# Patient Record
Sex: FEMALE | Race: WHITE | NOT HISPANIC OR LATINO | Employment: OTHER | URBAN - METROPOLITAN AREA
[De-identification: names, ages, dates, MRNs, and addresses within clinical notes are randomized per-mention and may not be internally consistent; named-entity substitution may affect disease eponyms.]

---

## 2018-12-17 ENCOUNTER — OFFICE VISIT - HEALTHEAST (OUTPATIENT)
Dept: FAMILY MEDICINE | Facility: CLINIC | Age: 28
End: 2018-12-17

## 2018-12-17 DIAGNOSIS — Z80.3 FAMILY HISTORY OF MALIGNANT NEOPLASM OF BREAST: ICD-10-CM

## 2018-12-17 DIAGNOSIS — B37.31 YEAST INFECTION OF THE VAGINA: ICD-10-CM

## 2018-12-17 DIAGNOSIS — Z12.4 SCREENING FOR CERVICAL CANCER: ICD-10-CM

## 2018-12-17 DIAGNOSIS — N89.8 VAGINAL DISCHARGE: ICD-10-CM

## 2018-12-17 DIAGNOSIS — Z01.419 WELL FEMALE EXAM WITH ROUTINE GYNECOLOGICAL EXAM: ICD-10-CM

## 2018-12-17 LAB
CLUE CELLS: ABNORMAL
TRICHOMONAS, WET PREP: ABNORMAL
YEAST, WET PREP: ABNORMAL

## 2018-12-17 ASSESSMENT — MIFFLIN-ST. JEOR: SCORE: 1238.65

## 2018-12-19 ENCOUNTER — COMMUNICATION - HEALTHEAST (OUTPATIENT)
Dept: FAMILY MEDICINE | Facility: CLINIC | Age: 28
End: 2018-12-19

## 2018-12-19 LAB
BKR LAB AP ABNORMAL BLEEDING: NO
BKR LAB AP BIRTH CONTROL/HORMONES: NORMAL
BKR LAB AP CERVICAL APPEARANCE: NORMAL
BKR LAB AP GYN ADEQUACY: NORMAL
BKR LAB AP GYN INTERPRETATION: NORMAL
BKR LAB AP HPV REFLEX: NORMAL
BKR LAB AP LMP: NORMAL
BKR LAB AP PATIENT STATUS: NORMAL
BKR LAB AP PREVIOUS ABNORMAL: NO
BKR LAB AP PREVIOUS NORMAL: 2014
HIGH RISK?: NO
PATH REPORT.COMMENTS IMP SPEC: NORMAL
RESULT FLAG (HE HISTORICAL CONVERSION): NORMAL

## 2018-12-20 ENCOUNTER — COMMUNICATION - HEALTHEAST (OUTPATIENT)
Dept: FAMILY MEDICINE | Facility: CLINIC | Age: 28
End: 2018-12-20

## 2018-12-20 DIAGNOSIS — Z80.3 FAMILY HISTORY OF BREAST CANCER IN FIRST DEGREE RELATIVE: ICD-10-CM

## 2018-12-20 DIAGNOSIS — Z80.3 FAMILY HISTORY OF MALIGNANT NEOPLASM OF BREAST: ICD-10-CM

## 2019-01-14 ENCOUNTER — TELEPHONE (OUTPATIENT)
Dept: ONCOLOGY | Facility: CLINIC | Age: 29
End: 2019-01-14

## 2019-01-14 NOTE — TELEPHONE ENCOUNTER
ONCOLOGY INTAKE: Records Information      APPT INFORMATION:  Referring provider:  Sary Rivas  Referring provider s clinic:  [facility]  Reason for visit/diagnosis:  Family Hx Breast Cancer    Were the records received with the referral (via [employer])? No    Has patient been seen for any external appt for this diagnosis (enter clinic/location)? Please confirm with PT upon Scheduling

## 2019-07-19 ENCOUNTER — OFFICE VISIT - HEALTHEAST (OUTPATIENT)
Dept: FAMILY MEDICINE | Facility: CLINIC | Age: 29
End: 2019-07-19

## 2019-07-19 DIAGNOSIS — Z31.69 PRE-CONCEPTION COUNSELING: ICD-10-CM

## 2019-07-19 DIAGNOSIS — N94.6 DYSMENORRHEA: ICD-10-CM

## 2019-07-19 ASSESSMENT — MIFFLIN-ST. JEOR: SCORE: 1258.38

## 2020-01-06 ENCOUNTER — OFFICE VISIT - HEALTHEAST (OUTPATIENT)
Dept: FAMILY MEDICINE | Facility: CLINIC | Age: 30
End: 2020-01-06

## 2020-01-06 DIAGNOSIS — N91.2 AMENORRHEA: ICD-10-CM

## 2020-01-06 DIAGNOSIS — N92.6 MISSED PERIOD: ICD-10-CM

## 2020-01-06 DIAGNOSIS — Z3A.01 LESS THAN 8 WEEKS GESTATION OF PREGNANCY: ICD-10-CM

## 2020-01-06 LAB — HCG UR QL: POSITIVE

## 2020-01-06 ASSESSMENT — MIFFLIN-ST. JEOR: SCORE: 1263.83

## 2020-01-13 ENCOUNTER — HOSPITAL ENCOUNTER (OUTPATIENT)
Dept: ULTRASOUND IMAGING | Facility: HOSPITAL | Age: 30
Discharge: HOME | End: 2020-01-13
Attending: NURSE PRACTITIONER

## 2020-02-10 ENCOUNTER — PRENATAL OFFICE VISIT - HEALTHEAST (OUTPATIENT)
Dept: MIDWIFE SERVICES | Facility: CLINIC | Age: 30
End: 2020-02-10

## 2020-02-10 DIAGNOSIS — Z34.01 ENCOUNTER FOR SUPERVISION OF NORMAL FIRST PREGNANCY IN FIRST TRIMESTER: ICD-10-CM

## 2020-02-10 LAB
BASOPHILS # BLD AUTO: 0 THOU/UL (ref 0–0.2)
BASOPHILS NFR BLD AUTO: 0 % (ref 0–2)
EOSINOPHIL # BLD AUTO: 0.3 THOU/UL (ref 0–0.4)
EOSINOPHIL NFR BLD AUTO: 3 % (ref 0–6)
ERYTHROCYTE [DISTWIDTH] IN BLOOD BY AUTOMATED COUNT: 11.9 % (ref 11–14.5)
HCT VFR BLD AUTO: 43.4 % (ref 35–47)
HGB BLD-MCNC: 14.4 G/DL (ref 12–16)
HIV 1+2 AB+HIV1 P24 AG SERPL QL IA: NEGATIVE
LYMPHOCYTES # BLD AUTO: 2.3 THOU/UL (ref 0.8–4.4)
LYMPHOCYTES NFR BLD AUTO: 24 % (ref 20–40)
MCH RBC QN AUTO: 31.2 PG (ref 27–34)
MCHC RBC AUTO-ENTMCNC: 33.2 G/DL (ref 32–36)
MCV RBC AUTO: 94 FL (ref 80–100)
MONOCYTES # BLD AUTO: 0.4 THOU/UL (ref 0–0.9)
MONOCYTES NFR BLD AUTO: 4 % (ref 2–10)
NEUTROPHILS # BLD AUTO: 6.5 THOU/UL (ref 2–7.7)
NEUTROPHILS NFR BLD AUTO: 68 % (ref 50–70)
PLATELET # BLD AUTO: 296 THOU/UL (ref 140–440)
PMV BLD AUTO: 10.7 FL (ref 8.5–12.5)
RBC # BLD AUTO: 4.61 MILL/UL (ref 3.8–5.4)
WBC: 9.5 THOU/UL (ref 4–11)

## 2020-02-10 ASSESSMENT — MIFFLIN-ST. JEOR: SCORE: 1274.94

## 2020-02-10 ASSESSMENT — EDINBURGH POSTNATAL DEPRESSION SCALE (EPDS): TOTAL SCORE: 0

## 2020-02-11 LAB
ABO/RH(D): NORMAL
ABORH REPEAT: NORMAL
ANTIBODY SCREEN: NEGATIVE
BACTERIA SPEC CULT: NO GROWTH
HBV SURFACE AG SERPL QL IA: NEGATIVE
RUBV IGG SERPL QL IA: POSITIVE
T PALLIDUM AB SER QL: NEGATIVE

## 2020-02-20 ENCOUNTER — COMMUNICATION - HEALTHEAST (OUTPATIENT)
Dept: MIDWIFE SERVICES | Facility: CLINIC | Age: 30
End: 2020-02-20

## 2020-02-24 ENCOUNTER — PRENATAL OFFICE VISIT - HEALTHEAST (OUTPATIENT)
Dept: MIDWIFE SERVICES | Facility: CLINIC | Age: 30
End: 2020-02-24

## 2020-02-24 DIAGNOSIS — Z34.01 ENCOUNTER FOR SUPERVISION OF NORMAL FIRST PREGNANCY IN FIRST TRIMESTER: ICD-10-CM

## 2020-02-24 ASSESSMENT — MIFFLIN-ST. JEOR: SCORE: 1271.53

## 2020-03-16 ENCOUNTER — PRENATAL OFFICE VISIT - HEALTHEAST (OUTPATIENT)
Dept: MIDWIFE SERVICES | Facility: CLINIC | Age: 30
End: 2020-03-16

## 2020-03-16 DIAGNOSIS — L30.9 ACUTE ECZEMA: ICD-10-CM

## 2020-03-16 DIAGNOSIS — Z34.01 ENCOUNTER FOR SUPERVISION OF NORMAL FIRST PREGNANCY IN FIRST TRIMESTER: ICD-10-CM

## 2020-03-16 ASSESSMENT — MIFFLIN-ST. JEOR: SCORE: 1295.35

## 2020-04-06 ENCOUNTER — HOSPITAL ENCOUNTER (OUTPATIENT)
Dept: ULTRASOUND IMAGING | Facility: HOSPITAL | Age: 30
Discharge: HOME | End: 2020-04-06
Attending: ADVANCED PRACTICE MIDWIFE

## 2020-04-06 ENCOUNTER — AMBULATORY - HEALTHEAST (OUTPATIENT)
Dept: OBGYN | Facility: CLINIC | Age: 30
End: 2020-04-06

## 2020-04-06 DIAGNOSIS — Z34.01 ENCOUNTER FOR SUPERVISION OF NORMAL FIRST PREGNANCY IN FIRST TRIMESTER: ICD-10-CM

## 2020-04-13 ENCOUNTER — PRENATAL OFFICE VISIT - HEALTHEAST (OUTPATIENT)
Dept: MIDWIFE SERVICES | Facility: CLINIC | Age: 30
End: 2020-04-13

## 2020-04-13 DIAGNOSIS — Z34.01 ENCOUNTER FOR SUPERVISION OF NORMAL FIRST PREGNANCY IN FIRST TRIMESTER: ICD-10-CM

## 2020-04-13 DIAGNOSIS — L30.9 ACUTE ECZEMA: ICD-10-CM

## 2020-05-11 ENCOUNTER — OFFICE VISIT - HEALTHEAST (OUTPATIENT)
Dept: MIDWIFE SERVICES | Facility: CLINIC | Age: 30
End: 2020-05-11

## 2020-05-11 DIAGNOSIS — Z34.01 ENCOUNTER FOR SUPERVISION OF NORMAL FIRST PREGNANCY IN FIRST TRIMESTER: ICD-10-CM

## 2020-05-11 DIAGNOSIS — Z34.02 ENCOUNTER FOR SUPERVISION OF NORMAL FIRST PREGNANCY, SECOND TRIMESTER: ICD-10-CM

## 2020-05-25 ENCOUNTER — COMMUNICATION - HEALTHEAST (OUTPATIENT)
Dept: MIDWIFE SERVICES | Facility: CLINIC | Age: 30
End: 2020-05-25

## 2020-06-01 ENCOUNTER — PRENATAL OFFICE VISIT - HEALTHEAST (OUTPATIENT)
Dept: MIDWIFE SERVICES | Facility: CLINIC | Age: 30
End: 2020-06-01

## 2020-06-01 DIAGNOSIS — Z34.01 ENCOUNTER FOR SUPERVISION OF NORMAL FIRST PREGNANCY IN FIRST TRIMESTER: ICD-10-CM

## 2020-06-01 LAB
FASTING STATUS PATIENT QL REPORTED: NO
GLUCOSE 1H P 50 G GLC PO SERPL-MCNC: 91 MG/DL (ref 70–139)
HGB BLD-MCNC: 11.5 G/DL (ref 12–16)

## 2020-06-01 ASSESSMENT — MIFFLIN-ST. JEOR: SCORE: 1354.77

## 2020-06-02 LAB — T PALLIDUM AB SER QL: NEGATIVE

## 2020-06-15 ENCOUNTER — OFFICE VISIT - HEALTHEAST (OUTPATIENT)
Dept: MIDWIFE SERVICES | Facility: CLINIC | Age: 30
End: 2020-06-15

## 2020-06-15 DIAGNOSIS — Z34.01 ENCOUNTER FOR SUPERVISION OF NORMAL FIRST PREGNANCY IN FIRST TRIMESTER: ICD-10-CM

## 2020-06-29 ENCOUNTER — PRENATAL OFFICE VISIT - HEALTHEAST (OUTPATIENT)
Dept: MIDWIFE SERVICES | Facility: CLINIC | Age: 30
End: 2020-06-29

## 2020-06-29 DIAGNOSIS — Z34.01 ENCOUNTER FOR SUPERVISION OF NORMAL FIRST PREGNANCY IN FIRST TRIMESTER: ICD-10-CM

## 2020-06-29 ASSESSMENT — MIFFLIN-ST. JEOR: SCORE: 1360.67

## 2020-07-13 ENCOUNTER — OFFICE VISIT - HEALTHEAST (OUTPATIENT)
Dept: MIDWIFE SERVICES | Facility: CLINIC | Age: 30
End: 2020-07-13

## 2020-07-13 DIAGNOSIS — Z34.03 ENCOUNTER FOR SUPERVISION OF NORMAL FIRST PREGNANCY IN THIRD TRIMESTER: ICD-10-CM

## 2020-07-13 DIAGNOSIS — L30.9 ACUTE ECZEMA: ICD-10-CM

## 2020-07-13 ASSESSMENT — EDINBURGH POSTNATAL DEPRESSION SCALE (EPDS): TOTAL SCORE: 2

## 2020-07-27 ENCOUNTER — PRENATAL OFFICE VISIT - HEALTHEAST (OUTPATIENT)
Dept: MIDWIFE SERVICES | Facility: CLINIC | Age: 30
End: 2020-07-27

## 2020-07-27 DIAGNOSIS — Z34.03 ENCOUNTER FOR SUPERVISION OF NORMAL FIRST PREGNANCY IN THIRD TRIMESTER: ICD-10-CM

## 2020-07-27 ASSESSMENT — MIFFLIN-ST. JEOR: SCORE: 1385.62

## 2020-08-03 ENCOUNTER — PRENATAL OFFICE VISIT - HEALTHEAST (OUTPATIENT)
Dept: MIDWIFE SERVICES | Facility: CLINIC | Age: 30
End: 2020-08-03

## 2020-08-03 DIAGNOSIS — Z34.03 ENCOUNTER FOR SUPERVISION OF NORMAL FIRST PREGNANCY IN THIRD TRIMESTER: ICD-10-CM

## 2020-08-03 LAB — HGB BLD-MCNC: 12.8 G/DL (ref 12–16)

## 2020-08-03 ASSESSMENT — MIFFLIN-ST. JEOR: SCORE: 1383.8

## 2020-08-04 LAB
ALLERGIC TO PENICILLIN: NO
GP B STREP DNA SPEC QL NAA+PROBE: POSITIVE

## 2020-08-10 ENCOUNTER — PRENATAL OFFICE VISIT - HEALTHEAST (OUTPATIENT)
Dept: MIDWIFE SERVICES | Facility: CLINIC | Age: 30
End: 2020-08-10

## 2020-08-10 DIAGNOSIS — Z34.03 ENCOUNTER FOR SUPERVISION OF NORMAL FIRST PREGNANCY IN THIRD TRIMESTER: ICD-10-CM

## 2020-08-10 DIAGNOSIS — B95.1 POSITIVE GBS TEST: ICD-10-CM

## 2020-08-10 ASSESSMENT — MIFFLIN-ST. JEOR: SCORE: 1384.71

## 2020-08-19 ENCOUNTER — PRENATAL OFFICE VISIT - HEALTHEAST (OUTPATIENT)
Dept: MIDWIFE SERVICES | Facility: CLINIC | Age: 30
End: 2020-08-19

## 2020-08-19 ENCOUNTER — COMMUNICATION - HEALTHEAST (OUTPATIENT)
Dept: MIDWIFE SERVICES | Facility: CLINIC | Age: 30
End: 2020-08-19

## 2020-08-19 DIAGNOSIS — I49.8 OTHER CARDIAC ARRHYTHMIA: ICD-10-CM

## 2020-08-19 DIAGNOSIS — Z34.03 ENCOUNTER FOR SUPERVISION OF NORMAL FIRST PREGNANCY IN THIRD TRIMESTER: ICD-10-CM

## 2020-08-19 DIAGNOSIS — B95.1 POSITIVE GBS TEST: ICD-10-CM

## 2020-08-19 ASSESSMENT — MIFFLIN-ST. JEOR: SCORE: 1396.5

## 2020-08-20 ENCOUNTER — HOSPITAL ENCOUNTER (OUTPATIENT)
Dept: CARDIOLOGY | Facility: CLINIC | Age: 30
Discharge: HOME | End: 2020-08-20
Attending: ADVANCED PRACTICE MIDWIFE

## 2020-08-20 DIAGNOSIS — I49.8 OTHER CARDIAC ARRHYTHMIA: ICD-10-CM

## 2020-08-20 LAB
ATRIAL RATE - MUSE: 69 BPM
DIASTOLIC BLOOD PRESSURE - MUSE: NORMAL
INTERPRETATION ECG - MUSE: NORMAL
P AXIS - MUSE: 53 DEGREES
PR INTERVAL - MUSE: 108 MS
QRS DURATION - MUSE: 72 MS
QT - MUSE: 370 MS
QTC - MUSE: 396 MS
R AXIS - MUSE: 87 DEGREES
SYSTOLIC BLOOD PRESSURE - MUSE: NORMAL
T AXIS - MUSE: 30 DEGREES
VENTRICULAR RATE- MUSE: 69 BPM

## 2020-08-21 ENCOUNTER — COMMUNICATION - HEALTHEAST (OUTPATIENT)
Dept: MIDWIFE SERVICES | Facility: CLINIC | Age: 30
End: 2020-08-21

## 2020-08-21 ENCOUNTER — COMMUNICATION - HEALTHEAST (OUTPATIENT)
Dept: CARDIOLOGY | Facility: CLINIC | Age: 30
End: 2020-08-21

## 2020-08-22 ENCOUNTER — HOSPITAL ENCOUNTER (OUTPATIENT)
Dept: OBGYN | Facility: HOSPITAL | Age: 30
Discharge: HOME | End: 2020-08-22
Attending: ADVANCED PRACTICE MIDWIFE | Admitting: ADVANCED PRACTICE MIDWIFE

## 2020-08-22 ENCOUNTER — COMMUNICATION - HEALTHEAST (OUTPATIENT)
Dept: OBGYN | Facility: CLINIC | Age: 30
End: 2020-08-22

## 2020-08-22 LAB
SARS-COV-2 PCR COMMENT: NORMAL
SARS-COV-2 RNA SPEC QL NAA+PROBE: NEGATIVE
SARS-COV-2 VIRUS SPECIMEN SOURCE: NORMAL

## 2020-08-22 ASSESSMENT — MIFFLIN-ST. JEOR: SCORE: 1396.5

## 2020-08-23 ENCOUNTER — COMMUNICATION - HEALTHEAST (OUTPATIENT)
Dept: SCHEDULING | Facility: CLINIC | Age: 30
End: 2020-08-23

## 2020-08-23 ENCOUNTER — ANESTHESIA - HEALTHEAST (OUTPATIENT)
Dept: OBGYN | Facility: HOSPITAL | Age: 30
End: 2020-08-23

## 2020-08-25 ENCOUNTER — HOME CARE/HOSPICE - HEALTHEAST (OUTPATIENT)
Dept: HOME HEALTH SERVICES | Facility: HOME HEALTH | Age: 30
End: 2020-08-25

## 2020-08-27 ENCOUNTER — HOME CARE/HOSPICE - HEALTHEAST (OUTPATIENT)
Dept: HOME HEALTH SERVICES | Facility: HOME HEALTH | Age: 30
End: 2020-08-27

## 2020-09-08 ENCOUNTER — OFFICE VISIT - HEALTHEAST (OUTPATIENT)
Dept: MIDWIFE SERVICES | Facility: CLINIC | Age: 30
End: 2020-09-08

## 2020-09-08 ASSESSMENT — EDINBURGH POSTNATAL DEPRESSION SCALE (EPDS): TOTAL SCORE: 1

## 2021-01-20 ENCOUNTER — IMMUNIZATION (OUTPATIENT)
Dept: NURSING | Facility: CLINIC | Age: 31
End: 2021-01-20
Payer: COMMERCIAL

## 2021-01-20 PROCEDURE — 0001A PR COVID VAC PFIZER DIL RECON 30 MCG/0.3 ML IM: CPT

## 2021-01-20 PROCEDURE — 91300 PR COVID VAC PFIZER DIL RECON 30 MCG/0.3 ML IM: CPT

## 2021-02-10 ENCOUNTER — IMMUNIZATION (OUTPATIENT)
Dept: NURSING | Facility: CLINIC | Age: 31
End: 2021-02-10
Attending: FAMILY MEDICINE
Payer: COMMERCIAL

## 2021-02-10 PROCEDURE — 0002A PR COVID VAC PFIZER DIL RECON 30 MCG/0.3 ML IM: CPT

## 2021-02-10 PROCEDURE — 91300 PR COVID VAC PFIZER DIL RECON 30 MCG/0.3 ML IM: CPT

## 2021-03-07 ENCOUNTER — HEALTH MAINTENANCE LETTER (OUTPATIENT)
Age: 31
End: 2021-03-07

## 2021-05-17 ENCOUNTER — OFFICE VISIT (OUTPATIENT)
Dept: FAMILY MEDICINE | Facility: CLINIC | Age: 31
End: 2021-05-17
Payer: COMMERCIAL

## 2021-05-17 VITALS
HEIGHT: 64 IN | RESPIRATION RATE: 16 BRPM | WEIGHT: 128 LBS | OXYGEN SATURATION: 97 % | DIASTOLIC BLOOD PRESSURE: 60 MMHG | TEMPERATURE: 97.7 F | BODY MASS INDEX: 21.85 KG/M2 | SYSTOLIC BLOOD PRESSURE: 128 MMHG | HEART RATE: 67 BPM

## 2021-05-17 DIAGNOSIS — Z12.4 SCREENING FOR MALIGNANT NEOPLASM OF CERVIX: ICD-10-CM

## 2021-05-17 DIAGNOSIS — Z00.00 ROUTINE GENERAL MEDICAL EXAMINATION AT A HEALTH CARE FACILITY: Primary | ICD-10-CM

## 2021-05-17 DIAGNOSIS — Z80.3 FAMILY HISTORY OF MALIGNANT NEOPLASM OF BREAST: ICD-10-CM

## 2021-05-17 DIAGNOSIS — H61.23 BILATERAL IMPACTED CERUMEN: ICD-10-CM

## 2021-05-17 DIAGNOSIS — F34.1 DYSTHYMIA: ICD-10-CM

## 2021-05-17 PROBLEM — I49.9 ARRHYTHMIA: Status: ACTIVE | Noted: 2020-08-19

## 2021-05-17 PROBLEM — L30.9 ACUTE ECZEMA: Status: ACTIVE | Noted: 2020-03-16

## 2021-05-17 PROBLEM — Z30.49 ENCOUNTER FOR SURVEILLANCE OF OTHER CONTRACEPTIVE: Status: ACTIVE | Noted: 2021-05-17

## 2021-05-17 PROCEDURE — 99385 PREV VISIT NEW AGE 18-39: CPT | Performed by: NURSE PRACTITIONER

## 2021-05-17 PROCEDURE — G0145 SCR C/V CYTO,THINLAYER,RESCR: HCPCS | Performed by: NURSE PRACTITIONER

## 2021-05-17 PROCEDURE — 87624 HPV HI-RISK TYP POOLED RSLT: CPT | Performed by: NURSE PRACTITIONER

## 2021-05-17 ASSESSMENT — ANXIETY QUESTIONNAIRES
2. NOT BEING ABLE TO STOP OR CONTROL WORRYING: NOT AT ALL
3. WORRYING TOO MUCH ABOUT DIFFERENT THINGS: NOT AT ALL
6. BECOMING EASILY ANNOYED OR IRRITABLE: NOT AT ALL
IF YOU CHECKED OFF ANY PROBLEMS ON THIS QUESTIONNAIRE, HOW DIFFICULT HAVE THESE PROBLEMS MADE IT FOR YOU TO DO YOUR WORK, TAKE CARE OF THINGS AT HOME, OR GET ALONG WITH OTHER PEOPLE: NOT DIFFICULT AT ALL
1. FEELING NERVOUS, ANXIOUS, OR ON EDGE: NOT AT ALL
7. FEELING AFRAID AS IF SOMETHING AWFUL MIGHT HAPPEN: NOT AT ALL
5. BEING SO RESTLESS THAT IT IS HARD TO SIT STILL: NOT AT ALL
GAD7 TOTAL SCORE: 1

## 2021-05-17 ASSESSMENT — PATIENT HEALTH QUESTIONNAIRE - PHQ9
SUM OF ALL RESPONSES TO PHQ QUESTIONS 1-9: 3
5. POOR APPETITE OR OVEREATING: SEVERAL DAYS

## 2021-05-17 ASSESSMENT — MIFFLIN-ST. JEOR: SCORE: 1280.6

## 2021-05-17 NOTE — PROGRESS NOTES
"   SUBJECTIVE:   CC: Shelly Salmeron is an 30 year old woman who presents for preventive health visit.       Patient has been advised of split billing requirements and indicates understanding: Yes  Healthy Habits:    Do you get at least three servings of calcium containing foods daily (dairy, green leafy vegetables, etc.)? no    Amount of exercise or daily activities, outside of work: 3 day(s) per week    Problems taking medications regularly No    Medication side effects: No    Have you had an eye exam in the past two years? no    Do you see a dentist twice per year? yes    Do you have sleep apnea, excessive snoring or daytime drowsiness?no      Abnormal Mood Symptoms  Onset: about 3 mo     Description:   Depression: YES  Anxiety: no    Accompanying Signs & Symptoms:  Still participating in activities that you used to enjoy: no doesn't feel like being around others  Fatigue: YES  Irritability: no  Difficulty concentrating: no  Changes in appetite: no  Problems with sleep: no  Heart racing/beating fast : no  Thoughts of hurting yourself or others: none    History:   Recent stress: YES- having a baby, moving  Prior depression hospitalization: None  Family history of depression: YES  Family history of anxiety: YES    Precipitating factors:   Alcohol/drug use: no    Alleviating factors:  none    Therapies Tried and outcome: None    Patient discussed feeling not wanting to participate in her normal activities or have social contact. Normally she states this feeling is intermittent, however, this time the feeling is lasting longer than normal. Symptoms still seem to come and go \"with more frequency\". She has family history of depression. She would like to try counseling sessions to see if this will improve any of her symptoms. She does not feel like she needs medications as this time.     Today's PHQ-2 Score:   PHQ-2 ( 1999 [employer]) 5/17/2021   Q1: Little interest or pleasure in doing things 1   Q2: Feeling down, " depressed or hopeless 1   PHQ-2 Score 2       Abuse: Current or Past(Physical, Sexual or Emotional)- No  Do you feel safe in your environment? Yes    Have you ever done Advance Care Planning? (For example, a Health Directive, POLST, or a discussion with a medical provider or your loved ones about your wishes): No, advance care planning information given to patient to review.  Advanced care planning was discussed at today's visit.    Social History     Tobacco Use     Smoking status: Never Smoker     Smokeless tobacco: Never Used   Substance Use Topics     Alcohol use: Yes     Comment: occ.     If you drink alcohol do you typically have >3 drinks per day or >7 drinks per week? No                     Reviewed orders with patient.  Reviewed health maintenance and updated orders accordingly - Yes    Patient has a strong family history of early breast cancer, mother in her early 40's  History of abnormal Pap smear: No     Reviewed and updated as needed this visit by clinical staff  Tobacco  Allergies  Meds  Problems  Med Hx  Surg Hx  Fam Hx  Soc Hx          Reviewed and updated as needed this visit by Provider  Tobacco  Allergies  Meds  Problems  Med Hx  Surg Hx  Fam Hx             ROS:  CONSTITUTIONAL: NEGATIVE for fever, chills, change in weight  INTEGUMENTARU/SKIN: NEGATIVE for worrisome rashes, moles or lesions  EYES: NEGATIVE for vision changes or irritation  ENT: NEGATIVE for ear, mouth and throat problems in general. Left ear feels plugged.   RESP: NEGATIVE for significant cough or SOB  BREAST: NEGATIVE for masses, tenderness or discharge  CV: NEGATIVE for chest pain, palpitations or peripheral edema  GI: NEGATIVE for nausea, abdominal pain, heartburn, or change in bowel habits  : NEGATIVE for unusual urinary or vaginal symptoms. Periods are regular.  MUSCULOSKELETAL: NEGATIVE for significant arthralgias or myalgia  NEURO: NEGATIVE for weakness, dizziness or paresthesias  PSYCHIATRIC: Positive for  "concerns of lower mood/ depression.     OBJECTIVE:   /60 (BP Location: Right arm, Patient Position: Chair, Cuff Size: Adult Regular)   Pulse 67   Temp 97.7  F (36.5  C)   Resp 16   Ht 1.626 m (5' 4\")   Wt 58.1 kg (128 lb)   LMP 05/04/2021 (Exact Date)   SpO2 97%   Breastfeeding Yes   BMI 21.97 kg/m       EXAM:  GENERAL: healthy, alert and no distress  EYES: Eyes grossly normal to inspection, PERRL and conjunctivae and sclerae normal  HENT: bilateral ear canals and TM's impacted with cerumen, nose and mouth without ulcers or lesions  NECK: no adenopathy, no asymmetry, masses, or scars and thyroid normal to palpation  RESP: lungs clear to auscultation - no rales, rhonchi or wheezes  BREAST: normal without masses, tenderness or nipple discharge and no palpable axillary masses or adenopathy  CV: regular rate and rhythm, normal S1 S2, no S3 or S4, no murmur, click or rub, no peripheral edema and peripheral pulses strong  ABDOMEN: soft, nontender, no hepatosplenomegaly, no masses and bowel sounds normal   (female): normal female external genitalia, normal urethral meatus, vaginal mucosa pink, moist, well rugated, and normal cervix/adnexa/uterus without masses or discharge  MS: no gross musculoskeletal defects noted, no edema  SKIN: no suspicious lesions or rashes  NEURO: Normal strength and tone, mentation intact and speech normal  PSYCH: mentation appears normal, affect normal/bright      ASSESSMENT/PLAN:     Routine general medical examination at a health care facility   Healthy female exam completed today. Discussed lifestyle modifications including weight loss/ management, eating a balanced diet, and getting regular cardiovascular exercise. Discussed self breast exams and how to complete these. Pap Smear updated today. Labs reviewed. Plan yearly annual physicals or sooner as needed.    Family history of malignant neoplasm of breast  Family history of early breast cancer, mother diagnosed in early " "40's. Will order screening mammogram.  -MA Screening Digital Bilateral    Screening for malignant neoplasm of cervix   Pap smear completed today, if negative will not need to repeat for 5 years   -Pap imaged thin layer screen with HPV -   recommended age 30 - 65 years, HPV High Risk Types DNA Cervical    Dysthymia  Patient will follow up with employer to determine if there is a program through work, if not will put in referral for therapy consultation. She does not need to follow up with this unless worsening.     Bilateral impacted cerumen  Bilateral ears cleaned with irrigation by Medical Assistant. Patient tolerated the procedure well and is without complaint.     Advised patient to avoid Q-Tips, topical emollients such asmineral oil may be beneficial in decreasing the incidence of cerumen impaction. A cotton ball dipped in Mineral oil and placed in the external ear canal for 10-20 min once a week, may help aid in normal elimination ofcerumen.         Patient has been advised of split billing requirements and indicates understanding: Yes  COUNSELING:   Reviewed preventive health counseling, as reflected in patient instructions       Regular exercise       Healthy diet/nutrition       Folic Acid       Osteoporosis prevention/bone health    Estimated body mass index is 21.97 kg/m  as calculated from the following:    Height as of this encounter: 1.626 m (5' 4\").    Weight as of this encounter: 58.1 kg (128 lb).        She reports that she has never smoked. She has never used smokeless tobacco.      Counseling Resources:  ATP IV Guidelines  Pooled Cohorts Equation Calculator  Breast Cancer Risk Calculator  BRCA-Related Cancer Risk Assessment: FHS-7 Tool  FRAX Risk Assessment  ICSI Preventive Guidelines  Dietary Guidelines for Americans, 2010  USDA's MyPlate  ASA Prophylaxis  Lung CA Screening    YULIET Witt [facility]  "

## 2021-05-18 ASSESSMENT — ANXIETY QUESTIONNAIRES: GAD7 TOTAL SCORE: 1

## 2021-05-19 LAB
COPATH REPORT: NORMAL
PAP: NORMAL

## 2021-05-21 LAB
FINAL DIAGNOSIS: NORMAL
HPV HR 12 DNA CVX QL NAA+PROBE: NEGATIVE
HPV16 DNA SPEC QL NAA+PROBE: NEGATIVE
HPV18 DNA SPEC QL NAA+PROBE: NEGATIVE
SPECIMEN DESCRIPTION: NORMAL
SPECIMEN SOURCE CVX/VAG CYTO: NORMAL

## 2021-05-27 VITALS
RESPIRATION RATE: 18 BRPM | TEMPERATURE: 98 F | SYSTOLIC BLOOD PRESSURE: 90 MMHG | HEART RATE: 80 BPM | DIASTOLIC BLOOD PRESSURE: 60 MMHG

## 2021-05-30 NOTE — PROGRESS NOTES
Assessment/ Plan:         1. Dysmenorrhea     2. Pre-conception counseling       History of dysmenorrhea reassured patient that the symptoms that she experiences in the beginning of her cycle is reportedly within normal limits.  However if she does have difficulties conceiving in the next 1 year this would certainly be something that could be worked up further.  At this time it does not sound as though she has endometriosis.    Preconception counseling discussed with the patient including starting a prenatal vitamin and vitamin D, obtaining and maintaining regular cardiovascular exercise and eating a balanced and healthy diet.  Also discussed different types of pregnancy care that is available when she gets to be pregnant.  Briefly discussed the care and pregnancy.  Advised patient to think about what type of care she would like to have during pregnancy and where she would like to deliver her baby.  I answered all questions the best my ability.  Patient advised to follow-up as needed.      Subjective:      Shelly Salmeron is a 28 y.o. female who presents for pre-conception counseling. She does mention some severe back, hip, and abdominal pain and bloating on the first day of her mensis. She reports that this will occur for the first couple days of her cycle but then will resolve. No family history of endometriosis or infertility. Her mensis is regular and average length is 29 days.  She denies any significant heavy bleeding or clot passage.  She currently uses condoms for contraceptive management.  She has not started any supplements such as a prenatal vitamin.    The following portions of the patient's history were reviewed and updated as appropriate: allergies, current medications and problem list.    There is no problem list on file for this patient.      No current outpatient medications on file.       Review of Systems   Pertinent items are noted in HPI.      Objective:      /70   Pulse 72   Resp 16   " Ht 5' 2.6\" (1.59 m)   Wt 128 lb (58.1 kg)   LMP 07/05/2019   BMI 22.96 kg/m      No physical exam today was completed given the nature of the visit.    General appearance: alert, appears stated age and cooperative      Sary Rivas, BRAN  5:13 PM  "

## 2021-06-02 VITALS — HEIGHT: 63 IN | BODY MASS INDEX: 21.97 KG/M2 | WEIGHT: 124 LBS

## 2021-06-03 VITALS — WEIGHT: 128 LBS | BODY MASS INDEX: 22.68 KG/M2 | HEIGHT: 63 IN

## 2021-06-04 ENCOUNTER — VIRTUAL VISIT (OUTPATIENT)
Dept: FAMILY MEDICINE | Facility: CLINIC | Age: 31
End: 2021-06-04
Payer: COMMERCIAL

## 2021-06-04 VITALS
BODY MASS INDEX: 26.74 KG/M2 | SYSTOLIC BLOOD PRESSURE: 98 MMHG | HEIGHT: 63 IN | WEIGHT: 150.9 LBS | HEART RATE: 88 BPM | DIASTOLIC BLOOD PRESSURE: 58 MMHG

## 2021-06-04 VITALS
BODY MASS INDEX: 26.51 KG/M2 | WEIGHT: 149.6 LBS | HEART RATE: 72 BPM | HEIGHT: 63 IN | DIASTOLIC BLOOD PRESSURE: 60 MMHG | SYSTOLIC BLOOD PRESSURE: 90 MMHG

## 2021-06-04 VITALS
WEIGHT: 129.2 LBS | RESPIRATION RATE: 12 BRPM | SYSTOLIC BLOOD PRESSURE: 96 MMHG | DIASTOLIC BLOOD PRESSURE: 62 MMHG | HEART RATE: 64 BPM | HEIGHT: 63 IN | BODY MASS INDEX: 22.89 KG/M2

## 2021-06-04 VITALS
SYSTOLIC BLOOD PRESSURE: 92 MMHG | HEART RATE: 63 BPM | WEIGHT: 142 LBS | OXYGEN SATURATION: 98 % | BODY MASS INDEX: 25.56 KG/M2 | DIASTOLIC BLOOD PRESSURE: 58 MMHG

## 2021-06-04 VITALS — BODY MASS INDEX: 28.14 KG/M2 | HEIGHT: 63 IN | WEIGHT: 158.8 LBS

## 2021-06-04 VITALS
HEART RATE: 80 BPM | HEIGHT: 63 IN | SYSTOLIC BLOOD PRESSURE: 88 MMHG | BODY MASS INDEX: 27.71 KG/M2 | DIASTOLIC BLOOD PRESSURE: 60 MMHG | WEIGHT: 156.4 LBS

## 2021-06-04 VITALS
HEIGHT: 62 IN | DIASTOLIC BLOOD PRESSURE: 50 MMHG | SYSTOLIC BLOOD PRESSURE: 96 MMHG | HEART RATE: 68 BPM | BODY MASS INDEX: 24.48 KG/M2 | WEIGHT: 133 LBS

## 2021-06-04 VITALS
HEIGHT: 63 IN | HEART RATE: 72 BPM | SYSTOLIC BLOOD PRESSURE: 108 MMHG | BODY MASS INDEX: 23.39 KG/M2 | WEIGHT: 132 LBS | DIASTOLIC BLOOD PRESSURE: 66 MMHG

## 2021-06-04 VITALS
HEART RATE: 76 BPM | DIASTOLIC BLOOD PRESSURE: 52 MMHG | BODY MASS INDEX: 24.19 KG/M2 | HEIGHT: 63 IN | WEIGHT: 136.5 LBS | SYSTOLIC BLOOD PRESSURE: 90 MMHG

## 2021-06-04 VITALS
SYSTOLIC BLOOD PRESSURE: 91 MMHG | HEIGHT: 63 IN | DIASTOLIC BLOOD PRESSURE: 44 MMHG | WEIGHT: 158.8 LBS | HEART RATE: 76 BPM | BODY MASS INDEX: 28.14 KG/M2

## 2021-06-04 VITALS
DIASTOLIC BLOOD PRESSURE: 66 MMHG | BODY MASS INDEX: 27.64 KG/M2 | HEIGHT: 63 IN | SYSTOLIC BLOOD PRESSURE: 104 MMHG | WEIGHT: 156 LBS | HEART RATE: 80 BPM

## 2021-06-04 VITALS
SYSTOLIC BLOOD PRESSURE: 86 MMHG | HEIGHT: 63 IN | HEART RATE: 80 BPM | BODY MASS INDEX: 27.68 KG/M2 | DIASTOLIC BLOOD PRESSURE: 60 MMHG | WEIGHT: 156.2 LBS

## 2021-06-04 VITALS — BODY MASS INDEX: 25.56 KG/M2 | WEIGHT: 142 LBS

## 2021-06-04 PROCEDURE — 99214 OFFICE O/P EST MOD 30 MIN: CPT | Mod: 95 | Performed by: NURSE PRACTITIONER

## 2021-06-04 PROCEDURE — 96127 BRIEF EMOTIONAL/BEHAV ASSMT: CPT | Mod: 95 | Performed by: NURSE PRACTITIONER

## 2021-06-04 ASSESSMENT — PATIENT HEALTH QUESTIONNAIRE - PHQ9: SUM OF ALL RESPONSES TO PHQ QUESTIONS 1-9: 11

## 2021-06-04 NOTE — PROGRESS NOTES
Shelly is a 30 year old who is being evaluated via a billable video visit.      How would you like to obtain your AVS? MyChart  If the video visit is dropped, the invitation should be resent by: [phone]  Will anyone else be joining your video visit? No      Video Start Time: 4:38 PM    Assessment & Plan     Post partum depression  Increasing postpartum depression.  Will start sertraline 50 mg.  Advised patient to cut tablet in half.  Take half tablet for the first 1 to 2 weeks then increase to full tab.  Would like her to check in in approximately 4 to 6 weeks.  - sertraline (ZOLOFT) 50 MG tablet; Take 1 tablet (50 mg) by mouth daily             Depression Screening Follow Up    PHQ 6/4/2021   PHQ-9 Total Score 11   Q9: Thoughts of better off dead/self-harm past 2 weeks More than half the days     Last PHQ-9 6/4/2021   1.  Little interest or pleasure in doing things 1   2.  Feeling down, depressed, or hopeless 2   3.  Trouble falling or staying asleep, or sleeping too much 1   4.  Feeling tired or having little energy 1   5.  Poor appetite or overeating 0   6.  Feeling bad about yourself 1   7.  Trouble concentrating 2   8.  Moving slowly or restless 1   Q9: Thoughts of better off dead/self-harm past 2 weeks 2   PHQ-9 Total Score 11   Difficulty at work, home, or with people -               Follow Up      Follow Up Actions Taken  Follow up if symptoms are worsening    Discussed the following ways the patient can remain in a safe environment:  Safe at home and contracts for saftey.       Return in about 6 weeks (around 7/16/2021) for ongoing symptoms if not improving.    YULIET Witt CNP  [facility]   Shelly is a 30 year old who presents for the following health issues     HPI     Abnormal Mood Symptoms  She feels like that her lows are lower than her normal baseline. Little things are getting to her more easily. Symptoms are in general affecting her family  and her wanting to be at work. She is interested in trying to start something to help with her symptoms of depression. She denies any chest pain, sob, or difficulty breathing.       PHQ 5/17/2021 6/4/2021   PHQ-9 Total Score 3 11   Q9: Thoughts of better off dead/self-harm past 2 weeks Not at all More than half the days     GERARDO-7 SCORE 5/17/2021   Total Score 1         Review of Systems   Constitutional, HEENT, cardiovascular, pulmonary, gi and gu systems are negative, except as otherwise noted.      Objective       Vitals:  No vitals were obtained today due to virtual visit.    Physical Exam   GENERAL: Healthy, alert and no distress  EYES: Eyes grossly normal to inspection.  No discharge or erythema, or obvious scleral/conjunctival abnormalities.  RESP: No audible wheeze, cough, or visible cyanosis.  No visible retractions or increased work of breathing.    SKIN: Visible skin clear. No significant rash, abnormal pigmentation or lesions.  NEURO: Cranial nerves grossly intact.  Mentation and speech appropriate for age.  PSYCH: Mentation appears normal, affect normal/bright, judgement and insight intact, normal speech and appearance well-groomed.          Video-Visit Details    Type of service:  Video Visit    Video End Time:4:53 PM    Originating Location (pt. Location): Home    Distant Location (provider location):  [facility]     Platform used for Video Visit: [employer]

## 2021-06-04 NOTE — PATIENT INSTRUCTIONS
Patient Education     Postpartum Depression: Treatment  Postpartum depression is a serious mood disorder that affects many women after having a baby. If you have this condition you may feel extremely sad, angry, anxious, and tired. You may cry a lot and have trouble focusing. Left untreated, these feelings can make it hard for you to take care of yourself and your baby. But help is available. Treatment includes medicine and talk therapy.   How is postpartum depression treated?  Treatment options for postpartum depression are generally the same, whether you are breastfeeding your baby or not. The 2 main treatments are:     Medicine. Antidepressants are the main type of medicine used for this condition. They work by balancing chemicals in the brain that control your moods. There are many different antidepressants. They work on different chemicals in the brain. In some cases, more than 1 medicine may be used. Tell your healthcare provider if you are breastfeeding. He or she can help you find the medicine that s best for you and your baby.    Talk therapy (counseling or psychotherapy). During a therapy session, you will talk about your feelings with a mental health provider (a therapist, counselor, psychologist, or ). He or she can provide emotional support, and help you learn ways to manage stress and anxiety. This may be done in private sessions with just you and the provider, or it may be done in a group therapy session.  Medicine and talk therapy may be used alone or together. Talk therapy may be used alone if antidepressants have not worked well for you in the past. Talk with your healthcare provider about this.   Most women find their condition gets better with these treatments. Other things you can do at home that may also help you feel better include:     Talking with friends and family members about how you are feeling    Joining a support group for moms with new babies    Doing some light  exercise, such as taking your baby for a walk in the stroller    Eating a healthy diet    Getting some rest whenever you can, such as sleeping when your baby sleeps    Asking for and accepting help with meals, shopping, and laundry  Medicines for breastfeeding moms  Most women with postpartum depression are treated with antidepressant medicines. These medicines must be prescribed by your healthcare provider. They are generally safe for both you and your baby. Tell your healthcare provider if you are breastfeeding. He or she can help you find a medicine that s a good choice for both you and your baby.   If you were treated with antidepressants during your pregnancy, your provider may want you to keep using the same medicine while breastfeeding. Changing to another medicine can have risks for you and your baby. Talk with your healthcare provider.   The types of antidepressants that may be used to treat postpartum depression in both breastfeeding and nonbreastfeeding moms include:     SSRIs (selective serotonin reuptake inhibitors). These types of medicines are used most often. They are considered safe for both mom and baby.    SNRIs (serotonin-norepinephrine reuptake inhibitors). These medicines also seem to be safe to use when breastfeeding. But some SNRIs may expose a baby to more medicine in breastmilk than others.    Atypical antidepressants. Some of these medicines are safe to take when breastfeeding.    Benzodiazepines. These medicines are often used for severe anxiety or agitation. But some are not advised for breastfeeding women.  Your healthcare provider will monitor you while you are on a medicine.   Antidepressants may take a few weeks to start working. But if you don t feel better in a few weeks, call your healthcare provider. Together you may decide to change the medicine, or to add another medicine.   Possible medicine risks for  babies  It's possible for antidepressant medicine to be passed to  babies in breastmilk. This amount is very low, and considered safe. Experts also note that the overall benefits of breastfeeding for both mom and baby outweigh any possible minor risk. It s also vital for a mom with postpartum depression to get the medicine that she needs.   But possible risks from medicine in your breastmilk may be a concern in some cases, such as if your baby:     Is sick    Is low birthweight    Was born early (premature)  If this is the case for you, talk with your healthcare provider and your baby s provider about what treatment options are safe for you and your baby.   Breastfeeding babies who are healthy don t often have side effects from antidepressants. But it is possible. Talk with your provider and your baby s provider about what side effects to watch for.   Treatment for nonbreastfeeding moms  Treatment options for postpartum depression are very similar for nonbreastfeeding women. Your healthcare provider will work with you to find a medicine that is safe for you. He or she will also likely advise that you try a combination of medicine and talk therapy.   Medicines that may be used if you are not breastfeeding include:    SSRIs (selective serotonin reuptake inhibitors)    SNRIs (serotonin-norepinephrine reuptake inhibitors)    Atypical antidepressants    Seratonin modulators  Medicine side effects for moms  Some antidepressants may have side effects. These are often short-term (temporary) and will go away on their own. These may include:     Upset stomach (nausea) or vomiting    Trouble sleeping    Weight gain or weight loss    Dry mouth    Dizziness    Lack of interest in sex    Diarrhea    Headaches  If any of these symptoms don t go away, or if they get in the way of your daily tasks, call your healthcare provider. He or she may have you try a different medicine.   When to call your healthcare provider  Call your healthcare provider right away if you are being treated for postpartum  depression and:     You have severe or abnormal side effects to medicine    Your side effects to medicine are getting in the way of your normal daily tasks  Call   Call  right away if you are being treated for postpartum depression and:     Your depression gets worse    You start thinking about hurting yourself, your baby, or others  Rhiannon last reviewed this educational content on 6/1/2020 2000-2021 [employer]. All rights reserved. This information is not intended as a substitute for professional medical care. Always follow your healthcare professional's instructions.

## 2021-06-04 NOTE — PROGRESS NOTES
Assessment/ Plan:         1. Amenorrhea  Pregnancy (Beta-hCG, Qual), Urine    US OB < 14 Weeks With Transvaginal   2. Missed period  Pregnancy (Beta-hCG, Qual), Urine    US OB < 14 Weeks With Transvaginal   3. Less than 8 weeks gestation of pregnancy  US OB < 14 Weeks With Transvaginal     Patient congratulated on her pregnancy.  Based on LMP she is 6 weeks 4 days pregnant with an expected date of delivery approximately 8/27/2020.  Encouraged her to continue her efforts at maintaining an active and healthy lifestyle.  I encouraged her to continue on the prenatal vitamin.  Advised patient to schedule her initial OB appointment between 10 and 12 weeks pregnant.  Placed an order for confirmation ultrasound.  Discussed with patient to obtain this between 7 and 8 weeks pregnant for dating purposes as well as viability.  Discussed and reviewed the early pregnancy folder.  This was provided to patient today.  Advised patient to notify clinic if she has any vaginal bleeding.  All questions were answered to the best my ability today.    She is in agreement with the above plan and will schedule her confirmation ultrasound in the next 1 to 2 weeks as well as schedule her IOB appointment between 10 and 12 weeks pregnant.    Subjective:      Shelly Salmeron is a 29 y.o. female who presents for pregnancy confirmation. LMP was 11/21/19.  This is a planned and welcomed pregnancy.  She is here today with her .  This is her first pregnancy.  She reports that her menstrual cycles are regular and typically 28 days.  She denies having any missed menstrual cycles or any significant early or late menses.  She is sure of her LMP.  She is taking a prenatal vitamin. She has been feeling very fatigued but otherwise overall well. She had norovirus last week and is recovering. Breast tenderness is present.  She is staying well hydrated. She is not exercising currently but does stay active.  She denies any vaginal bleeding or abnormal  "discharge.  No significant lower pelvic cramping or pain.  No significant past medical history.  She does mention that twins run in her family and she is curious to see if she is carrying twins.  At this time she reports that she is interested in following with the nurse midwives.    The following portions of the patient's history were reviewed and updated as appropriate: allergies, current medications and problem list.    There is no problem list on file for this patient.      No current outpatient medications on file.       Review of Systems   A 12 point comprehensive review of systems was negative except as noted.      Objective:      BP 96/62 (Patient Site: Right Arm, Patient Position: Sitting, Cuff Size: Adult Regular)   Pulse 64   Resp 12   Ht 5' 2.6\" (1.59 m)   Wt 129 lb 3.2 oz (58.6 kg)   LMP 11/21/2019 (Exact Date)   Breastfeeding No   BMI 23.18 kg/m      General appearance: alert, appears stated age and cooperative  Head: Normocephalic, without obvious abnormality, atraumatic   No further physical exam was completed today based on the nature of the visit.    Results for orders placed or performed in visit on 01/06/20   Pregnancy (Beta-hCG, Qual), Urine   Result Value Ref Range    Pregnancy Test, Urine Positive (!) Negative     25 minutes spent together with the patient today, more than 50% spent in counseling, discussing the above topics.        Sary Rivas CNP  9:27 AM  "

## 2021-06-05 NOTE — PROGRESS NOTES
PRENATAL VISIT   FIRST OBSTETRICAL EXAM - OB   Assessment / Impression   First prenatal visit at 11w4d        Plan:     -IOB labs drawn. Declined GC/CT.  -Pt is not interested in drawing lead level.   -Patient is not interested in waterbirth.   -Reviewed prenatal care schedule.   -Optimal nutrition and weight gain discussed. Pregnancy weight gain of 25-35 lbs (BMI 18.5-24.9) encouraged.   -Anticipatory guidance for common pregnancy questions and concerns reviewed.   -Danger s/sx for this trimester reviewed with patient.   -Reviewed genetic screening options with patient, patient does not elect for first trimester screening. The patient does not elect for quad screening.   -Reviewed carrier testing options with patient, patient does not elect for testing or referral to genetic counseling.  -IOB packet given and reviewed with patient.   -CNM services and hospital options reviewed; emergency and scheduling phone numbers given to patient.   -Return to clinic in 2 - 4 weeks  Total time spent with patient: 45 minutes, >50% time spent counseling and coordinating care.   Subjective:   Shelly Salmeron is a 29 y.o.  here today for her first obstetrical exam at 11w4d. Here with  Milind. This pregnancy is planned.  The patient reports nausea, but no vomiting, fatigue and some mild breast tenderness. She has a certain LMP. Patient's last menstrual period was 2019 (exact date)., predicting an expected date of delivery of Estimated Date of Delivery: 20. Last period was normal. Her pregnancy is dated by LMP confirmed by US.   The patient states that she is in a monogamous relationship. Offered GC/CT screening today and patient declined.   Current symptoms also include: breast tenderness and fatigue.   Risk factors: none.   Social History:   Education level: college   Occupation: Physical Therapist  Partners name: Milind  ?   OB History    Para Term  AB Living   1             SAB  "TAB Ectopic Multiple Live Births                  # Outcome Date GA Lbr John/2nd Weight Sex Delivery Anes PTL Lv   1 Current               History:   History reviewed. No pertinent past medical history.   Past Surgical History:   Procedure Laterality Date     WISDOM TOOTH EXTRACTION        Family History   Problem Relation Age of Onset     Breast cancer Mother 45     Hyperlipidemia Mother      Diabetes Father      Hypertension Maternal Uncle      Breast cancer Paternal Grandmother      Stroke Neg Hx       Social History     Tobacco Use     Smoking status: Never Smoker     Smokeless tobacco: Never Used   Substance Use Topics     Alcohol use: Not Currently     Alcohol/week: 2.0 - 3.0 standard drinks     Types: 2 - 3 Cans of beer per week     Drug use: No      Current Outpatient Medications   Medication Sig Dispense Refill     prenat.vits,srikanth,min-iron-folic (PRENATAL VITAMIN) Tab Take by mouth daily.       No current facility-administered medications for this visit.       No Known Allergies   The patient's medical, surgical and family histories were reviewed and were not pertinent to this visit.   Pap smear: Last Pap:12/17/18, Result: NILM. Next Due: 12/2021.       EPDS score today: 0   Review of Systems   General: Fatigue but otherwise denies problem   Eyes: Denies problem   Ears/Nose/Throat: Denies problem   Cardiovascular: Denies problem   Respiratory: Denies problem   Gastrointestinal: Nausea without vomiting, otherwise negative   Genitourinary: Denies any discharge, vaginal bleeding or itchiness or any other problem   Musculoskeletal: Breast tenderness otherwise denies problem   Skin: Denies problem   Neurologic: Denies problem   Psychiatric: Denies problem   Endocrine: Denies problem   Heme/Lymphatic: Denies problem   Allergic/Immunologic: Denies problem       Objective:   Objective    Vitals:    02/10/20 0919   BP: 108/66   Pulse: 72   Weight: 132 lb (59.9 kg)   Height: 5' 2.5\" (1.588 m)      Physical Exam: "   General Appearance: Alert, cooperative, no distress, appears stated age   HEENT: Normocephalic, without obvious abnormality, atraumatic. Conjunctiva/corneas clear  Neck: Supple, symmetrical, trachea midline, no adenopathy.   Thyroid: not enlarged, symmetric, no tenderness/mass/nodules   Back: Symmetric, no curvature, ROM normal,  Lungs: Clear to auscultation bilaterally, respirations unlabored   Heart: Regular rate and rhythm, S1 and S2 normal, no murmur, rub, or gallop. No edema to lower extremities.   Breasts: No breast masses, asymmetry, or nipple discharge. Nipples are  everted.   Abdomen: Gravid, soft, non-tender, bowel sounds active all four quadrants, no masses.   FHT: not audible by doptone  Pelvic: deferred per pt preference    Musculoskeletal: Extremities normal, atraumatic, no cyanosis   Skin: Skin color, texture, turgor normal, no rashes or lesions   Lymph nodes: Cervical, supraclavicular, and axillary nodes normal   Neurologic: Alert and oriented x 3. Normal speech   Lab:   Results for orders placed or performed in visit on 01/06/20   Pregnancy (Beta-hCG, Qual), Urine   Result Value Ref Range    Pregnancy Test, Urine Positive (!) Negative        Patient was seen with student who was present for learning.  I personally assessed, examined and made clinical decisions reflected in the documentation.     YULIET Guardado, CNM, IBCLC      JUJU Klein

## 2021-06-05 NOTE — PATIENT INSTRUCTIONS - HE
Welcome to [facility] and thank you for choosing us for your maternity care provider!  Congratulations!    [facility] - Contact information:  Appointment line and to get a hold of CNM in clinic Monday-Friday 8 am - 5 pm:  [phone].  There are some clinics with early start times (1st appointment 7:40 am) and others with evening hours (last appointment 6:20 pm).  Most are typically open from 8 am to 5 pm.    CNM on call answering service: [phone].  Specify your hospital of choice and leave a brief message for CNM;  will then page CNM who is on call at your specified hospital and you should receive a call back with 15 minutes.  Be sure that your ringer is audible and that you can accept blocked calls so that we can get back in touch with you! This number should be reserved for urgent needs if during the day, before 8 am, after 5 pm, weekends, holidays.      Pregnancy: Body Changes  From conception (fertilization) until after the birth of your child, you and your baby will change every day. To help you understand what is happening, we ve outlined how pregnancy begins and some of the changes you may notice.  How Pregnancy Begins  Conception is the union of a sperm and an egg. When it occurs, your baby s genetic makeup is complete, even its sex. Fertilization takes place in the fallopian tube. The fertilized egg then travels down this tube to the uterus (womb). The egg attaches to the lining of the uterus about a week later. There it grows and is nourished.    Your Changing Body  Pregnancy affects almost every part of your body. You may notice some of the following physical and emotional changes:    Your uterus expands outward and upward as your baby grows. You may feel pressure on your bladder, stomach, and other organs.    You may notice skin color changes on your forehead, nose, and cheeks. A dark line may form from your bellybutton down to your pubic area. The skin color around your  nipples and thighs may also change.    Pink stretch marks may appear on your abdomen, breasts, or hips.    Your hair may seem thicker. You lose less hair during pregnancy.    You may feel fine one day and weepy the next. This is caused by changes in your body, such as increased hormones (chemicals that affect the function of certain organs and also your moods).      Adapting to Pregnancy: First Trimester  As your body adjusts, you may have to change or limit your daily activities. You ll need more rest. You may also need to use the energy you have more wisely.  Eat stomach-friendly foods like cottage cheese, crackers, or bread throughout the day.    Your Changing Body  Almost every part of your body is affected as you adapt to pregnancy. The uterus and cervix will begin to soften right away. You may not look very pregnant during the first three months. But you are likely to have some common signs of early pregnancy:    Nausea    Fatigue    Frequent urination    Mood swings    Bloating of the abdomen    Missed or light periods (first trimester bleeding)    Nipple or breast tenderness, breast swelling      It s Not Too Late to Start Good Habits  What matters most is protecting your baby from this moment on. If you smoke, drink alcohol, or use drugs, now is the time to stop. If you need help, talk with your health care provider.    Smoking increases the risk of stillbirth or having a low-birth-weight baby. If you smoke, quit now.    Alcohol and drugs have been linked with miscarriage, birth defects, intellectual disability, and low birth weight. Do not drink alcohol or take drugs.    Tips to Relieve Nausea  Although nausea can occur at any time of the day, it may be worse in the morning. To help prevent nausea:    Eat small, light meals at frequent intervals.    Get up slowly. Eat a few unsalted crackers before you get out of bed.    Drink water with lemon slices.    Eat an ice pop in your favorite flavor.    Ask your  health care provider about taking alex or vitamin B6 for nausea and vomiting.    Talk with your health care provider if you take vitamins that upset your stomach.    Work Concerns  The end of the first trimester is a good time to discuss working during pregnancy with your employer. Follow your health care provider s advice if your job requires you to stand for a long time, work with hazardous tools, or even sit at a desk all day. Your workspace, workload, or scheduled hours may need to be adjusted. Perhaps you can change body postures more often or take an extra break.  Advice for Travel  Talk to your health care provider first, but the second trimester may be the best time for any travel. You may be advised to avoid certain trips while you re pregnant. Food and water can be concerns in developing countries. Travel by car is a good choice, as you can stop, get out, and stretch. Bring snacks and water along. Fasten the lap belt below your belly, low over your hips. Also be sure to wear the shoulder harness.  Intimacy  Unless your health care provider tells you to, there is no reason to stop having sex while you re pregnant. You or your partner may notice changes in desire. Desire may be less in the first trimester, due to nausea and fatigue. In the second trimester, sex may be very enjoyable. The third trimester can be a challenge comfort-wise. Try different positions and see what s best for you both.      Pregnancy: Your First Trimester Changes  The first trimester is a time of rapid development for your baby. Because your baby is growing so quickly, it is important that you start a healthy lifestyle right away. By the end of the first trimester, your baby has formed all of its major body organs and weighs just over an ounce.    Month 1 (Weeks 1-4)  The placenta (the organ that nourishes your baby) begins to form. The heart and lungs begin to develop. Your baby is about 1/4 inch long by the end of the first  month.    Month 2 (Weeks 5-8)  All of your baby s major body organs form. The face, fingers, toes, ears, and eyes appear. By the end of the month, your baby is about 1 inch long.    Month 3 (Weeks 9-12)  Your baby can open and close its fists and mouth. The sexual organs begin to form. As the first trimester ends, your baby is about 4 inches long.      Pregnancy: Your Weight  Being a healthy weight is important for both you and your baby. The weight you gain now is not just extra fat. It is also the weight of your baby. And it is the increased blood and fluids to support the baby. A slow, steady rate of gain is best. How much you should gain depends on your weight before getting pregnant. Check with your health care provider to find out what is right for you.    If You Gain Too Much  Gaining too much weight might cause you to feel tired or you could have a harder pregnancy or birth. If you and your health care provider decide you re gaining too much:    Eat fewer fats and sugars. Instead, eat fruit, vegetables, and whole-grain foods.    Drink plenty of water between meals.    Get at least 20 minutes of light exercise, such as walking, each day.    Don t diet. You might not get enough of the nutrients you or your baby needs.    Keep a diet diary to help you gauge what and how much you are eating .    If You re Not Gaining Enough  If you don t gain enough, your baby could be too small or have health problems. Women tend to gain most of their weight in the second and third trimesters. For now:    Eat many types of foods. Make sure you get enough calcium, protein, and carbohydrates.    Don t skip meals.    Eat healthy snacks.    Pick nutrient-dense, high calorie healthy food like trail mix or protein shakes.    See a dietitian for help.    Talk to your healthcare provider if you have had an eating disorder or problems with certain foods.      Pregnancy: Common Questions  There are plenty of myths and  old wives  tales   surrounding pregnancy. You may need help  fact from fiction. On this sheet, you ll find answers to a few common questions. If you have other questions, talk with a midwife.    Will Working Harm My Baby?  In most cases, working throughout your pregnancy is not harmful at all. There may be concerns if the job involves dangerous machinery or chemicals, lifting, or standing for very long periods of time. Talk to your health care provider and employer about your particular job and pregnancy.  Why Can t I Change the Cat Litter Box?  Cats carry a disease called toxoplasmosis. In adult humans, it shows up as a mild infection of the blood and organs. If you are infected during pregnancy, the baby s brain and eyes could be damaged. To be safe, have someone else change the litter. If you must handle it, wear a paper mask over your nose and mouth. Also, wear gloves and wash your hands afterward.  Which Medications Are Safe?  No prescription or over-the-counter drug is safe for everyone all of the time. But sometimes medications are needed. Be sure your health care provider knows you are pregnant. Then use only the medications he or she advises you to take. Please refer to the below resources for further information and discuss concern and questions with your midwife.  Is It True That I Can Overheat My Baby?  Yes. To avoid making your baby too warm:    Don t sit in a jacuzzi. A long, warm bath is fine, but not in water over 100 F.    Exercise less intensely if you feel fatigued. Base your workout on how you feel, not your heart rate. Heart rates aren t a good way to measure effort during pregnancy.  Can I Lift and Carry Safely?  Yes, if your health care provider doesn t tell you otherwise. Learn to lift and carry safely to avoid injury and reduce back pain during pregnancy. To protect your back:    Bend at the knees to bring the load nearer.    Get a good . Test the weight of the load.    Tighten your abdomen.  Exhale as you lift.    Lift with your legs, not with your back.    Carry the load close to your body.    Hold the load so you can see where you are going.  What If I Get Sick?  Most women get sick at least once during pregnancy. Talk with your health care provider if you do. Most likely it will not affect your pregnancy. Get plenty of rest and fluids, and eat what you can. Talk to your health care provider before taking any medications.        HEALTHY PREGNANCY CARE: 10-14 WEEKS PREGNANT     By weeks 10 to 14 of your pregnancy, the placenta has formed inside your uterus. It may be possible to hear your baby's heartbeat with a doppler ultrasound device. Your baby's eyes can and do move. The arms and legs can bend.    GENETIC SCREENING OPTIONS AT [facility]                All testing is optional. We don t recommend or discourage any test; it is totally up to you and your partner. Some couples wish to know their risk of having a baby with a genetic defect and others do not. We will support your decision. Abnormal results may lead to a discussion of options for further testing.    Accurate dating of your pregnancy is important for all testing so an ultrasound may be done prior to referral or testing.    No testing provides certainty; there are false positives and negatives associated with all testing, some more than others.    Most genetic testing is non-invasive (requires only a blood sample and sometimes an ultrasound or both).    It is always wise to check with your insurance carrier before proceeding.    Some testing can be done at our lab and some require a referral.    If you decide to do no testing, the 20 week ultrasound scan, which is a routine or standard ultrasound, has some ability to detect abnormalities in the baby, and identifies obstetric problems.    If you are over 35, you will have the option of a Level II ultrasound, which is a more detailed and targeting scan, that helps detect fetal anomalies as  well as obstetric problems.      If you have a more complex family history of chromosomal abnormalities, a referral to a genetic counselor and/or a Maternal Fetal Medicine specialist, to help identify available tests, may be recommended.    TYPES OF GENETIC TESTING AVAILABLE INCLUDE:    Carrier Screening/Testing for Genetic Conditions    There are many inherited conditions for which testing or carrier testing is available. Carrier screening can test for conditions like Cystic Fibrosis, Thalassemia, Rashel Sachs, Sickle Cell, Hemophilia, Muscular Dystrophy, Michael s disease and many others.     Cystic Fibrosis (CF) affects both males and females and people from all racial and ethnic groups. However, the disease is most common among Caucasians of Northern  descent. CF is also common among Latinos and American Indians. The disease is less common among  Americans and  Americans. More than 10 million Americans are carriers of a faulty CF gene and many of them don't know that they are CF carriers. One or both parents can be tested any time before or during pregnancy.    Talk to your care provider about your family history and whether you should be screened. A referral to a genetic counselor at [facility] can be made by your care provider at any time.    This is also tested for in the Isabel Metabolic Screen that your infant receives 24 hours after birth.     Fetal DNA cell Testing: [place] or Innatal (Non-Invasive Prenatal Testing)    At 10 wk or greater, a blood sample can be drawn here at clinic or at any of our referral offices. It will provide highly accurate results with low false positive rates for trisomy 18, trisomy 21 (Down Syndrome), and trisomy 13 (> 99% trisomy detection rate at a false positive rate of <0.1%). Gender identification can also be obtained if desired (98% accuracy).  Can also detect some sex-linked chromosomal abnormalities (80-90% accuracy).   This is often done if one of the other screening tests is abnormal.        1st Trimester Screening:     Everyone has an age related risk of having a baby with a genetic abnormality. This testing provides a risk profile which is better than assigning risk based solely on your age.    Refines your risk of having a baby with a chromosomal abnormality such as Trisomy 21 & 18.    This test with detect a fetus with one of these disorders about 85% of the time.    A thickened nuchal fold can also be associated with cardiac defects.    This test requires a blood collection combined with ultrasound to obtain a measurement of fluid at back of baby s neck (nuchal translucency).    False positive results occur approximately 5% of cases.    An additional blood sample may be necessary in order to calculate your risk of having a baby with a neural tube defect (e.g. spina bifida).  This is called the AFP test (alpha fetal protein).  An ultrasound should be able to  any spinal defect as well.    Follow-up of an abnormal test may include a more extensive ultrasound study and you may be offered an amniocentesis (a small sample of amniotic fluid is withdrawn and studied) for a definitive diagnosis    Quad Screen (4-marker screen)    Between 15 and 21 weeks, a sample of blood can be drawn at our lab to assess your risk of having a baby with Down Syndrome, Trisomy 18 and neural tube defects. Such testing is able to detect these conditions in 80% of cases and the false-positive rate is approximately 5%.     Follow-up of an abnormal test may include a more extensive ultrasound study and you may be offered an amniocentesis (a small sample of amniotic fluid is withdrawn and studied) for a definitive diagnosis      Referrals opportunities include:    [facility],  [facility]  o Offers nuchal translucency ultrasound; does not offer genetic counseling.    [place]  [facility]  [facility] ([facility]):   o Approximately an hour long visit includes 30 min with genetic counselor who discusses all testing available and which ones might be beneficial to you based on age, personal and family history.    o Blood will be drawn and the nuchal translucency ultrasound will be discussed and performed if desired. The Free Fetal DNA testing ([place]/[employer]) can be drawn also.  o Targeted or detailed Level II ultrasounds are also available with these perinatology groups.        Breastfeeding: a Healthy Option for You and Your Baby  Consider breastfeeding for the healthiest way to feed your baby. Ask your midwife or physician for more information.     The choice of how you will feed your baby is important.  Before your baby s birth, you ll want to learn about the benefits of breastfeeding.  [facility] have been designated Baby Friendly; an initiative that was created by the World Health Organization and UNICEF.  This helps give you and your baby the best start in feeding their baby.    Why should I breastfeed my baby?    Babies are less likely to develop childhood obesity or diabetes    Babies are less likely to suffer from recurrent ear infections    Babies are less likely to be hospitalized for respiratory conditions    Breast milk is rich in nutrients and antibodies-it is easy to digest    How does it benefit me?    Lowers the risk for diabetes, breast and ovarian cancer and postpartum depression    Moms can lose  baby weight  more quickly    Cost savings - formula can cost well over $1,500 per year    Convenient - no bottles and nipples to sterilize, no measuring and mixing formula    The physical contact with breastfeeding can make babies feel secure, warm and comforted     What about formula?  While you and your baby are staying with us at [facility], we will support whatever feeding choice you make for your baby.    Some important considerations:      The American Academy of  Pediatrics, the World Health Organization, and many more organizations recommend exclusive breastfeeding for 6 months and continued breastfeeding while adding other foods for the first 1-2 years.      Any amount of breastmilk has benefits to both baby and mother.    Giving formula in replacement of breastfeeding can affect mother s milk supply.  If formula is needed, hospital staff will work with you on a plan to help develop your milk supply.    Formula alters the natural growth of good bacteria in the  stomach.     Research has found that first time mothers who offer formula in the hospital have a shorter duration of breastfeeding.    How can I start to prepare?     Start by having a conversation with your medical provider.     Talk with your partner, family and friends.     Attend a prenatal class that includes breastfeeding preparation. Birth and breastfeeding classes are offered by [employer]. Visit [facility] for class information.     After your baby s birth, hospital staff and lactation consultants will help you and your baby get off to a great start with breastfeeding.    As your center of gravity and weight changes, use good body mechanics when changing positions and lifting. For example, use a straight back and your legs for support when lifting instead of bending over. Maintain good posture to prevent straining your muscles. Now is a good time to continue or restart your exercise program. Walking 30-60 minutes daily is an excellent way to keep fit. Yoga and swimming also offer many benefits.    The nausea and fatigue of early pregnancy have usually started to let up, so this is a good time to focus on nutrition. Consider attending a nutrition class. A healthy diet includes about 60 grams of protein each day (3-4 servings of dairy, 2-3 servings of meat/fish/poultry/nuts), 4-6 servings of whole grain foods, and 5-6 servings of fruits and vegetables. Remember to drink 6-8  glasses of water daily.    Watch for warning signs, such as     vaginal bleeding    fluid leaking from your vagina    severe abdominal pain    nausea and vomiting more than 4-5 times a day, or if you are unable to keep anything down    fever more than 100.4 degrees F.       RESOURCES   You can refer to the Starting Out Right book or find it online at [email or web address]/images/stories/[email or web address]-Starting-Out-[email or web address] or [email or web address]/images/stories/flipbooks/[facility]-starting-out-right/[facility]-starting-out-right.html#p=[email or web address]    You can sign up for a weekly parenting e-mail that gives support, tips and advice from health care professionals that starts with pregnancy and continues through the toddler years. To register, go to [email or web address] at any time during your pregnancy.    Breastfeeding:    OUTPATIENT LACTATION RESOURCES    [facility] and clinics: [email or web address]       -Schedule an appointment with a [facility] CNM who is also a Lactation Consultant by calling [phone]     -Schedule an appointment with a [facility] CNM who is also a Lactation Consultant by calling [phone]. We see women for breastfeeding visits at [facility] and [facility].     -Baby Café    Pregnant and interested in breastfeeding?  Need answers to breastfeeding questions?  Want to help breastfeeding moms?  Already breastfeeding and want to meet other moms?    Join us at the Baby Café!    Baby Cafe is a free, drop-in service offering breast-feeding support for pregnant women, breast-feeding mothers and their families.  Come share tips and socialize with other mothers.  Babies and siblings are welcome (no childcare available).    Starting April 2018, Baby Café will be at 4 locations.  Please see below for the Baby Café closest to you! Hmong, Estonian, and Australian which is may be available at some  sites.      [facility]  [address] [place] [address]  1st Wednesday: 10am-12pm    [facility]  [address] [place] [address]  3rd Wednesday 4-6pm    [facility]  [address] [place] [address]  4th Wednesday 10am-12:30pm    [facility]  [address] [place] [address]  4th Wednesdays: 4-6pm    -Attend a baby weigh in at [facility].  Lactation consultants are available to answer questions  [place]: Tuesdays 1:00 - 2:00  [place], [facility]: Mondays 1:00 - 2:00   [email or web address]    -Attend one of the New Mama groups at [facility] in [facility].  [facility] also offers one-on-one in home and in office lactation consults.   [email or web address]    -Attend a Kitty League meeting.  Multiple groups in several locations throughout the [facility]. The meetings are no-cost and always informative breastfeeding education session through [employer] La Leche League  [email or web address]    Childbirth and Parenting Education:   [facility]: http://[facility] HospitalSilverBack [email or web address]/   (341) 123-BABY  [employer]: (education, yoga & wellness) www.[employer]  [facility]: www.BringMeThatmaTechFaith   Childbirth collective: (Parent topic nights)  www.childbirthcollective.org/  Hypnobabies:  www.hypnobabiestwincities.com/  Hypnobirthing:  Http://hypnobirthing.com/    Book Recommendations:   Jasmine Philadelphia's Birthing From Within--first few chapters include a new-age tone, you may prefer to skip it and keep going, because there is good stuff later.  This book recommendation covers emotional preparation, but does cover coping with pain, and use of both pharmacological and nonpharmacological methods.    Dr. Hickman' The Pregnancy Book and The Birth Book--the pregnancy book goes month-by month    Womanly Art of Breastfeeding by La Leche League International   Bestfeeding by Nisha Song--great pictures    Mothering Your Nursing  "Toddler, by Holly Arias.   Addresses dealing with so many of the challenging behaviors of a nursing toddler.  How Weaning Happens, by La Leche League.  Discusses weaning at all ages, from medically necessary weaning of an infant, all the way up to age 5 (or older), with why/why not, and strategies.  Very empowering book both for deciding to wean and deciding not to.    American College of Nurse-Midwives (ACNM) http://www.midwife.org/; look at the informational handouts at http://www.midwife.org/Share-With-Women     www.mymidwife.org    Mother to Baby (Medication and Herbal guidance in pregnancy): http://www.mothertobaby.org  Toll-Free Hotline: 221.815.5740  LactMed (Medication use while breastfeeding): http://toxnet.nlm.nih.gov/newtoxnet/lactmed.htm    Women's Health.gov:  http://www.womenshealth.gov/a-z-topics/index.html    American pregnancy association - http://americanpregnancy.org    Centering Pregnancy (group prenatal care option): http://centeringhealthcare.org    Information about doulas:  Childbirth collective: http://www.childbirthcollective.org/  Doulas of [place] (HAI):  www.hai.org  [facility]  project: http://twincitiesdoulaproject.com/     Early Childhood and Family Education (ECFE):  ECFE offers parents hands-on learning experiences that will nourish a lifetime of teachable moments.  http://ecfe.info/ecfe-home/    March of Dimes www.marchofdimes.com     FDA - Nutrition  www.mypyramid.gov  Under \"For Consumers,\" click on \"pregnant and breastfeeding women.\"      Centers for Disease Control and Prevention (CDC) - Vaccines : http://www.cdc.gov/vaccines/       When researching information on the web, question the validity of websites.  The domains .gov, .edu and.org tend to be more reliable information.  If there are a lot of advertisements, be cautious of the information provided. Stay away from blogs and chat rooms please!      Nutrition & supplements:     Prenatal vitamin (those " with 600-1000 mcg folic acid and 27 mg of iron are enough).  Take with food or Juice     4-5 servings of dairy or other calcium rich foods (fish, leafy greens, soy) per day - if not, take 500-1000 mg additional calcium (Tums, pills, chews). Take with dairy     Vitamin D3 6881-3519 IU geltab daily.  Take with fattiest meal.  Look for fortified foods also (Dairy, Juice)     2-3 (4) oz servings of fish, seafood, nuts (walnuts & almonds), oils, avocado per week - if not, take Omega 3 Fatty acids: DHA & CYRIL 2417-4980 mg per day.  Other names: cod liver oil, fish oil. Take with fattiest meal.  Some prenatals have DHA, but typically not a sufficient dose.    Fish: Do not eat shark, swordfish, petros mackerel, or tilefish when you are pregnant or breastfeeding.  They contain high levels of mercury.  Limit white (albacore) tuna to no more than 6 ounces per week. Http://www.fda.gov/downloads/ForConsumers/ConsumerUpdates/WEP342147.pdf         Touring the [facility]  To schedule a tour at either [place] or [facility], please do so online using the following links:  [facility] - [email or web address]&[email or web address]&[email or web address]&jnz=202  [facility] - [email or web address]&[email or web address]&[email or web address]&[email or web address]&[email or web address]&hxp=149     You are invited to  Meet the [facility]  A way to tour the hospital Labor and Delivery unit and meet the midwives that attend births since you may not have the opportunity to meet them during your prenatal care.  Some sessions are informal meet and greet type social hours, others address a specific concern or topic.    Tuesday, Februrary 12, 2019 7-8pm  [facility]    Wednesday, April 17, 2019 7-8pm  [facility], Auditorium A    Thursday, August 15, 2019 7-8pm  [facility], [facility]    Wednesday November 13, 2019 7-8  pm  [facility], Auditorum A    Please call [phone] to register

## 2021-06-06 NOTE — PATIENT INSTRUCTIONS - HE
Visit actually [facility] Nurse Midwives - Contact information:  Appointment line and to get a hold of CNM in clinic Monday-Friday 8 am - 5 pm:  [phone].  There are some clinics with early start times (1st appointment 7:40 am) and others with evening hours (last appointment 6:20 pm).  Most are typically open from 8 am to 5 pm.    CNM on call answering service: [phone].  Specify your hospital of choice and leave a brief message for CNM;  will then page CNM who is on call at your specified hospital and you should receive a call back with 15 minutes.  Be sure that your ringer is audible and that you can accept blocked calls so that we can get back in touch with you! This number should be reserved for urgent needs if during the day, before 8 am, after 5 pm, weekends, holidays.    Contact the on-call CNM with warning signs, such as:    vaginal bleeding     Vaginal discharge and itching or pain and burning during urination    Leg/calf pain or swelling on one side    severe abdominal pain    nausea and vomiting more than 4-5 times a day, or if you are unable to keep anything down    fever more than 100.4 degrees F.         Touring the [facility]  To schedule a tour at either [place] or [facility], please do so online using the following links:  [facility] - [email or web address]&[email or web address]&[email or web address]&[email or web address]&[email or web address]  [facility] - [email or web address]://[email or web address]&[email or web address]&[email or web address]&[email or web address]&[email or web address]&hqd=687         You are invited to  Meet the [facility] Nurse-Midwives  A way to tour the hospital Labor and Delivery unit and meet the midwives that attend births since you may not have the opportunity to meet them during your prenatal care.  Some sessions are informal meet and greet type social hours, others address a specific concern or topic.    Tuesday, Februrary 12, 2019 7-8pm  [facility]  [facility]    Wednesday, April 17, 2019 7-8pm  [facility], Auditorium A    Thursday, August 15, 2019 7-8pm  [facility]    Wednesday November 13, 2019 7-8 pm  [facility], Auditorum A    Please call [phone] to register      Ultrasound Appointment:   Don't forget to schedule your ultrasound appointment around 20 weeks into your pregnancy. Your midwife will order the exam for you to schedule at [phone] with [facility] radiology locations or at the independent radiology clinic of your preference.      GENETIC SCREENING OPTIONS AT [facility]          All testing is optional. We don t recommend or discourage any test; it is totally up to you and your partner. Some couples wish to know their risk of having a baby with a genetic defect and others do not. We will support your decision. Abnormal results may lead to a discussion of options for further testing.    Accurate dating of your pregnancy is important for all testing so an ultrasound may be done prior to referral or testing.    No testing provides certainty; there are false positives and negatives associated with all testing, some more than others.    Most genetic testing is non-invasive (requires only a blood sample and sometimes an ultrasound or both).    It is always wise to check with your insurance carrier before proceeding.    Some testing can be done at our lab and some require a referral.    If you decide to do no testing, the 20 week ultrasound scan has some ability to detect abnormalities in the baby.    [place] or [facility]    At 10 wk or greater, a blood sample can be drawn here at clinic or at any of our referral offices. It will provide highly accurate results with low false positive rates for trisomy 18, trisomy 21 (Down Syndrome), and trisomy 13 (> 99% trisomy detection rate at a false positive rate of <0.1%). Gender identification can also be obtained if desired.    Quad Screen  (4-marker screen)    Between 15 and 21 weeks, a sample of blood can be drawn at our lab to assess your risk of having a baby with Down Syndrome, Trisomy 18 and neural tube defects. Such testing is able to detect these conditions in 80-90% of cases and the false-positive rate is approximately 5%.     Why is dental care in pregnancy important?  During pregnancy, you are more likely to have problems with your teeth or gums. If you have an infection in your teeth or gums, the chance of your baby being premature (born early) or having low birth weight may be slightly higher than if your teeth and gums are healthy  Dental care is safe during pregnancy and important for the health of you and your baby.   What should you know before you see the dentist?    Make sure your dentist knows that you are pregnant.    If medications for infection or for pain are needed, your dentist can prescribe ones that are safe for you and your baby.    Tell your dentist about any changes you have noticed since you became pregnant and about any medications r or supplements you are taking.    Routine x-rays should be avoided in pregnancy, but it may be necessary if there is a problem or an emergency.     Your body should be covered with a lead apron to protect you and your baby.    Dental work can be done safely at any point in pregnancy. If possible, it is best to delay treatments and pro- cedures until after the first trimester.    For more information on dental health in pregnancy: http://onlinelibrary.alarcon.com/store/10.1111/jmwh.17421/asset/rpzk22473.pdf?v=1&t=qeup1e81&p=[email or web address]     Quickening:   Your Baby in the Second Trimester of Pregnancy  ; At the start of the second trimester, you will feel your baby's movements, which get stronger as the baby grows bigger. At the end of the fourth month, your baby weighs about five ounces. Her kidneys begin to produce urine. During visits to your health care provider,  you will be able to hear your baby's heartbeat more clearly. Your baby can move and hear your voice.   ; By the end of the fifth month, you'll be able to feel light movements (called quickening) of your fetus. Your baby is sleeping and waking at regular intervals, and is more active than before. At this point, she is about nine inches long and weighs about one-half to one pound. During the sixth month, your baby's features become clearer. Eyebrows, eyelashes, and hair are developing. She also has finger and toe prints, and may be kicking strongly.  ; By the end of the second trimester, your baby weighs as much as two pounds and is about 11 inches long.         Gestational diabetes  Gestational diabetes develops during pregnancy (gestation). Like other types of diabetes, gestational diabetes affects how your cells use sugar (glucose). Gestational diabetes causes high blood sugar that can affect your pregnancy and your baby's health.  Any pregnancy complication is concerning, but there's good news. Expectant moms can help control gestational diabetes by eating healthy foods, exercising and, if necessary, taking medication. Controlling blood sugar can prevent a difficult birth and keep you and your baby healthy.  In gestational diabetes, blood sugar usually returns to normal soon after delivery. But if you've had gestational diabetes, you're at risk for type 2 diabetes. You'll continue working with your health care team to monitor and manage your blood sugar.    Who is at risk?  This is a list of factors that increase the risk of developing gestational diabetes for women during pregnancy:        Overweight prior to pregnancy (20% or more over ideal body weight)        High risk ethnic group: , , ,         Impaired glucose tolerance or traces of glucose in the urine        Family history of diabetes        Previously giving birth to a baby over 9 lbs. or stillborn         Previous pregnancy with gestational diabetes    Prevention:  There are no guarantees when it comes to preventing gestational diabetes -- but the more healthy habits you can adopt before pregnancy, the better. If you've had gestational diabetes, these healthy choices may also reduce your risk of having it in future pregnancies or developing type 2 diabetes down the road.    Eat healthy foods. Choose foods high in fiber and low in fat and calories. Focus on fruits, vegetables and whole grains. Strive for variety to help you achieve your goals without compromising taste or nutrition. Watch portion sizes.     Keep active. Exercising before and during pregnancy can help protect you from developing gestational diabetes. Aim for 30 minutes of moderate activity on most days of the week. Take a brisk daily walk. Ride your bike. Swim laps.  If you can't fit a single 30-minute workout into your day, several shorter sessions can do just as much good. Park in the distant lot when you run errands. Get off the bus one stop before you reach your destination. Every step you take increases your chances of staying healthy.    Lose excess pounds before pregnancy. Doctors don't recommend weight loss during pregnancy. But if you're planning to get pregnant, losing extra weight beforehand may help you have a healthier pregnancy.  Focus on permanent changes to your eating habits. Motivate yourself by remembering the long-term benefits of losing weight, such as a healthier heart, more energy and improved self-esteem.    Preventing Diabetes after Pregnancy:  It is estimated that 35-60 percent of women that have had gestational diabetes will develop type 2 diabetes in the future. It is also thought that children from these pregnancies have a greater chance of developing obesity and type 2 diabetes.    If you do have prediabetes or have risk factors for having diabetes, research shows that doing just two things can help you prevent or delay  type 2 diabetes: Lose 5% to 7% of your body weight, which would be 10 to 14 pounds for a 200-pound person; and get at least 150 minutes each week of physical activity, such as brisk walking.    RESOURCES   You can refer to the Starting Out Right book or find it online at http://www.[facility]althe[facility].org/images/stories/maternity/[facility]-Starting-Out-Right.pdf or http://www.healtheast.org/images/stories/flipbooks/[facility]-starting-out-right/[facility]-starting-out-right.html#p=8    You can sign up for a weekly parenting e-mail that gives support, tips and advice from health care professionals that starts with pregnancy and continues through the toddler years. To register, go to [email or web address] at any time during your pregnancy.    Breastfeeding Information:  OUTPATIENT LACTATION RESOURCES    -Schedule a clinic appointment with a [facility] CNM with dedicated clinic hours for breastfeeding assistance by calling [phone]. Breastfeeding clinic visits are at [facility] on Wednesdays, [facility] on Tuesdays and [facility] on Thursdays.     -Baby Café    Pregnant and interested in breastfeeding?  Need answers to breastfeeding questions?  Want to help breastfeeding moms?  Already breastfeeding and want to meet other moms?    Join us at the Baby Café!    Baby Cafe is a free, drop-in service offering breast-feeding support for pregnant women, breast-feeding mothers and their families.  Come share tips and socialize with other mothers.  Babies and siblings are welcome (no childcare available).    Starting April 2018, Baby Café will be at 4 locations.  Please see below for the Baby Café closest to you!      [facility]  [address] [place] [address]  1st Wednesday: 10am-12pm    [facility]  [address] [place] [address]  3rd Wednesday 4-6pm    [address] [address]  4th Wednesday 10am-12:30pm    [facility]  [address]  [place] [address]  4th Wednesdays: 4-6pm      Hmong, Nepali, and Czech which is may be available at some sites.    For more information, please contact: [email or web address] or [phone]    -Attend a baby weigh in at [facility].  Lactation consultants are available to answer questions  [place]: Tuesdays 1:00 - 2:00  [facility]: Mondays 1:00 - 2:00   www.[employer]    -Attend one of the New Mama groups at [facility] in [facility].  [facility] also offers one-on-one in home and in office lactation consults.   www.[employer]    -Attend a [facility] meeting.  Multiple groups in several locations throughout the [facility]. The meetings are no-cost and always informative breastfeeding education session through [facility] La Leche League  Www.Habbitsndas.org/  Medication use while breastfeeding: http://toxnet.nlm.nih.gov/newtoxnet/lactmed.htm     Childbirth and Parenting Education:   [facility]: http://Sierra Vista HospitalInfogami/   [phone]  [place]: (education, yoga & wellness) www.Wurl  [facility]: www.Knimbus   Childbirth collective: (Parent topic nights)  www.childbirthcollective.org/  Hypnobabies:  www.hypnobabiestwincities.com/  Hypnobirthing:  Http://hypnobirthing.com/    Book Recommendations:   Jasmine Jordy's Birthing From Within--first few chapters include a new-age tone, you may prefer to skip it and keep going, because there is good stuff later.  This book recommendation covers emotional preparation, but does cover coping with pain, and use of both pharmacological and nonpharmacological methods.    Dr. Hickman' The Pregnancy Book and The Birth Book--the pregnancy book goes month-by month    Womanly Art of Breastfeeding by [employer]   Bestfeeding by Nisha Song--great pictures    Mothering Your Nursing Toddler, by Holly Arias.   Addresses dealing with  "so many of the challenging behaviors of a nursing toddler.  How Weaning Happens, by La Leche League.  Discusses weaning at all ages, from medically necessary weaning of an infant, all the way up to age 5 (or older), with why/why not, and strategies.  Very empowering book both for deciding to wean and deciding not to.    American College of Nurse-Midwives (ACNM) http://www.midwife.org/; look at the informational handouts at http://www.midwife.org/Share-With-Women     www.mymidwife.org    Mother to Baby (Medication and Herbal guidance in pregnancy): http://www.mothertobaby.org  Toll-Free Hotline: 827.449.6428  LactMed (Medication use while breastfeeding): http://toxnet.nlm.nih.gov/newtoxnet/lactmed.htm    Women's Health.gov:  http://www.womenshealth.gov/a-z-topics/index.html    American pregnancy association - http://americanpregnancy.org    Centering Pregnancy (group prenatal care option): http://centeringhealthcare.org    Information about doulas:  Childbirth collective: http://www.childbirthcollective.org/  Doulas of [place] (MARINA):  www.marina.org  [facility]  project: http://twincitiesdoulaproject.com/     Early Childhood and Family Education (ECFE):  ECFE offers parents hands-on learning experiences that will nourish a lifetime of teachable moments.  http://ecfe.info/ecfe-home/    March of Dimes www.[employer].com     FDA - Nutrition  www.mypyramid.gov  Under \"For Consumers,\" click on \"pregnant and breastfeeding women.\"      Centers for Disease Control and Prevention (CDC) - Vaccines : http://www.cdc.gov/vaccines/       When researching information on the web, question the validity of websites.  The domains .gov, .edu and.org tend to be more reliable information.  If there are a lot of advertisements, be cautious of the information provided. Stay away from blogs and chat rooms please!    "

## 2021-06-06 NOTE — PROGRESS NOTES
Shelly presents with Latter day at 16/4 weeks  Feeling well.   She has been careful to limit her exposure to others. Works as PT, she has been still going to works. They have good hand hygiene, and are trying to distance themselves from others  Latter day works for the city of [employer], mainly works in an office, trying to socially distance himself as well. He may be able to work from home some.   Shelly and Latter day continue to decline genetic screening  Shelly and Latter day would like to do their 20 week FAS at [facility]. Encouraged Shelly to schedule her visit with the midwives after her US  Exercise: not exercising much, will start to go on walks now that the weather is getting nicer  Nutrition: eating small frequent meals  Shelly has ezcema, Shelly has noticed this is worsening the last few weeks. She typically uses a cream for her eczema but doesn't remember the name of it. Advised that she try 2.5% hydrocortisone for eczema on her hands and belly and under breasts. Also suggested she apply Aquaphor Eucerin ointment after washing and especially at night with socks over her hands to seal in the moisture. If her eczema persists/worsens will prescribe stronger steroid cream, encouraged Shelly to look at her records to see if she can determine what cream has worked for her in the past.

## 2021-06-06 NOTE — PROGRESS NOTES
Shelly and Buddhist are here for a routine prenatal visit and to check FHT. Pleased to hear FHT!  She has been feeling well over the last 2 weeks and is pleased to have more energy.  Having some difficulty falling asleep and staying asleep but in general this only happens 1-2 times per week and she is feeling generally well rested.  Doing some stretching and pelvic floor/core exercises.  No cardio, discussed daily walking.  Nausea is improving, dry heaving only rarely.  Some round ligament pain, comfort measures reviewed and prevention discussed.  Reviewed normal IOB labs.  Continues to decline genetic screening.  Early second trimester anticipatory guidance given.

## 2021-06-07 NOTE — PROGRESS NOTES
Shelly is here alone today for routine prenatal visit at 20 weeks and 4 days.  Reviewed normal anatomy ultrasound, happy to be expecting a boy.  Placenta is anterior, thinks this is likely related to feeling less fetal movements.  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  Milind is working from home.  Her eczema continues to be bothersome, she has been using topical hydrocortisone as prescribed at her last visit and is taking Benadryl to help her sleep.  She notes itching on her belly, hands, and her breasts, and in her armpits.  .  She denies itching of her palms of her hands of the soles of her feet.  Small rash noted on hands, widespread blisters.She accepts a dermatology referral and understands that at this may be a telemedicine visit.  Encouraged to report any itching of the palms of her hands or the soles of her feet.     Reviewed recommendation for daily iron supplementation for all pregnant women at this time, she is not currently supplementing and will begin once daily; given list of options for supplementation  and comfort measures to prevent constipation. We discussed current recommendations for prenatal appointments with some inperson visits and telephone visits when appropriate.  Reviewed how to reach CNM with non-urgent and urgent concerns between visits.

## 2021-06-07 NOTE — PATIENT INSTRUCTIONS - HE
[facility] Nurse Midwives - Contact information:  Appointment line and to get a hold of CNM in clinic Monday-Friday 8 am - 5 pm:  [phone].  There are some clinics with early start times (1st appointment 7:40 am) and others with evening hours (last appointment 6:20 pm).  Most are typically open from 8 am to 5 pm.    CNM on call answering service: [phone].  Specify your hospital of choice and leave a brief message for CNM;  will then page CNM who is on call at your specified hospital and you should receive a call back with 15 minutes.  Be sure that your ringer is audible and that you can accept blocked calls so that we can get back in touch with you! This number should be reserved for urgent needs if during the day, before 8 am, after 5 pm, weekends, holidays.    Contact the on-call CNM with warning signs, such as:    vaginal bleeding     Vaginal discharge and itching or pain and burning during urination    Leg/calf pain or swelling on one side    severe abdominal pain    nausea and vomiting more than 4-5 times a day, or if you are unable to keep anything down    fever more than 100.4 degrees F.          You are invited to  Meet the [facility] Nurse-Midwives  A way to tour the hospital Labor and Delivery unit and meet the midwives that attend births since you may not have the opportunity to meet them during your prenatal care.  Some sessions are informal meet and greet type social hours, others address a specific concern or topic.    2019 7-8pm  [facility]    2019 7-8pm  [facility]    Thursday, August 15, 2019 7-8pm  [facility]    2019 7-8 pm  [facility], [facility]    Please call [phone] to register      UNDERSTANDING  LABOR    Going into labor before your 37th week of pregnancy is called  labor.   labor can cause your baby to be born too soon. This can lead to a number of health problems that may affect your baby. From 28-35 weeks, Patients are advised to be evaluated at [facility] since they have a  Intensive Care Unit (NICU).  -Before labor, the cervix is thick and closed.  -In  labor, the cervix begins to efface (thin) and dilate (open) over a short period of time    Symptoms of  Labor  If you believe you re having  labor, contact the midwives right away. But contractions alone don t mean you re in  labor. What matters more are changes in your cervix (the lower end of the uterus). Symptoms of  labor include:    five or more contractions per hour    Strong & frequent contractions    Constant menstrual-like cramping    Low-back pain    Mucous or bloody vaginal discharge    Bleeding or spotting in the second or third trimester    Evaluating  Labor  Your midwife will try to find out whether you re in  labor or whether you re just having contractions.She may watch you for a few hours. The following tests may be done:    Pelvic exam to see if your cervix has effaced (thinned) and dilated (opened)    Uterine activity monitoring to detect contractions    Fetal monitoring to check the health of your baby    Ultrasound to check your baby s size and position    Caring for Yourself At Home  If you have contractions  but your cervix is still thick and closed, the midwife may ask you to do the following at home:    Drink plenty of water.    Do fewer activities.    Rest in bed on your side.    Avoid intercourse and nipple stimulation.    When to Call Your Midwife    Five or more contractions per hour    Bag of water breaks    Bleeding or spotting     If You Need Hospital Care   labor often requires that you have hospital care and complete bed rest. You may have an IV (intravenous) line to get fluids. And you may be given pills or  an injection to help prevent contractions. Finally, you may receive medication (corticosteroids) that helps your baby s lungs mature more quickly.    Are You At Risk?  Any pregnant woman can have  labor. It may start for no reason. But these risk factors can increase your chances:    Past  labor or past early birth    Smoking and drug or alcohol use during pregnancy    Multiple fetuses (twins or more)    Problems with the shape of the uterus    Bleeding during the pregnancy    The Dangers of  Birth  A baby born too soon may have health problems. This is because the baby didn t have enough time to mature. The baby then is at risk of:    Not breastfeeding well    Having immature lungs    Bleeding in the brain    Dying    Reaching Term  Your goal is to get as close to term as you can before giving birth. The closer you get to term, the higher your chance of having a healthy baby. Work with your healthcare provider. Together, you can take steps that may keep you from giving birth too early.        Testing for Gestational Diabetes in Pregnancy  [facility] are committed to providing safe care during your pregnancy. We follow the recommendations of the American Diabetes Association and the American College of Obstetricians and Gynecologists to test all pregnant women for gestational diabetes. Testing early in pregnancy (if you have risk factors) and testing all women between 26-28 weeks follows local and national guidelines for care during pregnancy. Clients who feel that they cannot consent to such testing, may choose to transfer their care to our consultant obstetricians.  What is the test?  Eat normally on the day of the test; a diet rich in protein, whole grains, and vegetables would be best. Avoid simple sugars, white flour products and juices prior to testing.  You will be asked to drink a 10 oz glucose beverage (50 gm, about the same as a can of root beer).  The product is not  carbonated as it has to be consumed within 5 minutes. During the next hour, you are seen for a prenatal visit, but are asked to limit your activity around the clinic. Feel free to bring a book or your computer.   Any level less than 140 for this  glucose challenge test  is considered normal. If measured at 140 to 185, the diagnostic test, a  3 hour glucose tolerance test  will be conducted. If 2 or more readings are abnormal, the diagnosis of gestational diabetes is confirmed and a referral to a diabetes educator will be made. If the level is 185 or above, this confirms the diagnosis and a referral will be made without administering the 3 hour test.  A fasting blood sugar may be performed sometime in the first trimester if you have risk factors for the development of gestational diabetes such as a previous diagnosis or birth of a large baby or a close family relative with diabetes.  What is gestational diabetes?  Your body converts what you eat into glucose. In response to rising blood sugar levels it secretes insulin in order to be able to utilize that glucose. During pregnancy as the placenta grows and matures, it secretes hormones that are necessary to assure baby s growth and development, however, they also reduce the action of insulin in the mother. In some cases too much insulin is blocked (this is called  insulin resistance ) and blood sugar in the mother rises above a normal level. Pregnant women who have never had diabetes before but who have high blood sugar (glucose) levels during pregnancy are said to have gestational diabetes. Gestational diabetes affects about 4-7% of all pregnancies.  What if I have gestational diabetes?  If either of the tests for gestational diabetes show that you have this condition, a referral will be made to visit with the diabetes educator. The educator will help you to make wise food choices, count carbohydrates, monitor your blood sugar and record your levels in a journal. We  ask that you bring this diary with you at each prenatal visit. You may meet with the educator several times during the remainder of your pregnancy.  How gestational diabetes can affect your baby  Some mothers may wonder why testing for GDM is delayed until the early part of the 3rd trimester for most women. Gestational diabetes has an impact on the baby at the time of rapid body growth. When the mother s blood has elevated sugar levels, extra glucose crosses the placenta causing the baby to have excess weight gain ( macrosomia ). Some babies are too big to be born vaginally so their mother must have  births. Babies of mothers with uncontrolled gestational diabetes may have difficult births that can cause trauma to the mother and in rare circumstances, babies may suffer fractures or oxygen deprivation during birth. They may have difficulty maintaining their own blood sugar after birth and they may also have trouble adapting to life outside. Recent research indicates that babies of mothers with uncontrolled or undiagnosed gestational diabetes are at risk for obesity and type 2 diabetes. Women who develop gestational diabetes are more likely to develop type 2 diabetes within 15 years after their pregnancy.  Additional Information  The American College of Nurse Midwives (ACNM) provides an information sheet describing diabetes screening in pregnancy: http://www.womensdocs.com/rolando/pdf/Second_Trimester/Gestational_Diabetes.pdf    You can visit the American Diabetes Association website http://www.diabetes.org for additional information and to purchase their book,  Gestational Diabetes: What to Expect .    A brochure from the American College of Obstetrics and Gynecology is available at:   http://www.acog.org/~/media/For%20Patients/qme859.pdf?dmc=1&vx=[email or web address]  Testing for gestational diabetes is a critical part of your prenatal journey. Thank you for taking good care of yourself and your baby.    HEALTHY  PREGNANCY CARE: 22-26 WEEKS PREGNANT    You are finishing your second trimester. Your baby is developing rapidly. At this stage, babies have a sense of balance, can respond to touch, and are recognizing parent voices.  Your baby will be moving around more now.  You may notice [place]-Harris contractions now, which are painless and prepare the uterus for the delivery.    Nutrition: During this time, you may find that your nausea and fatigue are gone. Overall, you may feel better and have more energy than you did in your first trimester. Be sure you are getting enough calcium and iron in your diet. Your prenatal vitamins cannot supply all of the nutrients you need, so continue to eat 3-4 servings of dairy foods and 2-3 servings of meat/fish/poultry/nuts every day. Foods high in iron include: red meats, eggs, dark green vegetables, dark yellow vegetables, nuts, kidney beans and chickpeas. Some cereals are fortified with iron, so look at the food labels for 100% of the daily requirement for iron.     Discuss your work situation with your midwife or physician as needed. If you stand for long periods of time, you may need to make changes and take breaks.    [place] for childbirth and parenting classes, including an infant CPR class. Breastfeeding classes are recommended too.    Childbirth and Parenting Education:   [place] parenting center: http://TrustDegreesEastern Plumas District HospitalMengcao/   (504) 706-BABY  [place]: (education, yoga & wellness) www.8fit - Fitness for the rest of us  Enlightened Mama: www.[place] 100eenedLeap In Entertainment.LoanTek   Childbirth collective: (Parent topic nights)  www.childbirthcollective.org/  Hypnobabies:  www.hypnobabiestwincities.com/  Hypnobirthing:  Http://hypnobirthing.com/    Book Recommendations:   Jasmine Jordy's Birthing From Within--first few chapters include a new-age tone, you may prefer to skip it and keep going, because there is good stuff later.  This book recommendation covers emotional preparation, but does cover coping with pain, and use of  "both pharmacological and nonpharmacological methods.    Dr. Hickman' The Pregnancy Book and The Birth Book--the pregnancy book goes month-by month    Womanly Art of Breastfeeding by La Leche League International   Bestfeeding by Nisha Song--great pictures    Mothering Your Nursing Toddler, by Holly Arias.   Addresses dealing with so many of the challenging behaviors of a nursing toddler.  How Weaning Happens, by La Leche League.  Discusses weaning at all ages, from medically necessary weaning of an infant, all the way up to age 5 (or older), with why/why not, and strategies.  Very empowering book both for deciding to wean and deciding not to.    American College of Nurse-Midwives (ACNM) http://www.midwife.org/; look at the informational handouts at http://www.midwife.org/Share-With-Women     www.mymidwife.org    Mother to Baby (Medication and Herbal guidance in pregnancy): http://www.mothertobaby.org  Toll-Free Hotline: 954.582.6883  LactMed (Medication use while breastfeeding): http://toxnet.nlm.nih.gov/newtoxnet/lactmed.htm    Women's Health.gov:  http://www.womenshealth.gov/a-z-topics/index.html    American pregnancy association - http://americanpregnancy.org    Centering Pregnancy (group prenatal care option): http://centeringhealthcare.org    Information about doulas:  Childbirth collective: http://www.childbirthcollective.org/  Doulas of [place] (MARINA):  www.marina.org  [facility]  project: http://twincitiesdoulaproject.com/     Early Childhood and Family Education (ECFE):  ECFE offers parents hands-on learning experiences that will nourish a lifetime of teachable moments.  http://ecfe.info/ecfe-home/    March of Dimes www.[place]     FDA - Nutrition  www.mypyramid.gov  Under \"For Consumers,\" click on \"pregnant and breastfeeding women.\"      Centers for Disease Control and Prevention (CDC) - Vaccines : http://www.cdc.gov/vaccines/       When researching information on the web, " question the validity of websites.  The Mobile Tracing Services .gov, .edu and.org tend to be more reliable information.  If there are a lot of advertisements, be cautious of the information provided. Stay away from blogs and chat rooms please!    How can you care for yourself at home?   You can refer to the Starting Out Right book or find it online at http://www.[facility]altheast.org/images/stories/maternity/[facility]-Starting-Out-Right.pdf or http://www.healtheast.org/images/stories/flipbooks/[facility]-starting-out-right/[facility]-starting-out-right.html#p=8     You can sign up for a weekly parenting e-mail that gives support, tips and advice from health care professionals that starts with pregnancy and continues through the toddler years. To register, go to www.Curvo.org/baby at any time during your pregnancy.      Baby Feeding in the Hospital: Information, Support and Resources    As you prepare for the birth of your child, you will want to consider options for feeding your baby including breast-feeding and/or baby formula. The American Academy of Pediatrics recommends exclusive breast-feeding for the first six months (although any amount of breast-feeding is beneficial).  However, we also understand that breast-feeding is a personal choice and not for everyone. Whether or not you choose to breast-feed, your decision will be respected by our staff.    There are numerous benefits of breast-feeding; here are a few to consider:    Provides antibodies to protect your baby from infections and diseases    The cost: formula can cost over $1,500 per year    Convenience, no warming up or sterilizing bottles and supplies    The physical contact with breastfeeding can make babies feel secure, warm and comforted    What ever my feeding choice, what can I expect after I deliver my baby?    Your baby will usually be placed skin-to-skin immediately following birth. The skin to skin contact between you and your baby will be a special and  memorable time. The bonding and attachment comforts your baby and has a positive effect on baby s brain development.     Having your baby  room in  with you also helps you start to learn your baby s body rhythms and sleep cycle.      You will also begin to learn your baby s cues (signals) that he or she is ready to feed.    When do I start to feed my baby?  As soon as possible after your baby s birth, you will be encouraged to begin feeding.  In the first couple of weeks, your baby will eat often.  Breastfeeding babies usually eat at least 8 times in 24 hours.  Babies fed formula usually eat at least 7 times in 24 hours.      Breast-feeding tips:    Get comfortable and use pillows for support.    Have your baby at the level of your breast, facing you,  tummy to tummy .      Touch your nipple to your baby s lips so you baby s mouth opens wide (rooting reflex).  Aim the nipple toward the roof of your baby s mouth. When your baby opens his or her mouth, pull your baby toward your breast to help your baby  latch on  to your nipple and much of the areola area.    Hand expressing your breast milk can assist with latching your baby to your breast, if needed.    Ask for help, breastfeeding may seem awkward or uncomfortable at first, this is normal. There are numerous resources available at [facility] and beyond.     If your goal is to exclusively breastfeed, avoid using any formula or artificial nipples (including bottles and pacifiers) while you are your baby are learning to breastfeed unless there is a medical reason.       Mixing breastfeeding and formula can interfere with how you begin building your milk supply.  It can impact how you and your baby  learn  to breastfeeding together and alter the natural growth of  good  bacteria in your baby s stomach.    Delay a pacifier or a bottle in the first few weeks until breastfeeding is well established. This is often around 3 weeks of age.    Ask your nurse  to show you how to hand express.   Breast milk can be kept in the refrigerator or freezer for later use.        Touring the Maternity Care Center  To schedule a tour at either [place] or [facility], please do so online using the following links:  [facility] - https://www.[employer].Milk A Deal/registerlist.asp?s=6&m=303&vs=5&p=2&kuwed=157&ps=1&group=37&it=1&kbd=154  [facility] - https://www.FitOrbit/registerlist.asp?s=6&m=303&vs=5&p=2&iumnz=214&ps=1&group=38&it=1&iqv=059    Hospital and Clinic  Resources:  -Schedule an appointment with a [facility] CNM who is also a Lactation Consultant by calling [phone]     -Schedule a clinic appointment with a [facility] CNM with dedicated clinic hours for breastfeeding assistance by calling [phone]. Breastfeeding clinic visits are at [facility] on Mondays, [facility] on Tuesdays and [facility] on Thursdays.     Baby Café    Pregnant and interested in breastfeeding?  Need answers to breastfeeding questions?  Want to help breastfeeding moms?  Already breastfeeding and want to meet other moms?    Join us at the Baby Café!    Baby Cafe is a free, drop-in service offering breast-feeding support for pregnant women, breast-feeding mothers and their families.  Come share tips and socialize with other mothers.  Babies and siblings are welcome (no childcare available).    Starting April 2018, Baby Café will be at 4 locations.  Please see below for the Baby Café closest to you!      [facility]  [address] [place] [address]  1st Wednesday: 10am-12pm    [facility]  [address] [place] [address]  3rd Wednesday 4-6pm    [facility]  [address] [place] [address]  4th Wednesday 10am-12:30pm    [facility]  [address] [place] [address]  4th Wednesdays: 4-6pm      Hmong, Maltese, and Grenadian which is may be available at some sites.    For more information, please  contact: [email or web address] or [phone]    -Attend a baby weigh in at [facility].  Lactation consultants are available to answer questions  [place]: Tuesdays 1:00 - 2:00  [facility]: Mondays 1:00 - 2:00  www.[facility] HospitalGreen & Pleasant.[employer]In[employer]    -Attend one of the New Mama groups at [facility] in [facility].  [facility] also offers one-on-one in home and in office lactation consults.   www.[facility].[employer]In[employer]    -Attend a Kellie Skaggs meeting.  Multiple groups in several locations throughout the [facility]. The meetings are no-cost and always informative breastfeeding education session through [employer] La Leche League  Www.scott.org/  Medication use while breastfeeding: http://toxnet.nlm.nih.gov/newtoxnet/lactmed.htm     Online Resources:    [facility].org/baby sign up for free online weekly e-mail    [facility]theast.org/maternity    Breastfeedingmadesimple.com    ClinicalBox.[employer] (La Leche League)    Normalfed.com    Womenshealth.gov/breastfeeding    Workandpump.com    Breast-feeding Supplies & Pumps:  Talk to your insurance provider or WIC (Women, Infants and Children) to learn more about options available to you. Recent health insurance changes may include additional coverage for supplies and pumps.    Public Health:  Women, Infants and Children Nutrition program (WIC): provides breast-feeding support and education in addition to formal feeding moms. 970-ZEM-1565 or http://www.health.[facility].us/divs/fh/wic    Family Health Home Visiting: Public [facility] Nurse home visits are available. Talk to your provider to see if you qualify. Most counties have a program available.    Additional Resources:  La Leche League is an international, nonprofit, nonsectarian organization offering information, education, and support to mothers who want to breast-feed their babies. Local groups offer phone help and monthly meetings. Visit idiag.org or [place].org and us the  Find local  support  drop down menu or click on the  Resources  tab.    [place] Breastfeeding Resources: 6-519-678-BABY (2702) toll free    National Breastfeeding Help Line trained breastfeeding peer counselors can help answer common breast-feeding questions by phone. Monday-Friday: English/Yakut  [phone]- [phone] toll free, 1-417.310.7431 (TTY)    [facility] Connection: 154-234-[facility] (7684)

## 2021-06-08 NOTE — PATIENT INSTRUCTIONS - HE
[facility] Nurse Midwives - Contact information:  Appointment line and to get a hold of CNM in clinic Monday-Friday 8 am - 5 pm:  [phone].  There are some clinics with early start times (1st appointment 7:40 am) and others with evening hours (last appointment 6:20 pm).  Most are typically open from 8 am to 5 pm.    CNM on call answering service: [phone].  Specify your hospital of choice and leave a brief message for CNM;  will then page CNM who is on call at your specified hospital and you should receive a call back with 15 minutes.  Be sure that your ringer is audible and that you can accept blocked calls so that we can get back in touch with you! This number should be reserved for urgent needs if during the day, before 8 am, after 5 pm, weekends, holidays.    Contact the on-call CNM with warning signs, such as:    vaginal bleeding     Vaginal discharge and itching or pain and burning during urination    Leg/calf pain or swelling on one side    severe abdominal pain    nausea and vomiting more than 4-5 times a day, or if you are unable to keep anything down    fever more than 100.4 degrees F.        You are invited to  Meet the [facility] Nurse-Midwives  A way to tour the hospital Labor and Delivery unit and meet the midwives that attend births since you may not have the opportunity to meet them during your prenatal care.  Some sessions are informal meet and greet type social hours, others address a specific concern or topic.    Tuesday, Februrary 12, 2019 7-8pm  [facility]    Wednesday, April 17, 2019 7-8pm  [facility], [facility]    Thursday, August 15, 2019 7-8pm  [facility]    Wednesday November 13, 2019 7-8 pm  [facility], [facility]    Please call [phone] to register          Touring the Maternity Care Center  To schedule a tour at either [facility] or [facility], please do so online using  "the following links:  [facility] - [email or web address]&[email or web address]&[email or web address]&[email or web address]&it=[email or web address]&iec=376  [facility] - [email or web address]://[email or web address].[employer][email or web address]?[email or web address]&[email or web address]&[email or web address]&[email or web address]&[email or web address]&ps=1&group=38&it=1&czg=939      Pre-registration for Hospital Stay:  Sometime betweeen 30-37 weeks, it is recommended that you \"pre-register\" for your upcoming hospital stay on our website:    [email or web address]    Breastfeeding and Birth Control  How do I decide what birth control method is best for me while I am breastfeeding?  Choosing a method of birth control is very personal. First, answer the following questions:      Do you want to have more children?    How much spacing between births do you want for your children?    Do you smoke or have you had any health problems, such as liver disease or a blood clot?  Talk about the answers to each of these questions with your health care provider to help you choose the best method for you.    Can I use breastfeeding as my birth control?  Using breastfeeding as your birth control (the lactational amenorrhea method) can be a good way to keep from getting pregnant in the first months after the baby is born. Each time your baby nurses, your body releases a hormone called prolactin, which stops your body from making the hormones that cause you to ovulate (release an egg). If you are not ovulating, you cannot get pregnant.    The lactational amenorrhea method works only if:    you have not started your period yet.    you are breastfeeding only and not giving your baby any other food or drink.    you are breastfeeding at least every 4 hours during the day and every 6 hours at night.    your baby is less than 6 months old.  When any 1 of these 4 things is not happening, you no longer have good protection from getting pregnant, and you should use another form of birth control.    What birth control " methods are safe for me to use while I breastfeed?    Methods without hormones  Methods without hormones do not affect you, your baby, or your breastfeeding.  Methods without hormones that are the most effective    The copper intrauterine contraceptive device (IUD) (ParaGard) is a small, T-shaped device that is in- serted into your uterus (womb) through the vagina and cervix. The copper IUD lasts for 10 years.    Sterilization (getting your tubes tied or your partner having a vasectomy) is very effective, but it is per- manent. You should choose sterilization only if you do not want to have more children.  A method without hormones that is effective    The lactational amenorrhea method described above is effective for the first 6 months.  Methods without hormones that are less effective    Natural family planning is monitoring your body for signs of ovulation and not having sex when you think you are ovulating. This method is reliable only if you are having regular periods every month.    Barrier methods (condoms, diaphragms, sponges, and spermicides) are used at the time you have sex. These methods are effective only if you use them correctly every time.    Methods with hormones  Birth control methods that use hormones can be used while you are breastfeeding. They may have a small effect on lowering the amount of milk you make. All hormones will get into your breast milk in very small amounts, but there is no known harm to your baby from this small amount of hormone in breast milk.only methodsmethods use only 1 hormone, called progestin. You can start them right after your baby is born or wait 4 to 6 weeks to make sure your milk supply is good.     Progestin-only pills ( minipills ): If you like to take pills every day, you can use the minipill. In order forpill to work well, you have to take 1 at the same time each day. When you stop breastfeeding, you should start pills that have both estrogen and progestin  because they are better at keeping you from get- ting pregnant.     Progestin IUD (Mirena): The progestin IUD is shaped and inserted into the uterus like the copper IUD. It works for up to 5 years. Both IUDs are usually inserted 4 to 6 weeks after the baby is born.    Progestin implant (Nexplanon): The progestin implant is a small matchstick-sized flexible alvino. It is placed into the fatty tissue in the back of your arm. It works for up to 3 years.    Progestin shot (Depo-Provera): The progestin shot is given every 3 months.    estrogen and progestin methods  These methods use 2 hormones, called estrogen and progestin.     These methods increase your risk of a blood clot, which is already higher than normal after you have a baby. You should not use them until your baby is at least 6 weeks old. The combined methods are not recommended as the first choice for women who are breastfeeding. If a combined method is the one that you feel will be best for you to prevent getting pregnant, these methods are okay to use while breastfeeding.     Combined birth control pills: You take a pill each day.    Vaginal ring (NuvaRing): The ring is worn in the vagina for 3 weeks then left out for 1 week before youin a new ring.    Patch (Ortho Evra): The patch is placed on your skin and changed every week for 3 weeks then left off forweek before putting a new patch on a different area of your skin.    Pediatric Care Providers at [facility]:    Choosing the right provider is one of the most important decisions you ll make about your health care. We can help you find the right one. Remember, you re looking for a provider you can trust and work with to improve your health and well-being, so take time to think about what you need. Depending on how complicated your health care needs are, you may need to see more than one type of provider.    Primary Care Providers: You ll see a primary care provider first for most health issues. They ll work  with you to get your recommended screenings, help you manage chronic conditions, and refer you to other types of providers if you need them. Your primary care provider may be called a family physician or doctor, internist, general practitioner, nurse practitioner, or physician s assistant. Your child or teenager s provider may be called a pediatrician.  Specialists: You ll see a specialist for certain services or to treat specific conditions. Specialists include cardiologists, oncologists, psychologists, allergists, podiatrists, and orthopedists. You may need a referral from your primary care provider before you go to a specialist in order for your health plan to pay for your visit.\pardHere are some tips for finding a provider where you live:  If you already have a provider you like and want to keep working with, call their office and ask if they accept your coverage.  Call your insurance company or state Medicaid and CHIP program. Look at their website or check your member handbook to find providers in your network who take your health coverage.  Ask your friends or family if they have providers they like and use these tools to compare health care providers in your area.    Family Medicine at [facility]: https://www.[facility].org/clinics/family-medicine.html    Many of our families enjoy all seeing the same doctor, who comes to know the whole family very well. We base our practice on the knowledge of the patient in the context of family and community.  WHY CHOOSE A FAMILY MEDICINE PHYSICIAN?  Ability of the whole family to see the same doctor  Focus on the whole person, including physical and emotional health  A personal relationship with their doctor that is nurtured over time  Respect for individual and family beliefs and values  No need to change primary care providers when a certain age is reached  Coordination of care when other health care services are needed    Pediatrics at [facility]:    Https://www.[facility].org/clinics/pediatrics.html    Through a teaching affiliation with the [facility], [facility] staff keeps current on new developments in the field of pediatrics.     Everything we do centers around caring for children. We place special emphasis on wellness and prevention.pediatric care team includes a team of pediatricians and certified nurse practitioners who provide care to pediatric and adolescent patients ages 0 to 18, and some up to the age of 26. We offer preventive health maintenance for healthy children as well the diagnosis and treatment of common and chronic illnesses and injuries. In addition, we also offer several pediatric specialists who focus on adolescent health issues and developmental and behavioral issues.    Circumcision    Educational video:     [email or web address]    What is circumcision?  At birth, baby boys have loose skin that covers the head of the penis. This skin is called the foreskin. When all or part of the foreskin of the penis is cut off, this is called circumcision.  Why is circumcision done?  Circumcision is done for many reasons including Restoration, cultural, looks, and health. Some Restoration groups circumcise all boys as a amy-based practice. Many people in the [place] choose to circumcise their baby boys because they believe it is culturally normal. It is not a common practice in [place], [place], or [place].  Some parents choose circumcision so that their son will have a penis that looks like his father s if the father was also circumcised. Other people choose circumcision because they believe it is  or will protect the boy or man from infection or cancer later in life.  Does circumcision protect against infection or cancer?  Circumcision does seem to protect against some types of infection or cancer. Cancer of the penis is one type of cancer that circumcision may  prevent. However, cancer of the penis is very rare. One hundred thousand circumcisions would need to be done to prevent one case of cancer of the penis. Circumcision may also decrease the chance of some sexually transmitted infections, such as HIV and human papilloma virus (HPV). See the next page for more information on the risks and benefits of circumcision.  What happens during a circumcision?  Babies born in the hospital are usually circumcised before they go home. Health care providers also perform circumcisions in their offices and clinics within a few weeks after birth.  Yazidi circumcisions are most often done at home or in a [facility].  Before the circumcision is performed, some providers give an injection (shot) of a small amount of anesthetic (numbing medicine) at the base of the penis to block the pain or put an anesthetic cream on the penis to numb the area that will be cut.  There are 2 different ways to do a circumcision. In one type, a clamp placed around the head of the penis cuts off the blood supply to the foreskin, and the foreskin above the clamp is cut off. The clamp is left on the penis until the area heals and it falls off a few days later. In another type of circumcision, the foreskin is cut off with scissors or a scalpel.  After the circumcision, petroleum jelly and sometimes gauze may be put over the area of the penis where the skin was removed. This protects the end of the penis while it heals.  Can I keep my son s penis  if it is circumcised?  Regular washing with soap and water will keep any penis clean. Circumcision does not make the penis . Uncircumcised boys do need to be taught to clean beneath their foreskin, just like they need to be taught to wash their hands or brush their teeth.  How do I decide if I should have my son circumcised?  The American Academy of Pediatrics (AAP) says that  circumcision may have health benefits. They do not recommend  circumcision for all boys as a routine procedure. The AAP recommends that you talk to your health care provider to decide if circumcision is the right choice for your family. You may also wish to discuss the question with your family or .  What are the risks and benefits of circumcision?  We do not have a lot of good scientific information about the health risks or benefits of circumcision.  Possible Risks:  Very few baby boys (less than 1 in 100) will have a problem after circumcision, such as bleeding or mild infection of the penis. These problems are usually not serious and are easy to treat.  Less common problems are:    Removal of too much or too little foreskin  Some rare problems are:    Narrowing of the opening of the penis, which can cause problems with urination    Removal of part of the penis or death of some of the other skin on the penis   Infection that spreads to other parts of the body  People used to think babies did not really feel pain. Now we know that they do. Many baby boys appear to feel a lot of pain during circumcision if anesthesia is not used.  We do not know if circumcision affects sexual function or sensation.  Possible Benefits:    Less risk for some kinds of cancers, like cancer of the penis    Fewer urinary tract (bladder or kidney) infections for babies    Less risk for some sexually transmitted infections, such as HIV, herpes, and HPV     May protect female sexual partners from some sexually transmitted infections    For More Information  American Academy of Family Physicians: Circumcision  http://familydoctor.org/familydoctor/en/pregnancy-newborns/caring-for-newborns/infant- care/circumcision.html  MedlinePlus: Circumcision (includes a slide show on the procedure)  www.nlm.nih.gov/medlineplus/circumcision.html  American Academy of Pediatrics: Policy statement on circumcision  Http://pediatrics.aappublications.org/content/130/3/e756.abstract      Childbirth and  Parenting Education:   [facility] parenting center: http://[facility].[employer]/   [phone]  [place]: (education, yoga & wellness) www.Lover.ly.[employer]  Enlightened Mama: www.enlightenedmama.[employer]   Childbirth collective: (Parent topic nights)  www.childbirthcollective.org/  Hypnobabies:  www.hypnobabiest"[employer]".com/  Hypnobirthing:  Http://hypnobirthing.com/    Book Recommendations:   Jasmine Jordy's Birthing From Within--first few chapters include a new-age tone, you may prefer to skip it and keep going, because there is good stuff later.  This book recommendation covers emotional preparation, but does cover coping with pain, and use of both pharmacological and nonpharmacological methods.    Dr. Hickman' The Pregnancy Book and The Birth Book--the pregnancy book goes month-by month    Womanly Art of Breastfeeding by La Leche League International   Bestfeeding by Nisha Song--great pictures    Mothering Your Nursing Toddler, by Holly Arias.   Addresses dealing with so many of the challenging behaviors of a nursing toddler.  How Weaning Happens, by La Leche League.  Discusses weaning at all ages, from medically necessary weaning of an infant, all the way up to age 5 (or older), with why/why not, and strategies.  Very empowering book both for deciding to wean and deciding not to.    American College of Nurse-Midwives (ACNM) http://www.midwife.org/; look at the informational handouts at http://www.midwife.org/Share-With-Women     www.mymidwife.org    Mother to Baby (Medication and Herbal guidance in pregnancy): http://www.mothertobaby.org  Toll-Free Hotline: 525.687.9633  LactMed (Medication use while breastfeeding): http://toxnet.nlm.nih.gov/newtoxnet/lactmed.htm    Women's Health.gov:  http://www.womenshealth.gov/a-z-topics/index.html    American pregnancy association - http://americanpregnancy.org    Centering Pregnancy (group prenatal care option): http://centeringhealthcare.org    Information about  "doulas:  Childbirth collective: http://www.childbirthcollective.org/  Doulas of [place] (MARINA):  www.marina.org  [facility]  project: http://twincitiesdoulaproject.com/     Early Childhood and Family Education (ECFE):  ECFE offers parents hands-on learning experiences that will nourish a lifetime of teachable moments.  http://ecfe.info/ecfe-home/    March of Dimes www.AMEC     FDA - Nutrition  www.mypyramid.gov  Under \"For Consumers,\" click on \"pregnant and breastfeeding women.\"      Centers for Disease Control and Prevention (CDC) - Vaccines : http://www.cdc.gov/vaccines/       When researching information on the web, question the validity of websites.  The [employer] .gov, .D1G and.org tend to be more reliable information.  If there are a lot of advertisements, be cautious of the information provided. Stay away from blogs and chat rooms please!             Virtual Breastfeeding Support:    During this time of isolation, breastfeeding families need even more community!  Here are some area organizations offering virtual support groups for breastfeeding:      [facility] [employer] Support Group, Tuesdays at 10:30 am   Run by CATALINA Hernandez of [employer]   Go to [employer] Facebook page and click on \"events\" for link   https://www.[employer].com/events/[email or web address]/    [facility] Milk Hour, Thursdays at 2:30 pm    Run by CATALINA Perez   Go to [facility] FB page and send message to get link   https://www.[employer].com/healthfoundations/    [facility] holding virtual meetings the first Tuesday of each month, 8-9 pm, and the   Third Saturday, 10 - 11 am.  Go to [facility] [place] FB page; message to get link [email or web address]/?hc_[email or web address]=[facility]    [place] offers a Lactation Lounge every Friday 12pm - " 1pm, run by Gene Gibson Leader   Sign up via link at HelpMeRent.com/cbe-lactation   https://www.HelpMeRent.com/cbe-lactation    [facility] is offering virtual support groups every Monday, 10:30 am - 12 pm, run by nurse CATALINA   Https://www.[employer].[email or web address]/events/[email or web address]/    Prenatal Breastfeeding Classes:        [place] is offering virtual breastfeeding and  care classes:  https://www.HelpMeRent.com/education-workshops    B[employer] childbirth and breastfeeding education offering virtual prenatal breastfeeding classes  https://www.birthedmn.com/workshops

## 2021-06-08 NOTE — PROGRESS NOTES
"Shelly Salmeron is a 29 y.o. female who is being evaluated via a billable telephone visit.      The patient has been notified of following:     \"This telephone visit will be conducted via a call between you and your physician/provider. We have found that certain health care needs can be provided without the need for a physical exam.  This service lets us provide the care you need with a short phone conversation.  If a prescription is necessary we can send it directly to your pharmacy.  If lab work is needed we can place an order for that and you can then stop by our lab to have the test done at a later time.    Telephone visits are billed at different rates depending on your insurance coverage. During this emergency period, for some insurers they may be billed the same as an in-person visit.  Please reach out to your insurance provider with any questions.    If during the course of the call the physician/provider feels a telephone visit is not appropriate, you will not be charged for this service.\"    Patient has given verbal consent to a Telephone visit? Yes    What phone number would you like to be contacted at? [phone]    Patient would like to receive their AVS by AVS Preference: Chuck.    Additional provider notes:     This CNM called Shelly at 24w4d for a routine prenatal appointment by phone. She reports she is feeling well and has no concerns. Normal fetal movement. Denies bleeding, LOF, or cramping.     Reviewed total weight gain of 13 lb (5.897 kg). Within range for expected total weight gain of 25 lb (11.5 kg)-35 lb (16 kg)    COVID-19 Mitigation: Shelly is washing her hands frequently, cleaning surfaces at home and limiting social contact to avoid exposure from coronavirus. She denies fever, cough, shortness of breath, any contact with anyone with coronavirus-like symptoms, travel to high risk areas. She is currently furloughed; probably will go back to work in June, feels ok about this. " Reviewed recommendation for iron supplementation for all pregnant women at this time, she is currently supplementing daily but will decrase to 3x weekly. We discussed current recommendations for prenatal appointments with some inperson visits and telephone visits when appropriate. Reviewed how to reach CNM with non-urgent and urgent concerns between visits.     Itching: had a video visit with dermatology. Shelly now feels like her itching may be an allergic reaction to chocolate. Symptoms have resolved with elimination of chocolate from her diet. Will keep the prescription from derm in case eczema worsens.  Pregnancy checklist updated regarding. Reviewed tdap, rpr, GCT, and repeat hgb at next visit-- accepts all. Will work on finding an online option for CBE in the next couple of weeks-- resources reviewed. Warning signs reviewed. RTC in 4 weeks, sooner if problems.    YULIET Dejesus, CNM    Phone call duration: 12 minutes ([id number])

## 2021-06-08 NOTE — PROGRESS NOTES
Shelly is here for routine prenatal visit at 27 weeks and 4 days.  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  She is requiring regular fetal movements.  Denies PTL symptoms.  Sleeping fairly well though it's been harder this weekend with protests in her neighborhood.  Noting some leaking of what she thinks is urine over the last several weeks, usually with sitting or with physical activity.  Desires referral for physical therapy to focus on pelvic strength to [facility] on [facility].  Referral entered as requested.  Reviewed  labor warning signs and when to call.     Completing GCT, hemoglobin, RPR today.  Accepts Tdap vaccine.

## 2021-06-08 NOTE — PROGRESS NOTES
"Shelly Salmeron is a 29 y.o. female who is being evaluated via a billable telephone visit.      The patient has been notified of following:     \"This telephone visit will be conducted via a call between you and your physician/provider. We have found that certain health care needs can be provided without the need for a physical exam.  This service lets us provide the care you need with a short phone conversation.  If a prescription is necessary we can send it directly to your pharmacy.  If lab work is needed we can place an order for that and you can then stop by our lab to have the test done at a later time.    Telephone visits are billed at different rates depending on your insurance coverage. During this emergency period, for some insurers they may be billed the same as an in-person visit.  Please reach out to your insurance provider with any questions.    If during the course of the call the physician/provider feels a telephone visit is not appropriate, you will not be charged for this service.\"    Patient has given verbal consent to a Telephone visit? Yes    What phone number would you like to be contacted at? [phone]    Patient would like to receive their AVS by AVS Preference: Chuck.    Additional provider notes: Shelly was contacted by phone for her routine prenatal appt at 29w4d weeks gestation. Reviewed lab results: no follow up indicated. Questions about work and self isolation recommendations before delivery answered. Feeling baby move, no cramping, change in vaginal discharge or concerns about anything. Advised to make appt in 2 weeks. Reviewed recommendation for daily iron supplementation for all pregnant women at this time, she is taking every other day and will continue.  We discussed current recommendations for prenatal appointments with some inperson visits and telephone visits when appropriate. Reviewed changes to hospital policies regarding COVID-19 and the maternity unit. Reviewed how to " reach CNM with non-urgent and urgent concerns between visits.      Phone call duration: 12 minutes  Lotus Guillen DNP,APRN,CNM    Sherif Peña, CMA

## 2021-06-09 NOTE — PROGRESS NOTES
Shelly is here today by herself for her routine prenatal appt at 31w4d gestation. Feeling baby move, no cramping, change in vaginal discharge or concerns about anything. Advised to make appt in 2 weeks. Reviewed recommendation for daily iron supplementation for all pregnant women at this time, she is currently supplementing once daily and will continue; We discussed current recommendations for prenatal appointments with some inperson visits and telephone visits when appropriate. Reviewed changes to hospital policies regarding COVID-19 and the maternity unit. Reviewed how to reach CNM with non-urgent and urgent concerns between visits.

## 2021-06-09 NOTE — PROGRESS NOTES
"Shelly Salmeron is a 29 y.o. female who is being evaluated via a billable video visit.      The patient has been notified of following:     \"This video visit will be conducted via a call between you and your physician/provider. We have found that certain health care needs can be provided without the need for an in-person physical exam.  This service lets us provide the care you need with a video conversation.  If a prescription is necessary we can send it directly to your pharmacy.  If lab work is needed we can place an order for that and you can then stop by our lab to have the test done at a later time.    Video visits are billed at different rates depending on your insurance coverage. Please reach out to your insurance provider with any questions.    If during the course of the call the physician/provider feels a video visit is not appropriate, you will not be charged for this service.\"    Patient has given verbal consent to a Video visit? Yes  How would you like to obtain your AVS? AVS Preference: [email or web address].  Patient would like the video invitation sent by: Text to cell phone: [phone]  Will anyone else be joining your video visit? No        Video Start Time: 9:40AM    Additional provider notes:   Video visit completed with patient at 33 weeks and 4 days.  Questions answered about what to pack and what to have on hand postpartum.  CBE completed through [facility],.  Plans pediatric care through [facility].  Will purchase a breast pump and be reimbursed through her insurance.   Continues to have some SI tailbone discomfort.  Did 1 PT visit but did not find it super effective.  Plans return to work next week and will monitor her symptoms closely and consider additional physical therapy support if needed.  Other comfort measures reviewed including massage, chiropractic, abdominal binder.   Reports regular fetal movements.   Denies signs or symptoms of PTL.  EPDS equals 2, 0 to #10.  Reviewed normal 28 week labs. "   Pt is taking iron supplementation.  Plan GBS and Hgb at 36 weeks.     Pt aware next visit will be an in-person visit.   Reviewed warning signs and how and when to contact the on-call CNM.     YULIET Guardado CNM, [facility]  Midwifery    Video-Visit Details    Type of service:  Video Visit    Video End Time (time video stopped): 9:57 AM  Originating Location (pt. Location): Home    Distant Location (provider location):  [facility]     Platform used for Video Visit: [facility]      YULIET Veras CNM

## 2021-06-09 NOTE — PATIENT INSTRUCTIONS - HE
[facility] Nurse Midwives - Contact information:  Appointment line and to get a hold of CNM in clinic Monday-Friday 8 am - 5 pm:  [phone].  There are some clinics with early start times (1st appointment 7:40 am) and others with evening hours (last appointment 6:20 pm).  Most are typically open from 8 am to 5 pm.    CNM on call answering service: [phone].  Specify your hospital of choice and leave a brief message for CNM;  will then page CNM who is on call at your specified hospital and you should receive a call back with 15 minutes.  Be sure that your ringer is audible and that you can accept blocked calls so that we can get back in touch with you! This number should be reserved for urgent needs if during the day, before 8 am, after 5 pm, weekends, holidays.    Contact the on-call CNM with warning signs, such as:    vaginal bleeding     Vaginal discharge and itching or pain and burning during urination    Leg/calf pain or swelling on one side    severe abdominal pain    nausea and vomiting more than 4-5 times a day, or if you are unable to keep anything down    fever more than 100.4 degrees F.          You are invited to  Meet the [facility] Nurse Midwives [facility]    A way to tour the hospital Labor and Delivery unit and meet the midwives in our group was postponed at the start of hospital restrictions following COVID-19. We will resume these when able and virtual options may be available in the future.     Please call [phone] for ongoing updates.        Touring the Maternity Care Center  To schedule a tour at either [place] or [facility], please do so online using the following links:  [facility] - [email or web address]  [facility] - [email or web address]      Car Seat  Clinics:  https://dps.[place].gov/divisions/ots/child-passenger-safety/Pages/car-seat-checks.aspx  [facility]    Free Car Seat Distribution Facilities     By Appt. Address Contact Information (For appointment)      \Yes [employer]\[address]\[place],\cell Aileen Vazquez)[email or web address]      Yes [facility]\ [address],\cell Massiel Robles)[email or web address]      Yes [facility]\[address]\[place],\cell Jackeline Tobar)[phone]\[email or web address]      Immunizations:  http://www.cdc.gov/vaccines/schedules/easy-to-read/child.html      Postpartum Depression  The first weeks of caring for a  baby are more than a full-time job. Although it is often a happy time, your feelings and moods may not be what you expected. Many women experience  baby blues.  Here are some tips to help you understand when feelings of sadness are normal, and when you should call your health care provider.    What are the baby blues?  As many as 3 of every 4 women will have short periods of mood swings, crying, or feeling cranky or restless during the first weeks after birth. These feelings can be worse when you are tired or anxious. Women who have the baby blues often say they feel like crying but don t know why. Baby blues usually happen in the first or second week postpartum (after you give birth) and last less than a week. If you are not sleeping, becoming more upset, don t feel like you can take care of your baby, or your sadness lasts 2 weeks or more, call your health care provider.    What is postpartum depression?  About one in every 5 women will develop depression during the first few months postpartum that may be mild, moderate, or severe. Women who have postpartum depression may have some of these symptoms:    Feeling guilty     Not able to enjoy your baby and feeling like you are not bonding with  your baby      Not able to sleep, even when the baby is sleeping    Sleeping too much and feeling too tired to get out of bed    Feeling overwhelmed and not able to do what you need to during the day    Not able to concentrate    Don t feel like eating    Feeling like you are not normal or not yourself anymore    Not able to make decisions    Feeling like a failure as a mother    Feeling lonely or all alone    Thinking your baby might be better off without you  If you have any of these symptoms, call your health care provider!    Which symptoms of postpartum depression are dangerous?  Sometimes a woman with postpartum depression will have thoughts of harming herself or her baby. If you find yourself thinking about hurting yourself or your baby, call your health care provider immediately.    MOTHERHOOD: THE EARLY DAYS  You prepare for the birth of your baby for many months during pregnancy, and then the first months at home after your baby is born can be a quiet, gentle time of getting to know this new person who has come to live in your home. But for most women it is not all quiet or sweet. And for some women it is a very hard time.  What Can I Expect in the First Few Months After the Baby Comes?  New mothers and their families face many challenges in the first few months:    Your body and your hormones have to get back to normal.    You and the baby will be learning to breastfeed.    Babies only sleep a few hours at a time. The entire family will have a hard time getting enough sleep.    You and your family need to learn how to parent this new family member.    If you have a partner, you have to figure out how to stay together as a couple and maybe even start to have sex again.    You may have to figure out how to keep from getting pregnant again right away.    You may need to return to work and find day care.    How Long Will it Take for My Body to Get Back to Normal?  Some changes will occur quickly. Others will  not occur as quickly.    Your uterus, cervix, and vagina will all shrink to their nonpregnant size in about 2 weeks. Your vagina may be tender and dry for a few months--especially if you are breastfeeding.    If you had stitches or hemorrhoids, your   bottom   will be sore for 2 weeks or more.    For some women who have problems urinating, it can take several months for you to be able to hold your urine when you cough or sneeze or suddenly  something heavy.    Your breast milk will   come in   2 to 3 days after the birth of your baby. It will take 6 to 8 weeks for you and the baby to get the hang of breastfeeding and find a pattern. During these first weeks, you can have engorged breasts at times and often leak milk.    Your stomach and intestines all have to fall back into place. You may have a lot of gas for a few weeks.    You may be constipated--especially if you are breastfeeding.    Your stretched stomach muscles can recover in a few weeks, but for some women it takes longer--6 months or a year--to recover.    If you had a  delivery, you may have pain or numbness around the incision for 6 months or more.    Losing the weight you gained during pregnancy will probably take 6 months to a year. Have patience! It took 40 weeks to get here. Give yourself 40 weeks to get back.    What Can I Expect When My Hormones Change?  About 75% of all women will get the   blues.   This usually starts about 3 days after the birth of your baby. You may cry easily and feel very, very tired. A few women become very depressed. If you had a  delivery or your new baby was sick, you are at a higher risk for depression.  Call your health care provider right away if you cannot care for yourself or your baby, if you feel very nervous or worried, if you cannot stop crying, or if you are having thoughts of hurting yourself or your baby.    Taking Care of Yourself  While you are still pregnant:    Talk with your partner  and your family about the time ahead. Arrange for someone to help you during the first weeks at home if you can.    Talk with your health care provider about birth control options and make a plan before the baby comes.    If you are worried about how to parent a , take parenting classes. You will learn a lot about how babies act and you will make some friends who are going through the same thing at the same time. Most [facility] have these classes.    Arrange for someone to help with baby care if you can.  After the baby comes:    Ask for help. Let other people do the cooking and cleaning and run the house. Focus on yourself and your baby.    Sleep whenever you can. Try not to be tempted to   get some things done   when the baby sleeps. This is your time to sleep, too.    Drink lots of water. You will need at least 6 big glasses of water everyday to avoid constipation and make enough breast milk. Every time you sit down to breastfeed, have a big glass of water with you to drink while you are nursing.    Eat lots of vegetables and fruit. You will need lots of vitamins and fiber to help your body get back to normal. This will also help you avoid constipation.    Go outside and walk. Babies can go outside even if it is very cold. Fresh air and sunshine will do you both good.    Take sitz baths. Put about 6 inches of warm water in your bathtub and sit in there for 15 minutes 2 to 3 times a day. This will help your   bottom   heal more quickly. It will also give you 15 minutes of private time!    Talk to other mothers. Join a new parents group. Call Rosi and go to [facility] meetings if you are breastfeeding.     With your partner:    Keep talking. Share the experience.    Spend time alone. Even a 30-minute walk can be a date.    Start a birth control method. You can get pregnant before you even have a period. It is very important to use birth control if you do not want to get pregnant again right  away.    When you have sex, use a lubricant. A lot of lubricant! Take it slow.  The first few months after a baby comes can be a lot like floating in a jar of honey--very sweet and khalil, but very sticky, too. Take time to enjoy the good parts. Remind yourself that this time will pass. Bon voyage!    FOR MORE INFORMATION  For questions about depression during and after pregnancy:  http://www.womenshealth.gov/publications/our-publications/fact-sheet/depression-pregnancy.html   After birth: The first 6 weeks:  http://www.ShiftPlanning/Post-Birth-and-Recovery   Breastfeeding resources:  http://www.prettysecrets.org/health-info/getting-breastfeeding-off-to-a-good-start/    HEALTHY PREGNANCY CARE: 37 to 41 WEEKS PREGNANT    Talk with your midwife or physician about when to call with signs of labor    Regular uterine contractions that are getting closer together and/or stronger    If you think your water has broken or is leaking    Bleeding from the vagina like a period (bloody vaginal discharge is normal)    If you are not feeling your baby move    Make plans for transportation and  as needed for when you are going to the hospital.    Your midwife or physician may offer to check your cervix for changes.     Ask your health care provider about vaccinations you may need following delivery. By now, you should have received a Tdap immunization to protect against pertussis or whooping cough. Fathers and family members who will be in close contact with the baby should also receive a Tdap shot at least two weeks before the expected birth of the baby if they have not had a Td (tetanus) shot for at least two years.    If you are past your due date, discuss the next steps leading to delivery with your midwife or physician. If you don't start labor on your own by 41 or 42 weeks, your midwife or physician may recommend giving you medicines to ripen your cervix and start labor.    Preparing for your baby: Tell  your midwife or physician how you plan to feed your baby (breast or bottle), who you have chosen to do pediatric care for your baby, and if you have a boy, whether you have chosen to have him circumcised. You will need a car seat correctly installed in your vehicle to bring your baby home. As you start to set up the nursery at home for your baby, make sure the crib is safe. The mattress needs to fit snugly against the edges of the crib. If you can fit a soda can between the bars, they are too far apart and can allow the baby's head to caught between them.    Learn about infant care and feeding, including information about infant CPR. We recommend that you put your baby to sleep on his or her back to reduce the chance of Sudden Infant Death Syndrome (SIDS). To maintain a healthy environment in which your child can grow, it's best to keep your home smoke-free. By preparing ahead, your transition into parenthood will go smoothly for you and your baby.    Your midwife or physician will want to see you for a checkup 2 to 6 weeks after delivery.    If you have questions about any symptoms you are experiencing or any other concerns, call your provider or their clinic staff at [facility] at Phone: [phone]. If it's after clinic hours, physician patients should call the [facility] at [phone] ([phone]); midwife patients should call their answering service at [phone].    How can you care for yourself at home?   You can refer to the Starting Out Right book or find it online at http://www.[facility].org/images/stories/maternity/[facility]-Starting-Out-Right.pdf or http://www.[facility].org/images/stories/flipbooks/[facility]-starting-out-right/[facility]-starting-out-right.html#p=8     You can sign up for a weekly parenting e-mail that gives support, tips and advice from health care professionals that starts with pregnancy and continues through the toddler years. To register, go to  [email or web address] at any time during your pregnancy.        Making Plans for Feeding My Baby    By this point, you probably have read a lot about feeding your baby.  Breastfeed or formula? Each mother s decision is her own and [facility] respects you and your choices. We ve gathered information on both breastfeeding and formula feeding to help with your decision. Talking with your physician or nurse-midwife can also help in your decision.  However you plan to feed your baby, [facility] encourage rooming in with your baby, skin-to-skin contact and feeding your baby based on his or her cues.    Skin-to-skin contact  Being close to mom helps your baby adjust to life outside of the womb.  It helps your baby regulate their body temperature, heart rate, and breathing.  Your baby will usually be placed skin-to-skin immediately following birth or as soon as possible, if medical intervention is needed.    Rooming-In  Having your baby stay with you in your room is called  rooming-in .  Keeping your baby in your room helps you to learn how to care for your baby by getting to know your baby s cues, body rhythms and sleep cycle.       Cue-based feeding  Cues (signals) are baby s way of telling you what he or she wants.  When you learn your infant s cues, you know how to care for and feed your baby.   Feeding cues are the licking and smacking of lips, bringing their fist to their mouth, and a reflex called  rooting - where baby turns and opens his or her mouth, searching for the breast or bottle.  Crying is a late feeding cue.  Babies can feed frequently, often at least 8 times in 24 hours.  Breastfeeding facts  Breast milk is the best source of nutrition for your baby and is available at birth. In the first couple of days, your milk volume is already starting to increase, though it may not be noticeable. Breastfeed frequently to increase your milk supply. Within three to five days, you will begin to  notice larger milk volumes. An increase in breast size, heaviness and firmness are often described as the milk  coming in.  Frequent breastfeeding can help breasts from getting overly firm and painful. You will know the baby is getting enough milk if your baby is having wet and dirty diapers and gaining weight.     If your goal is to exclusively breastfeed, it is important to not use any formula or artificial nipples (including bottles and pacifiers) while your baby is learning to breastfeed.  While it may seem like an  easy  option to give your baby a bottle, formula should only be given if there is a medical reason for your baby to have it.    Positioning and attachment   Get comfortable.  Use pillows as needed to support your arms and baby.  Hold baby close at the level of your breast, facing you in a tummy to tummy position.  Skin to skin helps with this.  Position the baby with his or her nose by the nipple.  There should be a straight line from baby s ear to shoulder to hips.  Tickle your baby s lips or wait for baby to open mouth wide, bring baby to breast by leading with the chin.  Aim the nipple at the roof of baby s mouth.  A rapid sucking pattern is followed by longer, drawing pattern with occasional swallows heard.  When baby is correctly latched, your nipple and much of the areola are pulled well into baby s mouth.      Returning to work or school  Focus on a good start to breastfeeding.  Many women continue to provide breastmilk for their baby when they return to work or school.  Making plans about where to pump and store milk can make the transition go well.  Talk with other mothers who have also returned to work or school for tips and support.  Your employer s Human Resource department may be a resource as well.     Returning to work or school: (continued)     babies can mean fewer  sick  days for you.    A quality breast pump will also save time and add comfort.  Check with your insurance  prior to giving birth for breast pump coverage.  Many insurance companies include a pump within your benefits.    Wait until your baby is at least three weeks old to introduce a bottle for the first time.  Have someone besides you give the bottle.    Breastfeed when you are with your baby. Reserve your bottles of breast milk for when you are away.     Your breasts will need to be  emptied  either by your baby or a pump.  Plan to pump at least twice in an eight hour day.    If you cannot pump at work, continue breastfeeding at home. Any amount of breast milk is worth giving to your baby.    Formula feeding facts  If you are planning to use formula to feed your baby, you will want to make some preparations ahead of time. Talk to your doctor or nurse-midwife about what type of formula to use. Some are iron-fortified, meaning they have extra iron in them. You will want to purchase formula and bottles before your baby is born to be sure you are ready after you return from the hospital. The [facility] do not provide formula samples to take home.    Be sure to follow formula mixing directions closely. Regular milk in the dairy case at the grocery store should not be given to babies under 1 year old. Baby formula is sold in several forms including:    Ready-to-use. This is the most expensive, but no mixing is necessary.    Concentrated liquid. This is less expensive than ready-to-use and you mix with water.    Powder. This is the least expensive. You mix one level scoop of powdered formula with two ounces of water and stir well.    Most babies need 2.5 ounces of formula per pound of body weight each day. This means an 8-pound baby may drink about 20 ounces of formula a day; however, this is just an estimate. The most important thing is to pay attention to your baby s cues.  If your baby is always fussy, needs more iron or has certain food allergies, your physician may suggest you change your baby s formula to a  different kind.     How do I warm my baby s bottles?  You may feed your baby a bottle without warming it first. It is OK for the breast milk or formula to be cool or room temperature. If your baby seems to prefer it warmed, you can put the filled bottle in a container of warm water and let it stand for a few minutes. Check the temperature of the liquid on your skin before feeding it to your baby; to be sure it isn t too hot. Do not heat bottles in the microwave. Microwaves heat food and liquids unevenly, and this can cause hot spots that can burn your baby.    How do I clean and sterilize bottles?  Sterilize bottles and nipples before you use them for the first time. You can do this by putting them in boiling water for 5 minutes. After that first time, you can wash them in hot and soapy water. Rinse them carefully to be sure there is no soap left on them. You can also wash them in the .    [facility] [phone] ([phone])    Share with Women\Narciso I in Labor?  What is labor? Labor is the work that your body does to birth your baby. Your uterus (the womb) contracts(tightens). The contractions(labor pains) push your baby down onto your cervix(the opening ofyour uterus). Thispressure causesyour cervix to open. When your cervix iscompletely open (10 centimeters dilated), you will push your baby through your vagina and out into the world.  What do contractions feel like? When contractionsfirst start, theyusually feellike cramps duringyour period. Sometimesyoufeelpain in your back. Mostoften,contractions feel like muscles pulling painfully in your lower belly. At first, the contractions will probably be 15 to 20 minutes apart.They maybe irregular and will not feel too painful. As labor goes on, the contractionsget stronger,closer together, more consistent, and more painful.  How do I time the contractions? When the contractionsseem to be coming regularly, youshould start to time them.You time your  contractions by counting the number of minutes from the start of one contraction to the start of the next contraction.  What should I do during early labor when the contractions start? If it is night andyoucan sleep, do so. If it happensduring the day, there are some things you can do to take care of yourself at home: Walk. If the painsyou are having are reallabor, walking will makethecontractionscome closer together and they will be stronger,but you will be able to cope with them better if you are standing or moving around. If the contractions are early labor ones that come andgo (sometimes called false labor), walkingcan make them go away. Take a shower or bath. This will help you relax. Eat. Labor is a big event.Your body needs a lot of energy to be effective.Eat whatever you feel like eating. Drink water. Not drinking enough water can cause contractions to not be as effectiveas theyshould be.You need to be well hydrated (drinking enoughwater) to help your body work well during labor. Take a na p. If youfeel tired, lay down on your side and get all the rest you can. It helps to be rested when you go intoactive labor. Do something you enjoy. Spend time with family. Watch a movie. Distraction will help you relax. Get a massage. If your labor is in your back, a strong massage on your lower back may feel very good. Getting a foot massage or having a partner rub your feet can also be very relaxing. Don t panic. You can do this. Your body was made for this. You are strong!  When should I call my health care provider if I think I am in labor? Your contractions have been 5 minutes or less apart for at least an hour. Your contractions are becoming so painful youcannot walk or talk during one. You think your amniotic sac (bag of galo) breaks. You may have a big gush of amniotic fluid (water) or just fluid that runs down your legs when you walk or move or change position.  Are there other reasons to call my health care  provider? If you are concerned about anything, don t hesitate to call your health care provider.You should definitely call your health care provider or go to the hospital if:  It is 3 weeks or more before your due date, and you are having contractions.  You have vaginal bleeding that is more than your period, soaks your underwear, or runs down your legs.  You have sudden severe pain that does not go away with rest.  Your baby has not movedfor several hours.  You are leaking greenish fluid.    For More Information: http://onlinelibrary.alarcon.com/doi/10.1111/jmwh.88120/epdf      Department of Health and Human Services: Signs oflabor,labor stages, and types of birth  http://womenshealth.gov/pregnancy/childbirth-beyond/labor-birth.html#a      Childbirth and Parenting Education:   [facility] center: http://GoodBelly/   (980) 300-BABY  [place]: (education, yoga & wellness) www.OneTouchEMR  Enlightened Mama: www.enlightenedmama.Me!Box Media   Childbirth collective: (Parent topic nights)  www.childbirthcollective.org/  Hypnobabies:  www.hypnobabiestreQall.Me!Box Media/  Hypnobirthing:  Http://hypnobirthing.Me!Box Media/  The Birth Hour: https://theGlobecon Group Holdings/online-childbirth-class/    APPS and Podcasts:   Ovia  Glow Nurture  The Birth Hour (for birth stories)   Birthful   Expectful   The Longest Shortest Time  PregnancyPodcprince Henderson    Book Recommendations:   Jasmine Stuyvesant's Birthing From Within--first few chapters include a new-age tone, you may prefer to skip it and keep going, because there is good stuff later.  This book recommendation covers emotional preparation, but does cover coping with pain, and use of both pharmacological and nonpharmacological methods.    Dr. Hickman' The Pregnancy Book and The Birth Book--the pregnancy book goes month-by month    Womanly Art of Breastfeeding by La Leche League International   Bestfeeding by Nisha Song--great pictures    Mothering Your Nursing Toddler, by Holly Burnham  "Hugo.   Addresses dealing with so many of the challenging behaviors of a nursing toddler.  How Weaning Happens, by La Leche League.  Discusses weaning at all ages, from medically necessary weaning of an infant, all the way up to age 5 (or older), with why/why not, and strategies.  Very empowering book both for deciding to wean and deciding not to.    American College of Nurse-Midwives (ACNM) http://www.midwife.org/; look at the informational handouts at http://www.midwife.org/Share-With-Women     www.mymidwife.org    Mother to Baby (Medication and Herbal guidance in pregnancy): http://www.mothertobaby.org  Toll-Free Hotline: 123.821.8665  LactMed (Medication use while breastfeeding): http://toxnet.nlm.nih.gov/newtoxnet/lactmed.htm    Women's Health.gov:  http://www.womenshealth.gov/a-z-topics/index.html    American pregnancy association - http://americanpregnancy.org    Centering Pregnancy (group prenatal care option): http://centeringhealthcare.org    Information about doulas:  Childbirth collective: http://www.childbirthcollective.org/  Doulas of [place] (HAI):  www.hai.org  [facility]  project: http://twincitiesdoulaproject.com/     Early Childhood and Family Education (ECFE):  ECFE offers parents hands-on learning experiences that will nourish a lifetime of teachable moments.  http://ecfe.info/ecfe-home/    March of Dimes www.marchofdiValon Lasers.com     FDA - Nutrition  www.mypyramid.gov  Under \"For Consumers,\" click on \"pregnant and breastfeeding women.\"      Centers for Disease Control and Prevention (CDC) - Vaccines : http://www.cdc.gov/vaccines/       When researching information on the web, question the validity of websites.  The domains .gov, .edu and.org tend to be more reliable information.  If there are a lot of advertisements, be cautious of the information provided. Stay away from blogs and chat rooms please!       Virtual Breastfeeding Support:    During this time of isolation, " "breastfeeding families need even more community!  Here are some area organizations offering virtual support groups for breastfeeding:      [employer] Support Group,  at 10:30 am   Run by CATALINA Hernandez of [employer] [facility]   Go to [employer] [facility] Facebook page and click on \"events\" for link   https://www.Hats Off Technology.com/events/267636921123796/    [facility] Milk Hour,  at 2:30 pm    Run by CATALINA Perez   Go to [facility] FB page and send message to get link   https://www.Hats Off Technology.TopShelf Clothes/PushButton Labsundations/    [facility] holding virtual meetings the first Tuesday of each month, 8-9 pm, and the   Third Saturday, 10 - 11 am.  Go to [facility] FB page; message to get link https://www.Hats Off Technology.TopShelf Clothes/LLLofGoldHailey/?hc_location=[facility]    [employer] offers a Lactation Lounge every Friday 12pm - 1pm, run by [facility] [employer] [place] [facility] Leader   Sign up via link at Simple Beat/cbe-lactation   https://www.Simple Beat/cbe-lactation    [facility] is offering virtual support groups every Monday, 10:30 am - 12 pm, run by nurse IBCLC   Https://www.facebook.com/events/456137388563506/    Prenatal Breastfeeding Classes:        [employer] is offering virtual breastfeeding and  care classes:  https://www.Simple Beat/education-workshops    BirthEd childbirth and breastfeeding education offering virtual prenatal breastfeeding classes  https://www.birthedmn.com/workshops      "

## 2021-06-10 NOTE — ANESTHESIA POSTPROCEDURE EVALUATION
Patient: Shelly Salmeron  * No procedures listed *  Anesthesia type: epidural    Patient location: Labor and Delivery  Last vitals: No vitals data found for the desired time range.    Post vital signs: stable  Level of consciousness: awake and responds to simple questions  Post-anesthesia pain: pain controlled  Post-anesthesia nausea and vomiting: no  Pulmonary: unassisted, return to baseline  Cardiovascular: stable and blood pressure at baseline  Hydration: adequate  Anesthetic events: no    QCDR Measures:  ASA# 11 - Sol-op Cardiac Arrest: ASA11B - Patient did NOT experience unanticipated cardiac arrest  ASA# 12 - Sol-op Mortality Rate: ASA12B - Patient did NOT die  ASA# 13 - PACU Re-Intubation Rate: NA - No ETT / LMA used for case  ASA# 10 - Composite Anes Safety: ASA10A - No serious adverse event    Additional Notes:

## 2021-06-10 NOTE — PROGRESS NOTES
"OUTPATIENT TRIAGE NOTE:    Patient Name:  Shelly Salmeron  :      1990  MRN:      [id number]      ASSESSMENT:   @ 39w2d here for evaluation of labor.   Arrhythmia - recently noted, 20 cleared for birth by cardiology  GBS positive  COVID swab done by RN, result pending    PLAN:   - Discussed risks and benefits of continued observation verses going home to await active labor- Shelly and her  Oriental orthodox discussed options on own and voice preference to go home at this time.  Discharge to home undelivered; encouraged to rest. Given an additional 100 mg Vistaril PO before leaving, with 100mg dose of Vistaril sent home and instructed to take in the morning or tomorrow evening, as needed.  - Reviewed warning signs including decreased fetal movement, leaking of fluid, vaginal bleeding, or signs of labor. Reviewed how and when to contact on-call CNM, including coming back in for evaluation when contractions intensify. Follow-up in clinic with CNM as scheduled or sooner as needed. All questions answered. Verbalized understanding of above, and agreement with plan.     SUBJECTIVE:  Shelly Salmeron is a 29 y.o.  at 39.2 weeks, with an EDC of 2020 who presented to [facility] for evaluation of labor. Denies leaking of fluid, bleeding, or changes in fetal movement.  She experienced irregular contractions during the night of 20.  Awoke to regular contractions this morning, 20.  Presented for evaluation of labor around 1430 and has labored in the bed and in the tub since arrival.  An IV was placed for hydration. Two 50 mg doses of Vistaril were administered.    OBJECTIVE:  Vital signs: /68   Pulse 91   Temp 98.7  F (37.1  C) (Oral)   Resp 16   Ht 5' 2.5\" (1.588 m)   Wt 158 lb 12.8 oz (72 kg)   LMP 2019 (Exact Date)   BMI 28.58 kg/m    FHR: Baseline 130 bpm, positive accels, negative decels, moderate variability  Uterine contractions: q2-4 min, duration 60-90 sec, " mild/moderate, soft resting tone    Physical Exam: breathing well, eyes closed during contractions, coping  Abdomen: SIUP, ANGELA by [facility] and bedside ultrasound, abdomen non-tender  SVEs:   - 1510: 1/80%//posterior/soft (performed by JERRI DINH)  - 1830: 2.5/80%/-2/posterior/soft (performed by RN)  - 2130: 2/80%/-2/posterior/soft/vertex, Edge score: 7  Sterile speculum exam: n/a  Legs: negative edema, moves all freely    Temp:  [98.4  F (36.9  C)-98.7  F (37.1  C)] 98.7  F (37.1  C)  Heart Rate:  [90-91] 91  Resp:  [16] 16  BP: (113-114)/(59-68) 114/68  No intake or output data in the 24 hours ending 08/22/20 5813    Results:  - Asymptomatic COVID swab collected by RN, results pending    Patient was seen with student, JUJU Dewey who was present for learning. I personally assessed, examined and made clinical decisions reflected in the documentation. YULIET Triana CNM    Provider: YULIET Rosario CNM, CLC    TT with patient 40 min >50% time spent on counseling and coordination of care.

## 2021-06-10 NOTE — PLAN OF CARE
Patient presents to [facility] to rule out labor. Pt states has been contractions off and on since yesterday.pt was able to sleep last night but pain has been increasing. Rates pain a 7 out of 10.  39w2d   Allergies: Chocolate  Patient reports GbS +         FH Tracing A  Monitor Mode : External  Baseline Range/Rate: 135  Multiple birth?: No  Contraction  Monitor Mode: Palpation  Contraction Frequency (min): 2-4  Resting Tone Palpated: Soft  Quality: Moderate  CNM Susan  informed of above and orders received.  Plan includes; monitoring rest and repeat exam in one hour. Patient informed and agrees with plan.

## 2021-06-10 NOTE — ANESTHESIA PREPROCEDURE EVALUATION
Anesthesia Evaluation      Patient summary reviewed   No history of anesthetic complications     Airway   Mallampati: II  Neck ROM: full   Pulmonary - negative ROS and normal exam                          Cardiovascular - negative ROS and normal exam  Exercise tolerance: > or = 4 METS   Neuro/Psych - negative ROS     Endo/Other    (+) pregnant     GI/Hepatic/Renal - negative ROS      Other findings: Was evaluated by cardiology for her abnormal EKG, consensus was that it was safe for her to labor and that she did not need to be seen again by cardiology after delivery.      Dental - normal exam                        Anesthesia Plan  Planned anesthetic: epidural    ASA 2     Anesthetic plan and risks discussed with: patient and spouse    Post-op plan: routine recovery

## 2021-06-10 NOTE — PROGRESS NOTES
"Outpatient/Triage Note:    Patient Name:  Shelly Salmeron  :      1990  MRN:      [id number]    Assessment:   @ 39w2d  Early vs prodromal labor, no cervical change  Reactive NST  GBS positive    Plan:   - Fluid bolus +  mg vistaril. Revaluate in 2-3 hours for cervical change, consider admission for labor vs therapeutic rest vs discharge home.    -Report given to Lydia Mackey CNM, by phone. She will follow up with RN to determine plan of care.    Subjective  Shelly Salmeron is a 29 y.o.  who presented to [facility] for evaluation of contractions. Starting around 1:00 in the afternoon she reports contractions increased to about 3-5 minutes and increased in intensity. She is breathing well through contractions but is finding early labor challenging. Denies leaking of fluid, bleeding, or changes in fetal movement. JODIE Vaz, at bedside, supportive.    Objective  /59 (Patient Position: Semi-salas)   Pulse 90   Temp (P) 98.7  F (37.1  C) (Oral)   Resp 16   Ht 5' 2.5\" (1.588 m)   Wt 158 lb 12.8 oz (72 kg)   LMP 2019 (Exact Date)   BMI 28.58 kg/m      Physical Exam:  General appearance:  laboring  Psych: AAO x3  Skin: Pink, warm & dry  HEENT: unremarkable  Cardiovascular:  Well-perfused on room air  Respiratory:  Unlabored respirations  Abdomen: soft, NT, gravid  Leopolds:         EFW:<4500 grams (AGA)     FHR:Baseline: 130 bpm, Variability: Moderate (6 - 25 bpm), Accelerations: present and Decelerations: Absent    Uterine contractions:[place] Frequency: Every 2-4 minutes, Duration:  seconds    SVE: 1.5/60/-2/post/med. Unchanged after 1.5 hours      Total time spent with patient: 25 minutes, >50% spent on counseling and coordination of care.    Provider:  YULIET Haas CNM    Date:  2020  Time:  5:03 PM              "

## 2021-06-10 NOTE — PROGRESS NOTES
Shelly is here by herself today  for her routine prenatal appt at 35w4d gestation.  Reviewed GBS to be done next visit. Vertex by Leopold's. Feeling baby move, BH contractions, no change in vaginal discharge or concerns about anything. Advised to make appt in 1 weeks. Reviewed recommendation for daily iron supplementation for all pregnant women at this time, she is currently supplementing once daily and will continue;  We discussed current recommendations for prenatal appointments. Reviewed changes to hospital policies regarding COVID-19 and the maternity unit. Reviewed how to reach CNM with non-urgent and urgent concerns between visits.

## 2021-06-10 NOTE — PROGRESS NOTES
Shelly is here by herself for a routine prenatal visit at 37w4d.  She has no concerns and is feeling well.  Fetal movement is normal. Interval weight gain is approriate. Patient is experiencing Normal. TdaP 6/1/20. Pt does have a car seat.    Group B strep results reviewed. Group B strep was positive and she is aware that she will be getting IV antibiotics in active labor or SROM. Fetal maturity and expectations for fetal movement in the third trimester, how and when to do fetal kick counts and when to call the CNM and contact information reviewed.Reviewed recommendation for daily iron supplementation for all pregnant women at this time, she is currently supplementing once daily and will continue; given list of options for supplementation  and comfort measures to prevent constipation. We discussed current recommendations for prenatal appointments with some inperson visits and telephone visits when appropriate. Reviewed changes to hospital policies regarding COVID-19 and the maternity unit. Reviewed how to reach CNM with non-urgent and urgent concerns between visits.

## 2021-06-10 NOTE — PROGRESS NOTES
"S: Shelly presents to clinic today alone for a routine prenatal at 36-4 weeks gestation. She reports positive fetal movement. Denies leaking of fluids/vaginal bleeding. Reports increased vaginal discharge- is clear. Discussed what membranes rupturing may look/feel like on pad- denies this feeling. Denies sustained contractions. Has been noticing [place].  Reports she feels well emotionally. Being back at work is going well- mitigating covid restrictions has been a little stressful but it feels good to be back. Has been having increased SI pain but feels like she is utilizing her own PT knowledge and her colleagues to help cope.      O:/66 (Patient Site: Right Arm, Patient Position: Sitting, Cuff Size: Adult Regular)   Pulse 80   Ht 5' 2.5\" (1.588 m)   Wt 156 lb (70.8 kg)   LMP 11/21/2019 (Exact Date)   BMI 28.08 kg/m      A: Fundal height appropriate for gestational age. Fetal heart tones within normal limits. Weight gain appropriate. Vertex presentation (verified with bedside US).     P: Hgb and GBS collected today. Talked about signs of labor and when/how to call the on-call midwife. Return to clinic in 1 week.      JUJU Noguera    Patient was seen with student who was present for learning.  I personally assessed, examined and made clinical decisions reflected in the documentation.     Lauren Souza, YULIET, CNM, IBCLC      "

## 2021-06-10 NOTE — TELEPHONE ENCOUNTER
Shelly paged the on-call midwife with questions about early labor. Reports she started arabella last night, but contractions were mild enough to sleep through overnight. This morning about 8-12 minutes apart, lasting 60-90 seconds. Baby is active. Lost her mucous plug-- largely mucous, a little bit of blood. Denies LOF. Reviewed r/b/a of coming in for assessment vs staying at home. Shelly feels comfortable staying home. Will work on hydration, small meals, shower / bath and position changes for comfort, rest. Reviewed early labor may start and stop, or progress into more intense / regular contractions. Offered for CNM to call her back this afternoon to check in-- she prefers to call again if contractions are more intense or if she has concerns. Warning signs reviewed. No further questions or concerns.    YULIET Dejesus, CNM

## 2021-06-10 NOTE — ANESTHESIA PROCEDURE NOTES
Epidural Block    Patient location during procedure: OB  Time Called: 8/23/2020 3:47 AM  Reason for Block:labor epidural  Staffing:  Performing  Anesthesiologist: Saulo Cavanaugh MD  Preanesthetic Checklist  Completed: patient identified, risks, benefits, and alternatives discussed, timeout performed, consent obtained, airway assessed, oxygen available, suction available, emergency drugs available and hand hygiene performed  Procedure  Patient position: right lateral decubitus  Prep: ChloraPrep  Patient monitoring: continuous pulse oximetry, heart rate and blood pressure  Approach: midline  Location: L3-L4  Epidural technique: SONIA to local.  Number of Attempts:1  Needle  Needle type: Joby   Needle gauge: 18 G     Catheter in Space: 5  Assessment  Sensory level:  No complicationsSensory level: not assessed at this time.       Additional Notes:  Was in middle of placing LE in room 28 when called for this one.

## 2021-06-10 NOTE — PROGRESS NOTES
Patient overall doing well - having some discomfort on her left side,but this is where her deeper incision was along with the lysis of adhesions.  She eating and drinking fine and having no issues with her bowels or bladder.  Will connect with her daughter on when to get her in with Nell for her post op appt.     Shelly presents to the clinic alone.  Late term pregnancy fetal surveillance, elective IOL planned IOL at 41 weeks.  Thus far, patient desires expectant management.  Baby is active, and she denies regular uterine contractions, loss of fluid or vaginal bleeding.  Plan SVE next visit.  GBS POSITIVE and 36-week hemoglobin 12.8 g/dL.  Maternal heart rate is regularly irregular apparently skipping a beat about every 6-7 beats.  She denies shortness of breath, chest pain or heart palpitations.  I consulted with cardiologist Dr. Barajas who recommends a 12-lead EKG with a rhythm strip tomorrow.  Ordered.  Term labor precautions and danger signs and symptoms reviewed.  All questions answered.  Encouraged daily fetal movement counting and to call or return to clinic with any questions, concerns, or as needed.

## 2021-06-11 NOTE — PATIENT INSTRUCTIONS - HE
OUTPATIENT LACTATION RESOURCES    -Schedule an appointment with a Lactation Consultant by calling [phone]    -Attend a baby weigh in at [facility].  Lactation consultants are available to answer questions   www.[employer] Jielai Communication    -[facility]     [phone]   1st Wed: [employer]   3rd Wed: [employer]   4th Wed: [employer] [facility]   4th Thurs: [employer]   Hmong, Jocelyne, Slovak and Vincentian languages may be available at some sites     -[facility] offers one-on-one in home and in office lactation consults.   www.[email or web address]    -Mama's Milk Hour: [facility], Thursdays from 2:30-3:30pm    -Attend a LeLeche League meeting.  Multiple groups in several locations throughout the [facility].  Phone consultations also available.   www.londas.org    [phone]    -If you qualify, you can connect with a lactation consultant through WIC    -[facility] offers a drop in breastfeeding support group   [facility], Wednesdays 1:30p-3p   [phone]

## 2021-06-22 NOTE — PROGRESS NOTES
Assessment/ Plan:     1.  Well female exam with routine gynecological exam  Healthy female exam completed today. Labs / screening tests completed today. Discussed lifestyle modifications such as a balanced diet and regular exercise. I discussed self breast exams. I recommend that she start screening mammography at age 34. Her mom was diagnosed with breast cancer at age 45. Follow up recommended in 1 year or sooner as needed.     2. Screening for cervical cancer  - Gynecologic Cytology (PAP Smear)    3. Vaginal discharge  Vaginal discharge appears more than physiologic. Recommend wet prep. This did com back positive for yeast.   - Wet Prep, Vaginal    4. Yeast infection of the vagina  Pt will be treated for a yeast infection with 1 tablet of diflucan. She can repeat this in one week if symptoms persist. Risks and benefits discussed.   - fluconazole (DIFLUCAN) 150 MG tablet; Take 1 tablet (150 mg total) by mouth once for 1 dose May repeat in one week if symptoms persist..  Dispense: 2 tablet; Refill: 0    5. Family history of malignant neoplasm of breast  Family history of breast cancer. Mom diagnosed at 45. No gene testing was done. I discussed starting mammos at age 34. If she wishes to see genetics she will let me know.    Healthy female exam.       Subjective:      Shelly Salmeron is a 28 y.o. female who presents for an annual exam. The patient is not sexually active. The patient participates in regular exercise: yes. The patient reports that there is not domestic violence in her life. No other questions or concerns today. Will up date PAP smear. She would also like to test for yeast.     Healthy Habits:   Regular Exercise: Yes  Sunscreen Use: No  Healthy Diet: Yes  Dental Visits Regularly: Yes  Seat Belt: Yes  Sexually active: Yes  Self Breast Exam Monthly:No  Hemoccults: No  Flex Sig: No  Colonoscopy: No  Lipid Profile: Yes  Glucose Screen: Yes  Prevention of Osteoporosis: No  Last Dexa: N/A  Guns at Home:   No        There is no immunization history on file for this patient.  Immunization status: up to date and documented.    No exam data present    Gynecologic History  Patient's last menstrual period was 11/18/2018.  Contraception: condoms  Last Pap: 2014. Results were: normal  Last mammogram: n/a. Results were: n/a      OB History   No data available       No current outpatient medications on file.     No current facility-administered medications for this visit.      No past medical history on file.  No past surgical history on file.  Patient has no known allergies.  No family history on file.  Social History     Socioeconomic History     Marital status:      Spouse name: Not on file     Number of children: Not on file     Years of education: Not on file     Highest education level: Not on file   Social Needs     Financial resource strain: Not on file     Food insecurity - worry: Not on file     Food insecurity - inability: Not on file     Transportation needs - medical: Not on file     Transportation needs - non-medical: Not on file   Occupational History     Not on file   Tobacco Use     Smoking status: Never Smoker     Smokeless tobacco: Never Used   Substance and Sexual Activity     Alcohol use: Not on file     Drug use: Not on file     Sexual activity: Not on file   Other Topics Concern     Not on file   Social History Narrative     Not on file       Review of Systems  General:  Denies problem  Eyes: Denies problem  Ears/Nose/Throat: Denies problem  Cardiovascular: Denies problem  Respiratory:  Denies problem  Gastrointestinal:  Denies problem  Genitourinary: Denies problem  Musculoskeletal:  Denies problem  Skin: Denies problem  Neurologic: Denies problem  Psychiatric: Denies problem  Endocrine: Denies problem  Heme/Lymphatic: Denies problem   Allergic/Immunologic: Denies problem        Objective:         Vitals:    12/17/18 1558   BP: 100/70   Pulse: 60   Resp: 16   Weight: 124 lb (56.2 kg)   Height: 5'  "2.5\" (1.588 m)     Body mass index is 22.32 kg/m .    Physical Exam:  General Appearance: Alert, cooperative, no distress, appears stated age  Head: Normocephalic, without obvious abnormality, atraumatic  Eyes: PERRL, conjunctiva/corneas clear, EOM's intact  Ears: Normal TM's and external ear canals, both ears  Nose: Nares normal, septum midline,mucosa normal, no drainage  Throat: Lips, mucosa, and tongue normal; teeth and gums normal  Neck: Supple, symmetrical, trachea midline, no adenopathy;  thyroid: not enlarged, symmetric, no tenderness/mass/nodules; no carotid bruit or JVD  Back: Symmetric, no curvature, ROM normal, no CVA tenderness  Lungs: Clear to auscultation bilaterally, respirations unlabored  Breasts: No breast masses, tenderness, asymmetry, or nipple discharge.  Heart: Regular rate and rhythm, S1 and S2 normal, no murmur, rub, or gallop  Abdomen: Soft, non-tender, bowel sounds active all four quadrants,  no masses, no organomegaly  Pelvic:Normally developed genitalia with no external lesions or eruptions. Vagina and cervix show no lesions, inflammation, or tenderness. Thick white discharge present No cystocele, No rectocele. Uterus non-tender, mobile.  No adnexal mass or tenderness.  Extremities: Extremities normal, atraumatic, no cyanosis or edema  Skin: Skin color, texture, turgor normal, no rashes or lesions  Lymph nodes: Cervical, supraclavicular, and axillary nodes normal  Neurologic: Normal      Results for orders placed or performed in visit on 12/17/18   Wet Prep, Vaginal   Result Value Ref Range    Yeast Result Yeast Seen (!) No yeast seen    Trichomonas No Trichomonas seen No Trichomonas seen    Clue Cells, Wet Prep No Clue cells seen No Clue cells seen       "

## 2021-06-29 NOTE — PROGRESS NOTES
"Progress Notes by Lina Barboza APRN, CNM at 2020 11:40 AM     Author: Lina Barboza APRN, CNM Service: -- Author Type: Midwife    Filed: 2020 12:43 PM Encounter Date: 2020 Status: Signed    : Lina Barboza APRN, CNM (Midwife)       Shelly Salmeron is a 29 y.o. female who is being evaluated via a billable video visit.      The patient has been notified of following:     \"This video visit will be conducted via a call between you and your physician/provider. We have found that certain health care needs can be provided without the need for an in-person physical exam.  This service lets us provide the care you need with a video conversation.  If a prescription is necessary we can send it directly to your pharmacy.  If lab work is needed we can place an order for that and you can then stop by our lab to have the test done at a later time.    Video visits are billed at different rates depending on your insurance coverage. Please reach out to your insurance provider with any questions.    If during the course of the call the physician/provider feels a video visit is not appropriate, you will not be charged for this service.\"    Patient has given verbal consent to a Video visit? Yes  How would you like to obtain your AVS? AVS Preference: [employer].  If dropped by the video visit, the video invitation should be sent to: Send to e-mail at: [email or web address]  Will anyone else be joining your video visit? No        Video Start Time: 1200    Additional provider notes:  Assessment:        Shelly Salmeron is a 29 y.o. female here for postpartum follow up at 2 weeks postpartum.   Plan:   1.   2. Plans condoms for BC.  3. Reviewed outpatient [facility] resources within [facility].  4.  Follow-up at 6 weeks postpartum for routine visit or sooner as needed.    TT with patient 20 minutes with all of which in counseling or coordination of care.   Lina Barboza, " ALLEGRA HORVATH      Subjective:       Shelly Salmeron is a 29 y.o. female who presents for a postpartum follow up visit. She is 2 week postpartum following a spontaneous vaginal delivery. I have fully reviewed the prenatal and intrapartum course. The delivery was at 39 gestational weeks. Outcome: spontaneous vaginal delivery. The amount and color of the lochia is appropriate for the duration of recovery. The patient feels well. The baby is well and being fed by breast. Voiding without difficulty, lochia normal, tolerating normal diet, and passing flatus. .  The patient has no emotional concerns.  Postpartum depression screening score: 1    The following portions of the patient's history were reviewed and updated as appropriate: allergies, current medications and problem list    Review of Systems  General:  Denies problem  Gastrointestinal:  Denies problem, Genitourinary: Denies problem  Musculoskeletal:  Denies problem  Skin: Denies problem  Neurologic: Denies problem  Psychiatric: Denies problem     Objective:         There were no vitals filed for this visit.    Physical Exam:  No exam performed today    General Appearance: Alert, cooperative, no distress, appears stated age.     Video-Visit Details    Type of service:  Video Visit    Video End Time (time video stopped): 12:23 PM  Originating Location (pt. Location): Home    Distant Location (provider location):  [place]     Platform used for Video Visit: YULIET Booker CNM   Signed              []Hide copied text    []Eliudver for details  Vaginal Delivery Procedure Note     Name: Shelly Salmeron  :  1990  MRN:  [id number]     Delivery Date:  2020  Delivery Time: 1620     OBSTETRICAL / DATING HISTORY:  Estimated Date of Delivery: 20  39w3d                      OB History    Para Term  AB Living   1             SAB TAB Ectopic Multiple Live Births                         # Outcome Date GA Lbr John/2nd  Weight Sex Delivery Anes PTL Lv   1 Current                           Labor Events  Shelly Salmeron proceeded to complete dilation at 1530. Started pushing at that time. Pushed effectively in semi reclined position and L tilt with coaching from RN, ALLEGRA & Milind. Cat II tracing with recurrent variables during pushing. Interventions included reducing pitocin from 6 mU to 4 mU, position changes, and intermittent pushing. Moderate variability throughout 2nd stage.       of a viable male infant at 1620. Nuchal cord noted & delivered through, summersaulted out at perineum. Infant delivered to unbroken shelf and placed on maternal abd. IV pitocin started for AMTSL. Cord clamped X2 and cut after 3 minutes, when placenta noted to be delivering. Apgars 9 & 9. Spontaneous nova delivery of apparently intact placenta with 3 vessel cord at 1625. Trailing membranes noted; walked out with ring forceps. Inspection of perineum reveals shallow R, L & center periurethral tears as well as first degree tear. Dr. Almeida, [facility], called to room d/t proximity to urethral. See her note for repair of R periurethral lacerations. Other periurethral lacerations shallow, hemostatic, left unrepaired. First degree laceration repaired in usual fashion under local and epidural anesth with 3-0 Vicryl suture. Fundus firm, midline, U/2. Mom and baby stable in fourth stage.     Induction: None    Induction indication:    Augmentation: Oxytocin;AROM             Rupture date: 2020    Rupture time: 2:15 AM    Rupture type: Spontaneous - SROM    Fluid color: Clear       Anesthesia/Analgesia  Method: Epidural;Local   Analgesics: Lidocaine      Monitors:External   Fetal heart rate in second stage Cat II w moderate variability.     Birth Information  YOB: 2021   Time of birth: 1620      Delivery type: Vaginal, Spontaneous          Observed anomalies/comments:       Presentation/Position: Vertex ; Middle  Occiput  Anterior       Infant  Sex: male   Weight (oz):      Length (in):    Living? Living                 APGARS  One minute Five minutes Ten minutes Fifteen minutes Twenty minutes   Skin color: 1   1            Heart rate: 2   2            Grimace: 2   2              Muscle tone: 2   2              Breathin   2              Totals: 9   9                 Resuscitation: None       Placenta  Delivered: 2020  4:25 PM   Appearance:    Removal: Spontaneous     Disposition: Discarded      Cord Information: 3 vessels      Disposition of cord blood: Discarded     Blood gases sent? No   Complications: Nuchal    Nuchal intervention: EPIC#[id number]    Nuchal cord description: loose    Cord around:    Number of loops: 1    Comments:       Laceration Repaired?   Perineal: 1st degree laceration  Yes    Periurethral: bilateral  Yes    Labial:      Sulcus:      Vaginal:         Cervical:            Repair suture: 4-0 Vicryl;3-0 Vicryl    Number of repair packets:    Total quantified blood loss (mL):       Fundus firm and bleeding light. AM draw deferred     Other Procedures: None      Pt and infant stable and skin-to-skin in the 4th stage.      Provider:  YULIET Dejesus, SUEM     Total time spent with patient: 90 minutes, >50% time spent counseling and coordination of care ([phone]).     Date:  2020  Time:  5:06 PM

## 2021-07-03 NOTE — ADDENDUM NOTE
Addendum Note by Jocelyne Ricketts APRN, CNM at 8/19/2020  2:40 PM     Author: Jocelyne Ricketts APRN, CNM Service: -- Author Type: Midwife    Filed: 8/19/2020  4:38 PM Encounter Date: 8/19/2020 Status: Signed    : Jocelyne Ricketts APRN, CNM (Midwife)    Addended by: JOCELYNE RICKETTS on: 8/19/2020 04:38 PM        Modules accepted: Orders

## 2021-07-14 PROBLEM — O47.9 UTERINE CONTRACTIONS DURING PREGNANCY: Status: RESOLVED | Noted: 2020-08-22 | Resolved: 2020-08-24

## 2021-07-14 PROBLEM — Z34.90 PREGNANT: Status: RESOLVED | Noted: 2020-08-23 | Resolved: 2020-08-24

## 2021-07-14 PROBLEM — O42.90 AMNIOTIC FLUID LEAKING: Status: RESOLVED | Noted: 2020-08-23 | Resolved: 2020-08-24

## 2021-10-11 ENCOUNTER — HEALTH MAINTENANCE LETTER (OUTPATIENT)
Age: 31
End: 2021-10-11

## 2021-11-21 ENCOUNTER — OFFICE VISIT (OUTPATIENT)
Dept: URGENT CARE | Facility: URGENT CARE | Age: 31
End: 2021-11-21
Payer: COMMERCIAL

## 2021-11-21 VITALS
BODY MASS INDEX: 23 KG/M2 | HEART RATE: 73 BPM | DIASTOLIC BLOOD PRESSURE: 68 MMHG | RESPIRATION RATE: 16 BRPM | SYSTOLIC BLOOD PRESSURE: 110 MMHG | TEMPERATURE: 97.8 F | WEIGHT: 134 LBS | OXYGEN SATURATION: 98 %

## 2021-11-21 DIAGNOSIS — J06.9 UPPER RESPIRATORY TRACT INFECTION, UNSPECIFIED TYPE: Primary | ICD-10-CM

## 2021-11-21 DIAGNOSIS — R05.9 COUGH: ICD-10-CM

## 2021-11-21 LAB
FLUAV AG SPEC QL IA: NEGATIVE
FLUBV AG SPEC QL IA: NEGATIVE

## 2021-11-21 PROCEDURE — U0005 INFEC AGEN DETEC AMPLI PROBE: HCPCS | Performed by: FAMILY MEDICINE

## 2021-11-21 PROCEDURE — 99213 OFFICE O/P EST LOW 20 MIN: CPT | Performed by: FAMILY MEDICINE

## 2021-11-21 PROCEDURE — U0003 INFECTIOUS AGENT DETECTION BY NUCLEIC ACID (DNA OR RNA); SEVERE ACUTE RESPIRATORY SYNDROME CORONAVIRUS 2 (SARS-COV-2) (CORONAVIRUS DISEASE [COVID-19]), AMPLIFIED PROBE TECHNIQUE, MAKING USE OF HIGH THROUGHPUT TECHNOLOGIES AS DESCRIBED BY CMS-2020-01-R: HCPCS | Performed by: FAMILY MEDICINE

## 2021-11-21 PROCEDURE — 87804 INFLUENZA ASSAY W/OPTIC: CPT | Performed by: FAMILY MEDICINE

## 2021-11-21 RX ORDER — CODEINE PHOSPHATE AND GUAIFENESIN 10; 100 MG/5ML; MG/5ML
1-2 SOLUTION ORAL EVERY 6 HOURS PRN
Qty: 118 ML | Refills: 0 | Status: SHIPPED | OUTPATIENT
Start: 2021-11-21 | End: 2022-02-09

## 2021-11-21 NOTE — PATIENT INSTRUCTIONS

## 2021-11-21 NOTE — PROGRESS NOTES
Assessment & Plan     Upper respiratory tract infection, unspecified type  Discussed in detail differentials and further management. Symptoms are likely secondary to viral infection.  Influenza test negative.  COVID-19 test ordered.  Cheratussin AC prescribed for cough control, common side effects discussed.  Recommended well hydration, over-the-counter analgesia, warm fluids and humidifier use. Follow up if symptoms persist or worsen. Written instructions/information provided. Patient understood and in agreement with the above plan. All questions answered.   - guaiFENesin-codeine (ROBITUSSIN AC) 100-10 MG/5ML solution; Take 5-10 mLs by mouth every 6 hours as needed for cough    Cough  - Influenza A/B antigen  - Symptomatic COVID-19 Virus (Coronavirus) by PCR Nasopharyngeal      Darrion Perez MD  [employer]    Tip Bravo is a 30 year old who presents for the following health issues    HPI     Concern -   Onset: 4 days   Description: cough, congestion, hoarse voice and runny nose   Intensity: moderate  Progression of Symptoms:  worsening  Accompanying Signs & Symptoms: no fever, chills, sob   Previous history of similar problem:   Therapies tried and outcome: OTC decongestant, cough meds  Physical therapist, 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      Objective    /68 (BP Location: Right arm, Patient Position: Sitting, Cuff Size: Adult Regular)   Pulse 73   Temp 97.8  F (36.6  C) (Tympanic)   Resp 16   Wt 60.8 kg (134 lb)   LMP 11/17/2021 (Exact Date)   SpO2 98%   BMI 23.00 kg/m    Body mass index is 23 kg/m .  Physical Exam   GENERAL: healthy, alert and no distress  EYES: Eyes grossly normal to inspection, PERRL and conjunctivae and sclerae normal  HENT: normal cephalic/atraumatic, ear canals and TM's normal,  nose and mouth without ulcers or lesions, oropharynx clear and oral mucous membranes moist  NECK: no adenopathy, no asymmetry, masses, or scars and thyroid normal to palpation  RESP: lungs clear to auscultation - no rales, rhonchi or wheezes  CV: regular rate and rhythm, normal S1 S2, no S3 or S4, no murmur, click or rub  MS: no gross musculoskeletal defects noted, no edema  NEURO: Normal strength and tone, mentation intact and speech normal  PSYCH: mentation appears normal, affect normal/bright    Results for orders placed or performed in visit on 11/21/21   Influenza A/B antigen     Status: Normal    Specimen: Nose; Swab   Result Value Ref Range    Influenza A antigen Negative Negative    Influenza B antigen Negative Negative    Narrative    Test results must be correlated with clinical data. If necessary, results should be confirmed by a molecular assay or viral culture.

## 2021-11-22 LAB — SARS-COV-2 RNA RESP QL NAA+PROBE: NEGATIVE

## 2022-02-09 ENCOUNTER — PRENATAL OFFICE VISIT (OUTPATIENT)
Dept: OBGYN | Facility: CLINIC | Age: 32
End: 2022-02-09

## 2022-02-09 ENCOUNTER — APPOINTMENT (OUTPATIENT)
Dept: OBGYN | Facility: CLINIC | Age: 32
End: 2022-02-09

## 2022-02-09 DIAGNOSIS — Z34.80 PRENATAL CARE, SUBSEQUENT PREGNANCY: ICD-10-CM

## 2022-02-09 PROCEDURE — 99207 PR NO CHARGE NURSE ONLY: CPT | Performed by: OBSTETRICS & GYNECOLOGY

## 2022-02-09 NOTE — PROGRESS NOTES
Denied need for review of teaching  [facility] OB Intake Nurse    Patient supplied answers from flow sheet for:  Prenatal OB Questionnaire.

## 2022-03-01 ENCOUNTER — PRENATAL OFFICE VISIT (OUTPATIENT)
Dept: OBGYN | Facility: CLINIC | Age: 32
End: 2022-03-01
Payer: COMMERCIAL

## 2022-03-01 VITALS
HEIGHT: 63 IN | HEART RATE: 105 BPM | SYSTOLIC BLOOD PRESSURE: 115 MMHG | DIASTOLIC BLOOD PRESSURE: 80 MMHG | TEMPERATURE: 98.6 F | BODY MASS INDEX: 26.33 KG/M2 | WEIGHT: 148.6 LBS

## 2022-03-01 DIAGNOSIS — Z34.81 ENCOUNTER FOR SUPERVISION OF OTHER NORMAL PREGNANCY IN FIRST TRIMESTER: ICD-10-CM

## 2022-03-01 DIAGNOSIS — Z34.82 PRENATAL CARE, SUBSEQUENT PREGNANCY IN SECOND TRIMESTER: Primary | ICD-10-CM

## 2022-03-01 LAB
ABO/RH(D): NORMAL
ALBUMIN UR-MCNC: NEGATIVE MG/DL
ANTIBODY SCREEN: NEGATIVE
APPEARANCE UR: CLEAR
BACTERIA #/AREA URNS HPF: ABNORMAL /HPF
BILIRUB UR QL STRIP: NEGATIVE
COLOR UR AUTO: YELLOW
ERYTHROCYTE [DISTWIDTH] IN BLOOD BY AUTOMATED COUNT: 12.2 % (ref 10–15)
GLUCOSE UR STRIP-MCNC: NEGATIVE MG/DL
HCT VFR BLD AUTO: 40.3 % (ref 35–47)
HGB BLD-MCNC: 13.5 G/DL (ref 11.7–15.7)
HGB UR QL STRIP: ABNORMAL
KETONES UR STRIP-MCNC: NEGATIVE MG/DL
LEUKOCYTE ESTERASE UR QL STRIP: NEGATIVE
MCH RBC QN AUTO: 30.8 PG (ref 26.5–33)
MCHC RBC AUTO-ENTMCNC: 33.5 G/DL (ref 31.5–36.5)
MCV RBC AUTO: 92 FL (ref 78–100)
NITRATE UR QL: NEGATIVE
PH UR STRIP: 5 [PH] (ref 5–7)
PLATELET # BLD AUTO: 294 10E3/UL (ref 150–450)
RBC # BLD AUTO: 4.38 10E6/UL (ref 3.8–5.2)
RBC #/AREA URNS AUTO: ABNORMAL /HPF
SP GR UR STRIP: <=1.005 (ref 1–1.03)
SPECIMEN EXPIRATION DATE: NORMAL
SQUAMOUS #/AREA URNS AUTO: ABNORMAL /LPF
UROBILINOGEN UR STRIP-ACNC: 0.2 E.U./DL
WBC # BLD AUTO: 12 10E3/UL (ref 4–11)
WBC #/AREA URNS AUTO: ABNORMAL /HPF

## 2022-03-01 PROCEDURE — 87340 HEPATITIS B SURFACE AG IA: CPT | Performed by: OBSTETRICS & GYNECOLOGY

## 2022-03-01 PROCEDURE — 86901 BLOOD TYPING SEROLOGIC RH(D): CPT | Performed by: OBSTETRICS & GYNECOLOGY

## 2022-03-01 PROCEDURE — 85027 COMPLETE CBC AUTOMATED: CPT | Performed by: OBSTETRICS & GYNECOLOGY

## 2022-03-01 PROCEDURE — 81001 URINALYSIS AUTO W/SCOPE: CPT | Performed by: OBSTETRICS & GYNECOLOGY

## 2022-03-01 PROCEDURE — 86900 BLOOD TYPING SEROLOGIC ABO: CPT | Performed by: OBSTETRICS & GYNECOLOGY

## 2022-03-01 PROCEDURE — 86762 RUBELLA ANTIBODY: CPT | Performed by: OBSTETRICS & GYNECOLOGY

## 2022-03-01 PROCEDURE — 87389 HIV-1 AG W/HIV-1&-2 AB AG IA: CPT | Performed by: OBSTETRICS & GYNECOLOGY

## 2022-03-01 PROCEDURE — 86803 HEPATITIS C AB TEST: CPT | Performed by: OBSTETRICS & GYNECOLOGY

## 2022-03-01 PROCEDURE — 87902 NFCT AGT GNTYP ALYS HEP C: CPT | Performed by: OBSTETRICS & GYNECOLOGY

## 2022-03-01 PROCEDURE — 87086 URINE CULTURE/COLONY COUNT: CPT | Performed by: OBSTETRICS & GYNECOLOGY

## 2022-03-01 PROCEDURE — 36415 COLL VENOUS BLD VENIPUNCTURE: CPT | Performed by: OBSTETRICS & GYNECOLOGY

## 2022-03-01 PROCEDURE — 76815 OB US LIMITED FETUS(S): CPT | Performed by: OBSTETRICS & GYNECOLOGY

## 2022-03-01 PROCEDURE — 86780 TREPONEMA PALLIDUM: CPT | Performed by: OBSTETRICS & GYNECOLOGY

## 2022-03-01 PROCEDURE — 86850 RBC ANTIBODY SCREEN: CPT | Performed by: OBSTETRICS & GYNECOLOGY

## 2022-03-01 PROCEDURE — 99207 PR FIRST OB VISIT: CPT | Performed by: OBSTETRICS & GYNECOLOGY

## 2022-03-01 RX ORDER — PRENATAL VIT/IRON FUM/FOLIC AC 27MG-0.8MG
1 TABLET ORAL DAILY
COMMUNITY

## 2022-03-01 NOTE — PROGRESS NOTES
"Shelly Salmeron is a 31 year old   who presents to the clinic for an new ob visit.    Estimated Date of Delivery: Sep 22, 2022 is calculated from Patient's last menstrual period was 2021.   She has not had bleeding since her LMP.   She has had mild nausea. Weigh loss has not occurred.   This was a planned pregnancy.   FOB is involved,  Christianity   OTHER CONCERNS: had covid in [place]  INFECTION HISTORY  HIV: no  Hepatitis B: no  Hepatitis C: no  Syphilis:  no  Tuberculosis: no   PPD- no  Herpes self: no  Herpes partner:  no  Chlamydia:  no  Gonorrhea:  no  HPV: no  BV:  no  Trichomonis:  no  Chicken Pox:  YES  ====================================================  PERSONAL/SOCIAL HISTORY  Lives lives with their family.  Employment: Full time.  Her job involves moderate activity . PT for [employer]  Additional items: None  =====================================================   REVIEW OF SYSTEMS  CONSTITUTIONAL: NEGATIVE for fever, chills  INTEGUMENTARY/SKIN: NEGATIVE for worrisome rashes, moles or lesions  EYES: NEGATIVE for vision changes   ENT/MOUTH: NEGATIVE for ear, mouth and throat problems  RESP: NEGATIVE for significant cough or SOB  BREAST: NEGATIVE for masses, tenderness or discharge  CV: NEGATIVE for chest pain, palpitations   GI: NEGATIVE for nausea, abdominal pain, heartburn, or change in bowel habits  : NEGATIVE for frequency, dysuria, or hematuria  MUSCULOSKELETAL: NEGATIVE for significant arthralgias or myalgia  NEURO: NEGATIVE for weakness, dizziness or paresthesias or headache  ENDOCRINE: NEGATIVE for temperature intolerance, skin/hair changes  HEME: NEGATIVE for bleeding problems  PSYCHIATRIC: NEGATIVE for changes in mood or affect  ====================================================  PHYSICAL EXAM:  /80   Pulse 105   Temp 98.6  F (37  C)   Ht 1.6 m (5' 3\")   Wt 67.4 kg (148 lb 9.6 oz)   LMP 2021   BMI 26.32 kg/m    BMI- Body mass index is 26.32 kg/m .,     " RECOMMENDED WEIGHT GAIN: 15-25 lbs.  GENERAL:  Pleasant pregnant female, alert, well groomed.  SKIN:  Warm and dry, without lesions or rashes  HEAD: Symmetrical features.  EYES:  PERRLA,   MOUTH:  Buccal mucosa pink, moist without lesions.    NECK:  Thyroid without enlargement and nodules.  Lymph nodes not palpable.   LUNGS:  Clear to auscultation.  BREAST:  Symmetrical.  No dominant, fixed or suspicious masses are noted.  No skin or nipple changes or axillary nodes.  Self exam is taught and encouraged.  Nipples everted.      HEART:  RRR without murmur.  ABDOMEN: Soft without masses , tenderness or organomegaly.  No CVA tenderness. No scars noted..   MUSCULOSKELETAL:  Full range of motion  EXTREMITIES:  No edema. No significant varicosities.   GENITALIA:Deferred  PELVIS:   Adequate, Pelvis proven to 8 pounds 2 ounces.  RECTAL:  Normal appearance.  Digital exam deferred.  WET PREP:Not done  gonorrhea  =========================================  ASSESSMENT:  (Z34.82) Prenatal care, subsequent pregnancy in second trimester  (primary encounter diagnosis)  Comment: Estimated Date of Delivery: Sep 22, 2022   Plan: US OB <14 Weeks w Transvaginal Single, ABO/Rh         type and screen, CBC with platelets, Hepatitis         B surface antigen, Rubella Antibody IgG, HIV         Antigen Antibody Combo, Treponema Abs w Reflex         to RPR and Titer, Hepatitis C Screen Reflex to         HCV RNA Quant and Genotype, UA with         Microscopic, Urine Culture, Urine Microscopic         Exam, CANCELED: Chlamydia trachomatis/Neisseria        gonorrhoeae by PCR            (Z34.81) Encounter for supervision of other normal pregnancy in first trimester  Comment:   Plan:     PREGNANCY AT RISK? no  ==========================================  PLAN:  Instructed on use of triage nurse line and contacting the on call CNM after hours for an urgent need such as fever, vagina bleeding, bladder or vaginal infection, rupture of membranes,   or term labor.    Discussed the indications, uses for and false positives for quad screen, nuchal translucency and fetal survey ultrasound at 18-20 weeks gestation. Will inform us at the next visit if she wished to avail herself of these screens.  Instructed on best evidence for: weight gain for her BMI for pregnancy; healthy diet and foods to avoid; exercise and activity during pregnancy;avoiding exposure to toxoplasmosis; and maintenance of a generally healthy lifestyle.   Discussed the harms, benefits, side effects and alternative therapies for current prescribed and OTC medications.  Sixto Jones MD

## 2022-03-02 LAB
HBV SURFACE AG SERPL QL IA: NONREACTIVE
HCV AB SERPL QL IA: REACTIVE
HIV 1+2 AB+HIV1 P24 AG SERPL QL IA: NONREACTIVE
RUBV IGG SERPL QL IA: 5.06 INDEX
RUBV IGG SERPL QL IA: POSITIVE
T PALLIDUM AB SER QL: NONREACTIVE

## 2022-03-03 LAB
BACTERIA UR CULT: NO GROWTH
HCV RNA SERPL NAA+PROBE-ACNC: NOT DETECTED IU/ML

## 2022-03-03 NOTE — RESULT ENCOUNTER NOTE
"Your results are back and they are normal.  As I mentioned on my message to you.  The Hepatitis C initial test was \"Reactive\" but was found, on further testing , to be a False Positive.    Sixto Jones MD      "

## 2022-04-15 ENCOUNTER — PRENATAL OFFICE VISIT (OUTPATIENT)
Dept: OBGYN | Facility: CLINIC | Age: 32
End: 2022-04-15
Payer: COMMERCIAL

## 2022-04-15 VITALS
HEART RATE: 65 BPM | HEIGHT: 63 IN | TEMPERATURE: 97.7 F | SYSTOLIC BLOOD PRESSURE: 97 MMHG | DIASTOLIC BLOOD PRESSURE: 53 MMHG | RESPIRATION RATE: 16 BRPM | WEIGHT: 151 LBS | BODY MASS INDEX: 26.75 KG/M2

## 2022-04-15 DIAGNOSIS — Z34.82 PRENATAL CARE, SUBSEQUENT PREGNANCY IN SECOND TRIMESTER: Primary | ICD-10-CM

## 2022-04-15 DIAGNOSIS — Z23 HIGH PRIORITY FOR 2019-NCOV VACCINE: ICD-10-CM

## 2022-04-15 DIAGNOSIS — Z23 NEED FOR PROPHYLACTIC VACCINATION AND INOCULATION AGAINST INFLUENZA: ICD-10-CM

## 2022-04-15 PROBLEM — Z30.49 ENCOUNTER FOR SURVEILLANCE OF OTHER CONTRACEPTIVE: Status: RESOLVED | Noted: 2021-05-17 | Resolved: 2022-04-15

## 2022-04-15 PROCEDURE — 99207 PR PRENATAL VISIT: CPT | Performed by: OBSTETRICS & GYNECOLOGY

## 2022-04-15 PROCEDURE — 36415 COLL VENOUS BLD VENIPUNCTURE: CPT | Performed by: OBSTETRICS & GYNECOLOGY

## 2022-04-15 PROCEDURE — 0054A COVID-19,PF,PFIZER (12+ YRS): CPT | Performed by: OBSTETRICS & GYNECOLOGY

## 2022-04-15 PROCEDURE — 90471 IMMUNIZATION ADMIN: CPT | Performed by: OBSTETRICS & GYNECOLOGY

## 2022-04-15 PROCEDURE — 91305 COVID-19,PF,PFIZER (12+ YRS): CPT | Performed by: OBSTETRICS & GYNECOLOGY

## 2022-04-15 PROCEDURE — 90686 IIV4 VACC NO PRSV 0.5 ML IM: CPT | Performed by: OBSTETRICS & GYNECOLOGY

## 2022-04-15 ASSESSMENT — PATIENT HEALTH QUESTIONNAIRE - PHQ9: SUM OF ALL RESPONSES TO PHQ QUESTIONS 1-9: 2

## 2022-04-15 NOTE — NURSING NOTE
"Initial BP 97/53 (BP Location: Left arm, Patient Position: Chair, Cuff Size: Adult Regular)   Pulse 65   Temp 97.7  F (36.5  C) (Tympanic)   Resp 16   Ht 1.6 m (5' 3\")   Wt 68.5 kg (151 lb)   LMP 12/16/2021   Breastfeeding No   BMI 26.75 kg/m   Estimated body mass index is 26.75 kg/m  as calculated from the following:    Height as of this encounter: 1.6 m (5' 3\").    Weight as of this encounter: 68.5 kg (151 lb). .    Paty López, [facility]    "

## 2022-04-15 NOTE — PROGRESS NOTES
"CC: Here for routine prenatal visit @ 17w1d   HPI: possible FM, no ctx, no LOF, no VB.  No complaints.     PE: BP 97/53 (BP Location: Left arm, Patient Position: Chair, Cuff Size: Adult Regular)   Pulse 65   Temp 97.7  F (36.5  C) (Tympanic)   Resp 16   Ht 1.6 m (5' 3\")   Wt 68.5 kg (151 lb)   LMP 2021   Breastfeeding No   BMI 26.75 kg/m     See OB flowsheet    A/P  @ 17w1d normal pregnancy    1. Routine prenatal care.  COVID restrictions and recommendations reviewed including iron supplementation.  NIPT ordered today.  U/S ordered for anomaly screen.     RTC 3-4 weeks.      Rosa Alas M.D.    "

## 2022-04-21 LAB — SCANNED LAB RESULT: NORMAL

## 2022-05-09 ENCOUNTER — PRENATAL OFFICE VISIT (OUTPATIENT)
Dept: OBGYN | Facility: CLINIC | Age: 32
End: 2022-05-09
Payer: COMMERCIAL

## 2022-05-09 ENCOUNTER — HOSPITAL ENCOUNTER (OUTPATIENT)
Dept: ULTRASOUND IMAGING | Facility: CLINIC | Age: 32
Discharge: HOME | End: 2022-05-09
Attending: OBSTETRICS & GYNECOLOGY | Admitting: OBSTETRICS & GYNECOLOGY
Payer: COMMERCIAL

## 2022-05-09 VITALS
BODY MASS INDEX: 27.99 KG/M2 | SYSTOLIC BLOOD PRESSURE: 99 MMHG | DIASTOLIC BLOOD PRESSURE: 61 MMHG | TEMPERATURE: 97.8 F | HEART RATE: 66 BPM | WEIGHT: 158 LBS

## 2022-05-09 DIAGNOSIS — Z34.82 PRENATAL CARE, SUBSEQUENT PREGNANCY IN SECOND TRIMESTER: ICD-10-CM

## 2022-05-09 DIAGNOSIS — Z34.92 PRENATAL CARE, SECOND TRIMESTER: Primary | ICD-10-CM

## 2022-05-09 PROCEDURE — 76805 OB US >/= 14 WKS SNGL FETUS: CPT

## 2022-05-09 PROCEDURE — 99207 PR PRENATAL VISIT: CPT | Performed by: OBSTETRICS & GYNECOLOGY

## 2022-05-09 NOTE — PROGRESS NOTES
CC: Here for routine prenatal visit @ 20w4d   HPI: possible FM, no ctx, no LOF, no VB.  No complaints.      PE: BP 99/61 (BP Location: Left arm, Patient Position: Chair, Cuff Size: Adult Regular)   Pulse 66   Temp 97.8  F (36.6  C) (Tympanic)   Wt 71.7 kg (158 lb)   LMP 2021   Breastfeeding No   BMI 27.99 kg/m       Doptones normal FHR on US today     A/P  @ 20w4d normal pregnancy     1. Routine prenatal care.  COVID restrictions and recommendations reviewed including iron supplementation.  NIPT WNL  U/S pending today     RTC 4 weeks      Adriana Martino MD   2022 10:55 AM

## 2022-05-09 NOTE — NURSING NOTE
"Initial BP 99/61 (BP Location: Left arm, Patient Position: Chair, Cuff Size: Adult Regular)   Pulse 66   Temp 97.8  F (36.6  C) (Tympanic)   Wt 71.7 kg (158 lb)   LMP 12/16/2021   Breastfeeding No   BMI 27.99 kg/m   Estimated body mass index is 27.99 kg/m  as calculated from the following:    Height as of 4/15/22: 1.6 m (5' 3\").    Weight as of this encounter: 71.7 kg (158 lb). .    Martita Chang MA    "

## 2022-06-06 ENCOUNTER — PRENATAL OFFICE VISIT (OUTPATIENT)
Dept: OBGYN | Facility: CLINIC | Age: 32
End: 2022-06-06
Payer: COMMERCIAL

## 2022-06-06 VITALS
DIASTOLIC BLOOD PRESSURE: 63 MMHG | WEIGHT: 163 LBS | HEART RATE: 93 BPM | TEMPERATURE: 98.4 F | SYSTOLIC BLOOD PRESSURE: 120 MMHG | BODY MASS INDEX: 28.87 KG/M2

## 2022-06-06 DIAGNOSIS — Z34.92 PRENATAL CARE, SECOND TRIMESTER: ICD-10-CM

## 2022-06-06 LAB
ERYTHROCYTE [DISTWIDTH] IN BLOOD BY AUTOMATED COUNT: 13 % (ref 10–15)
GLUCOSE 1H P 50 G GLC PO SERPL-MCNC: 121 MG/DL (ref 70–129)
HCT VFR BLD AUTO: 36.7 % (ref 35–47)
HGB BLD-MCNC: 11.8 G/DL (ref 11.7–15.7)
MCH RBC QN AUTO: 31.6 PG (ref 26.5–33)
MCHC RBC AUTO-ENTMCNC: 32.2 G/DL (ref 31.5–36.5)
MCV RBC AUTO: 98 FL (ref 78–100)
PLATELET # BLD AUTO: 263 10E3/UL (ref 150–450)
RBC # BLD AUTO: 3.73 10E6/UL (ref 3.8–5.2)
WBC # BLD AUTO: 11.4 10E3/UL (ref 4–11)

## 2022-06-06 PROCEDURE — 82950 GLUCOSE TEST: CPT | Performed by: OBSTETRICS & GYNECOLOGY

## 2022-06-06 PROCEDURE — 36415 COLL VENOUS BLD VENIPUNCTURE: CPT | Performed by: OBSTETRICS & GYNECOLOGY

## 2022-06-06 PROCEDURE — 85027 COMPLETE CBC AUTOMATED: CPT | Performed by: OBSTETRICS & GYNECOLOGY

## 2022-06-06 PROCEDURE — 99207 PR PRENATAL VISIT: CPT | Performed by: OBSTETRICS & GYNECOLOGY

## 2022-06-06 PROCEDURE — 86780 TREPONEMA PALLIDUM: CPT | Performed by: OBSTETRICS & GYNECOLOGY

## 2022-06-06 NOTE — PROGRESS NOTES
CC: Here for routine prenatal visit @ 24w4d   HPI: possible FM, no ctx, no LOF, no VB.  No complaints.      PE: /63 (BP Location: Right arm, Patient Position: Chair, Cuff Size: Adult Regular)   Pulse 93   Temp 98.4  F (36.9  C) (Tympanic)   Wt 73.9 kg (163 lb)   LMP 2021   Breastfeeding No   BMI 28.87 kg/m        Doptones: 140s     A/P  @ 24w4d normal pregnancy     1. Routine prenatal care.  COVID restrictions and recommendations reviewed including iron supplementation.  NIPT, US  WNL   RTC 4 weeks        Adriana Martino MD   2022 1:19 PM

## 2022-06-06 NOTE — NURSING NOTE
"Initial /63 (BP Location: Right arm, Patient Position: Chair, Cuff Size: Adult Regular)   Pulse 93   Temp 98.4  F (36.9  C) (Tympanic)   Wt 73.9 kg (163 lb)   LMP 12/16/2021   Breastfeeding No   BMI 28.87 kg/m   Estimated body mass index is 28.87 kg/m  as calculated from the following:    Height as of 4/15/22: 1.6 m (5' 3\").    Weight as of this encounter: 73.9 kg (163 lb). .      Martita Chang MA    "

## 2022-06-07 LAB — T PALLIDUM AB SER QL: NONREACTIVE

## 2022-07-02 ENCOUNTER — HEALTH MAINTENANCE LETTER (OUTPATIENT)
Age: 32
End: 2022-07-02

## 2022-07-07 ENCOUNTER — PRENATAL OFFICE VISIT (OUTPATIENT)
Dept: OBGYN | Facility: CLINIC | Age: 32
End: 2022-07-07
Payer: COMMERCIAL

## 2022-07-07 VITALS
DIASTOLIC BLOOD PRESSURE: 62 MMHG | RESPIRATION RATE: 16 BRPM | TEMPERATURE: 97.4 F | BODY MASS INDEX: 29.8 KG/M2 | HEIGHT: 63 IN | WEIGHT: 168.2 LBS | SYSTOLIC BLOOD PRESSURE: 104 MMHG | HEART RATE: 68 BPM

## 2022-07-07 DIAGNOSIS — F41.8 DEPRESSION WITH ANXIETY: ICD-10-CM

## 2022-07-07 DIAGNOSIS — O26.843 UTERINE SIZE-DATE DISCREPANCY IN THIRD TRIMESTER: ICD-10-CM

## 2022-07-07 DIAGNOSIS — Z34.83 PRENATAL CARE, SUBSEQUENT PREGNANCY IN THIRD TRIMESTER: Primary | ICD-10-CM

## 2022-07-07 PROBLEM — L20.89 OTHER ATOPIC DERMATITIS: Status: ACTIVE | Noted: 2020-03-16

## 2022-07-07 PROBLEM — L20.89 OTHER ATOPIC DERMATITIS: Chronic | Status: ACTIVE | Noted: 2020-03-16

## 2022-07-07 PROBLEM — I49.9 ARRHYTHMIA: Status: RESOLVED | Noted: 2020-08-19 | Resolved: 2022-07-07

## 2022-07-07 PROCEDURE — 99207 PR PRENATAL VISIT: CPT | Performed by: OBSTETRICS & GYNECOLOGY

## 2022-07-07 NOTE — PATIENT INSTRUCTIONS
Over the counter Options for stress incontinence (the laugh/cough/sneeze leaking):  https://scenios.[employer]/how-it-works/  https://www.emoteShare/en-us/products/impressa/introduction  https://Ep[employer]ell/pages/how-it-works-1

## 2022-07-07 NOTE — PROGRESS NOTES
"CC: Here for routine prenatal visit @ 29w0d   HPI: + FM, no ctx, no LOF, no VB.  Having left sided rib pain     PE: /62 (BP Location: Left arm, Patient Position: Chair, Cuff Size: Adult Regular)   Pulse 68   Temp 97.4  F (36.3  C) (Tympanic)   Resp 16   Ht 1.6 m (5' 3\")   Wt 76.3 kg (168 lb 3.2 oz)   LMP 2021   Breastfeeding No   BMI 29.80 kg/m     See OB flowsheet    A/P  @ 29w0d normal pregnancy, costochondritis    1. Routine prenatal care.  COVID restrictions and recommendations reviewed including iron supplementation.   2. Costochrondritis: discussed supportive care.   3. Depression/anxiety: symptoms stable.  Zoloft refilled.      RTC 2-3 weeks.      Rosa Alas M.D.    "

## 2022-07-07 NOTE — NURSING NOTE
"Initial /62 (BP Location: Left arm, Patient Position: Chair, Cuff Size: Adult Regular)   Pulse 68   Temp 97.4  F (36.3  C) (Tympanic)   Resp 16   Ht 1.6 m (5' 3\")   Wt 76.3 kg (168 lb 3.2 oz)   LMP 12/16/2021   Breastfeeding No   BMI 29.80 kg/m   Estimated body mass index is 29.8 kg/m  as calculated from the following:    Height as of this encounter: 1.6 m (5' 3\").    Weight as of this encounter: 76.3 kg (168 lb 3.2 oz). .    Paty López, HAYDEN    "

## 2022-07-21 ENCOUNTER — PRENATAL OFFICE VISIT (OUTPATIENT)
Dept: OBGYN | Facility: CLINIC | Age: 32
End: 2022-07-21
Payer: COMMERCIAL

## 2022-07-21 ENCOUNTER — HOSPITAL ENCOUNTER (OUTPATIENT)
Dept: ULTRASOUND IMAGING | Facility: CLINIC | Age: 32
Discharge: HOME | End: 2022-07-21
Attending: OBSTETRICS & GYNECOLOGY | Admitting: OBSTETRICS & GYNECOLOGY
Payer: COMMERCIAL

## 2022-07-21 VITALS
HEIGHT: 63 IN | BODY MASS INDEX: 30.16 KG/M2 | WEIGHT: 170.2 LBS | TEMPERATURE: 97.6 F | HEART RATE: 76 BPM | RESPIRATION RATE: 16 BRPM | SYSTOLIC BLOOD PRESSURE: 104 MMHG | DIASTOLIC BLOOD PRESSURE: 61 MMHG

## 2022-07-21 DIAGNOSIS — Z34.83 ENCOUNTER FOR SUPERVISION OF OTHER NORMAL PREGNANCY IN THIRD TRIMESTER: Primary | ICD-10-CM

## 2022-07-21 DIAGNOSIS — Z34.83 PRENATAL CARE, SUBSEQUENT PREGNANCY IN THIRD TRIMESTER: ICD-10-CM

## 2022-07-21 DIAGNOSIS — O26.843 UTERINE SIZE-DATE DISCREPANCY IN THIRD TRIMESTER: ICD-10-CM

## 2022-07-21 PROCEDURE — 90715 TDAP VACCINE 7 YRS/> IM: CPT | Performed by: OBSTETRICS & GYNECOLOGY

## 2022-07-21 PROCEDURE — 99207 PR PRENATAL VISIT: CPT | Performed by: OBSTETRICS & GYNECOLOGY

## 2022-07-21 PROCEDURE — 90471 IMMUNIZATION ADMIN: CPT | Performed by: OBSTETRICS & GYNECOLOGY

## 2022-07-21 PROCEDURE — 76816 OB US FOLLOW-UP PER FETUS: CPT

## 2022-07-21 NOTE — PROGRESS NOTES
Prior to immunization administration, verified patients identity using patient s name and date of birth. Please see Immunization Activity for additional information.     Screening Questionnaire for Adult Immunization    Are you sick today?   No   Do you have allergies to medications, food, a vaccine component or latex?   No   Have you ever had a serious reaction after receiving a vaccination?   No   Do you have a long-term health problem with heart, lung, kidney, or metabolic disease (e.g., diabetes), asthma, a blood disorder, no spleen, complement component deficiency, a cochlear implant, or a spinal fluid leak?  Are you on long-term aspirin therapy?   No   Do you have cancer, leukemia, HIV/AIDS, or any other immune system problem?   No   Do you have a parent, brother, or sister with an immune system problem?   No   In the past 3 months, have you taken medications that affect  your immune system, such as prednisone, other steroids, or anticancer drugs; drugs for the treatment of rheumatoid arthritis, Crohn s disease, or psoriasis; or have you had radiation treatments?   No   Have you had a seizure, or a brain or other nervous system problem?   No   During the past year, have you received a transfusion of blood or blood    products, or been given immune (gamma) globulin or antiviral drug?   No   For women: Are you pregnant or is there a chance you could become       pregnant during the next month?   YES   Have you received any vaccinations in the past 4 weeks?   No          Per orders of Dr. Jones, injection of tdap given by Cherry Lenz CMA. Patient instructed to remain in clinic for 15 minutes afterwards, and to report any adverse reaction to me immediately.       Screening performed by Cherry Lenz CMA on 7/21/2022 at 11:20 AM.

## 2022-07-21 NOTE — PROGRESS NOTES
Concerns: interval growth scan shows normal growth  Doing well.  No concerns today.  No vaginal bleeding, LOF.  No contractions.  Reportable signs and symptoms discussed.  Discussed kick counts and fetal movement.  Discussed PTL, PROM, and when to call or come in.  RTC 2 weeks.    Sixto Jones MD

## 2022-07-21 NOTE — PROGRESS NOTES
Handed patient Breast Pump Insurance Questionnaire at today's visit.  Cherry Lenz CMA on 7/21/2022 at 11:22 AM

## 2022-08-03 ENCOUNTER — PRENATAL OFFICE VISIT (OUTPATIENT)
Dept: OBGYN | Facility: CLINIC | Age: 32
End: 2022-08-03
Payer: COMMERCIAL

## 2022-08-03 VITALS
WEIGHT: 172.1 LBS | HEART RATE: 73 BPM | BODY MASS INDEX: 30.49 KG/M2 | SYSTOLIC BLOOD PRESSURE: 104 MMHG | HEIGHT: 63 IN | TEMPERATURE: 98.4 F | DIASTOLIC BLOOD PRESSURE: 59 MMHG | RESPIRATION RATE: 16 BRPM

## 2022-08-03 DIAGNOSIS — Z34.83 ENCOUNTER FOR SUPERVISION OF OTHER NORMAL PREGNANCY IN THIRD TRIMESTER: Primary | ICD-10-CM

## 2022-08-03 PROCEDURE — 99207 PR PRENATAL VISIT: CPT | Performed by: ADVANCED PRACTICE MIDWIFE

## 2022-08-03 NOTE — PROGRESS NOTES
"S:Shelly presents alone today. Feels well,  Baby active.  Denies uterine cramping, vaginal bleeding or leaking of fluid. No concerns day. Restless leg in the evening. Wishing to have a cost estimate of the birth as she is on new insurance or different from her first baby. Mood is stable on Zoloft.   O: Vitals: /59 (BP Location: Right arm, Patient Position: Chair, Cuff Size: Adult Regular)   Pulse 73   Temp 98.4  F (36.9  C) (Tympanic)   Resp 16   Ht 1.6 m (5' 3\")   Wt 78.1 kg (172 lb 1.6 oz)   LMP 12/16/2021   BMI 30.49 kg/m    BMI= Body mass index is 30.49 kg/m .  Exam:  Constitutional: healthy, alert and no distress  Respiratory: respirations even and unlabored  Gastrointestinal: Abdomen soft, non-tender. Fundus measures appropriate for gestational age. Fetal heart tones hear without difficulty and within normal limits  : deferred   Psychiatric: mentation appears normal and affect normal/bright  A: (Z34.83) Encounter for supervision of other normal pregnancy in third trimester  (primary encounter diagnosis)  Comment:   Plan: breast pump at discharge due to insurance.   Encouraged patient to call with any questions or concerns.  Return to clinic 2 weeks  Lina Barboza CNM                "

## 2022-08-03 NOTE — NURSING NOTE
"Initial /59 (BP Location: Right arm, Patient Position: Chair, Cuff Size: Adult Regular)   Pulse 73   Temp 98.4  F (36.9  C) (Tympanic)   Resp 16   Ht 1.6 m (5' 3\")   Wt 78.1 kg (172 lb 1.6 oz)   LMP 12/16/2021   BMI 30.49 kg/m   Estimated body mass index is 30.49 kg/m  as calculated from the following:    Height as of this encounter: 1.6 m (5' 3\").    Weight as of this encounter: 78.1 kg (172 lb 1.6 oz). .      "

## 2022-08-18 ENCOUNTER — PRENATAL OFFICE VISIT (OUTPATIENT)
Dept: OBGYN | Facility: CLINIC | Age: 32
End: 2022-08-18
Payer: COMMERCIAL

## 2022-08-18 VITALS
HEIGHT: 63 IN | WEIGHT: 174.9 LBS | DIASTOLIC BLOOD PRESSURE: 67 MMHG | SYSTOLIC BLOOD PRESSURE: 109 MMHG | HEART RATE: 86 BPM | BODY MASS INDEX: 30.99 KG/M2 | RESPIRATION RATE: 16 BRPM | TEMPERATURE: 98.5 F

## 2022-08-18 DIAGNOSIS — Z34.83 ENCOUNTER FOR SUPERVISION OF OTHER NORMAL PREGNANCY IN THIRD TRIMESTER: Primary | ICD-10-CM

## 2022-08-18 PROCEDURE — 99207 PR PRENATAL VISIT: CPT | Performed by: OBSTETRICS & GYNECOLOGY

## 2022-08-18 NOTE — PROGRESS NOTES
Concerns:   Doing well.  No concerns today.  No vaginal bleeding, LOF.  No contractions.  Reportable signs and symptoms discussed.  Discussed kick counts and fetal movement.  Discussed PTL, PROM, and when to call or come in.  RTC 1 week.    Sixto Jones MD

## 2022-08-25 ENCOUNTER — PRENATAL OFFICE VISIT (OUTPATIENT)
Dept: OBGYN | Facility: CLINIC | Age: 32
End: 2022-08-25
Payer: COMMERCIAL

## 2022-08-25 VITALS
HEART RATE: 91 BPM | HEIGHT: 63 IN | DIASTOLIC BLOOD PRESSURE: 64 MMHG | SYSTOLIC BLOOD PRESSURE: 102 MMHG | TEMPERATURE: 97.7 F | BODY MASS INDEX: 31.13 KG/M2 | RESPIRATION RATE: 16 BRPM | WEIGHT: 175.7 LBS

## 2022-08-25 DIAGNOSIS — Z34.83 PRENATAL CARE, SUBSEQUENT PREGNANCY IN THIRD TRIMESTER: Primary | ICD-10-CM

## 2022-08-25 PROCEDURE — 87653 STREP B DNA AMP PROBE: CPT | Performed by: OBSTETRICS & GYNECOLOGY

## 2022-08-25 PROCEDURE — 99207 PR PRENATAL VISIT: CPT | Performed by: OBSTETRICS & GYNECOLOGY

## 2022-08-25 NOTE — PROGRESS NOTES
Concerns: feeling well  .  Doing well.  No concerns today.  No vaginal bleeding, LOF, contractions.  No HA, RUQ pain, N/V, visual changes.  Cervix check next visit.  Reportable signs and symptoms discussed.  GBS done today.  Labor precautions discussed.  RTC 1 week.  Prenatal flowsheet information is reviewed.    Sixto Jones MD

## 2022-08-26 LAB — GP B STREP DNA SPEC QL NAA+PROBE: NEGATIVE

## 2022-09-01 ENCOUNTER — PRENATAL OFFICE VISIT (OUTPATIENT)
Dept: OBGYN | Facility: CLINIC | Age: 32
End: 2022-09-01
Payer: COMMERCIAL

## 2022-09-01 VITALS
HEART RATE: 86 BPM | WEIGHT: 174 LBS | SYSTOLIC BLOOD PRESSURE: 106 MMHG | RESPIRATION RATE: 12 BRPM | TEMPERATURE: 98.4 F | BODY MASS INDEX: 30.83 KG/M2 | DIASTOLIC BLOOD PRESSURE: 58 MMHG | HEIGHT: 63 IN

## 2022-09-01 DIAGNOSIS — Z34.83 ENCOUNTER FOR SUPERVISION OF OTHER NORMAL PREGNANCY IN THIRD TRIMESTER: Primary | ICD-10-CM

## 2022-09-01 PROCEDURE — 99207 PR PRENATAL VISIT: CPT | Performed by: OBSTETRICS & GYNECOLOGY

## 2022-09-01 NOTE — PROGRESS NOTES
Concerns:   No vaginal bleeding, LOF, contractions.  No HA, RUQ pain, N/V, visual changes.  Cephalic position confirmed by Leopold maneuvers and cervical exam.  Discussed signs of labor and when to call or come in.  Reportable signs and symptoms discussed.  Labor precautions discussed.  RTC 1 week.    Sixto Jones MD

## 2022-09-01 NOTE — NURSING NOTE
"Initial /58 (BP Location: Right arm, Patient Position: Sitting, Cuff Size: Adult Regular)   Pulse 86   Temp 98.4  F (36.9  C) (Tympanic)   Resp 12   Ht 1.6 m (5' 3\")   Wt 78.9 kg (174 lb)   LMP 12/16/2021   BMI 30.82 kg/m   Estimated body mass index is 30.82 kg/m  as calculated from the following:    Height as of this encounter: 1.6 m (5' 3\").    Weight as of this encounter: 78.9 kg (174 lb). .      "

## 2022-09-08 ENCOUNTER — PRENATAL OFFICE VISIT (OUTPATIENT)
Dept: OBGYN | Facility: CLINIC | Age: 32
End: 2022-09-08
Payer: COMMERCIAL

## 2022-09-08 VITALS
TEMPERATURE: 98.2 F | SYSTOLIC BLOOD PRESSURE: 118 MMHG | BODY MASS INDEX: 31.53 KG/M2 | HEART RATE: 92 BPM | DIASTOLIC BLOOD PRESSURE: 64 MMHG | WEIGHT: 178 LBS

## 2022-09-08 DIAGNOSIS — Z34.93 PRENATAL CARE, THIRD TRIMESTER: Primary | ICD-10-CM

## 2022-09-08 PROCEDURE — 99207 PR PRENATAL VISIT: CPT | Performed by: OBSTETRICS & GYNECOLOGY

## 2022-09-08 NOTE — NURSING NOTE
"Initial /64 (BP Location: Right arm, Patient Position: Chair, Cuff Size: Adult Regular)   Pulse 92   Temp 98.2  F (36.8  C) (Tympanic)   Wt 80.7 kg (178 lb)   LMP 12/16/2021   Breastfeeding No   BMI 31.53 kg/m   Estimated body mass index is 31.53 kg/m  as calculated from the following:    Height as of 9/1/22: 1.6 m (5' 3\").    Weight as of this encounter: 80.7 kg (178 lb). .    Martita Chang MA    "

## 2022-09-08 NOTE — PROGRESS NOTES
CC: Here for routine prenatal visit at 38w0d   HPI: possible FM, no ctx, no LOF, no VB.  No complaints.      PE: /64 (BP Location: Right arm, Patient Position: Chair, Cuff Size: Adult Regular)   Pulse 92   Temp 98.2  F (36.8  C) (Tympanic)   Wt 80.7 kg (178 lb)   LMP 2021   Breastfeeding No   BMI 31.53 kg/m     Doptones: 150s  FH 37  SVE 50/-2     A/P  @ 38w0d normal pregnancy     1. Routine prenatal care.  COVID restrictions and recommendations reviewed including iron supplementation.  NIPT, US  WNL   RTC 1weeks    Adriana Martino MD   2022 10:41 AM

## 2022-09-15 ENCOUNTER — PRENATAL OFFICE VISIT (OUTPATIENT)
Dept: OBGYN | Facility: CLINIC | Age: 32
End: 2022-09-15
Payer: COMMERCIAL

## 2022-09-15 VITALS
DIASTOLIC BLOOD PRESSURE: 58 MMHG | HEART RATE: 84 BPM | BODY MASS INDEX: 30.82 KG/M2 | SYSTOLIC BLOOD PRESSURE: 106 MMHG | WEIGHT: 174 LBS | TEMPERATURE: 97.1 F

## 2022-09-15 DIAGNOSIS — Z34.93 PRENATAL CARE, THIRD TRIMESTER: Primary | ICD-10-CM

## 2022-09-15 PROCEDURE — 99207 PR PRENATAL VISIT: CPT | Performed by: OBSTETRICS & GYNECOLOGY

## 2022-09-15 NOTE — PROGRESS NOTES
C: Here for routine prenatal visit at 39w0d   HPI: possible FM, no ctx, no LOF, no VB.  No complaints.       /58 (BP Location: Right arm, Patient Position: Chair, Cuff Size: Adult Regular)   Pulse 84   Temp 97.1  F (36.2  C) (Tympanic)   Wt 78.9 kg (174 lb)   LMP 2021   Breastfeeding No   BMI 30.82 kg/m     Doptones: 140s  FH 38  SVE 50/-2 soft     A/P  @ 39w0d  normal pregnancy     1. Routine prenatal care.  COVID restrictions and recommendations reviewed including iron supplementation.  NIPT, US  WNL     RTC 1weeks, membrane sweep next visit     Adriana Martino MD   9/15/2022 10:02 AM

## 2022-09-15 NOTE — NURSING NOTE
"Initial /58 (BP Location: Right arm, Patient Position: Chair, Cuff Size: Adult Regular)   Pulse 84   Temp 97.1  F (36.2  C) (Tympanic)   Wt 78.9 kg (174 lb)   LMP 12/16/2021   Breastfeeding No   BMI 30.82 kg/m   Estimated body mass index is 30.82 kg/m  as calculated from the following:    Height as of 9/1/22: 1.6 m (5' 3\").    Weight as of this encounter: 78.9 kg (174 lb). .      Martita Chang MA    "

## 2022-09-22 ENCOUNTER — HOSPITAL ENCOUNTER (INPATIENT)
Facility: CLINIC | Age: 32
LOS: 1 days | Discharge: HOME | End: 2022-09-23
Attending: OBSTETRICS & GYNECOLOGY | Admitting: OBSTETRICS & GYNECOLOGY
Payer: COMMERCIAL

## 2022-09-22 ENCOUNTER — ANESTHESIA (OUTPATIENT)
Dept: OBGYN | Facility: CLINIC | Age: 32
End: 2022-09-22
Payer: COMMERCIAL

## 2022-09-22 ENCOUNTER — ANESTHESIA EVENT (OUTPATIENT)
Dept: OBGYN | Facility: CLINIC | Age: 32
End: 2022-09-22
Payer: COMMERCIAL

## 2022-09-22 PROBLEM — Z34.80 SUPERVISION OF OTHER NORMAL PREGNANCY: Status: RESOLVED | Noted: 2022-09-22 | Resolved: 2022-09-22

## 2022-09-22 PROBLEM — Z34.80 SUPERVISION OF OTHER NORMAL PREGNANCY: Status: ACTIVE | Noted: 2022-09-22

## 2022-09-22 LAB
ABO/RH(D): NORMAL
ANTIBODY SCREEN: NEGATIVE
BASOPHILS # BLD AUTO: 0.1 10E3/UL (ref 0–0.2)
BASOPHILS NFR BLD AUTO: 0 %
EOSINOPHIL # BLD AUTO: 0.4 10E3/UL (ref 0–0.7)
EOSINOPHIL NFR BLD AUTO: 2 %
ERYTHROCYTE [DISTWIDTH] IN BLOOD BY AUTOMATED COUNT: 13.2 % (ref 10–15)
HCT VFR BLD AUTO: 36.6 % (ref 35–47)
HGB BLD-MCNC: 11.8 G/DL (ref 11.7–15.7)
IMM GRANULOCYTES # BLD: 0.2 10E3/UL
IMM GRANULOCYTES NFR BLD: 1 %
LYMPHOCYTES # BLD AUTO: 3.2 10E3/UL (ref 0.8–5.3)
LYMPHOCYTES NFR BLD AUTO: 22 %
MCH RBC QN AUTO: 29.5 PG (ref 26.5–33)
MCHC RBC AUTO-ENTMCNC: 32.2 G/DL (ref 31.5–36.5)
MCV RBC AUTO: 92 FL (ref 78–100)
MONOCYTES # BLD AUTO: 0.9 10E3/UL (ref 0–1.3)
MONOCYTES NFR BLD AUTO: 6 %
NEUTROPHILS # BLD AUTO: 9.7 10E3/UL (ref 1.6–8.3)
NEUTROPHILS NFR BLD AUTO: 69 %
NRBC # BLD AUTO: 0 10E3/UL
NRBC BLD AUTO-RTO: 0 /100
PLATELET # BLD AUTO: 273 10E3/UL (ref 150–450)
RBC # BLD AUTO: 4 10E6/UL (ref 3.8–5.2)
SARS-COV-2 RNA RESP QL NAA+PROBE: NEGATIVE
SPECIMEN EXPIRATION DATE: NORMAL
T PALLIDUM AB SER QL: NONREACTIVE
WBC # BLD AUTO: 14.4 10E3/UL (ref 4–11)

## 2022-09-22 PROCEDURE — 250N000013 HC RX MED GY IP 250 OP 250 PS 637: Performed by: OBSTETRICS & GYNECOLOGY

## 2022-09-22 PROCEDURE — 00HU33Z INSERTION OF INFUSION DEVICE INTO SPINAL CANAL, PERCUTANEOUS APPROACH: ICD-10-PCS | Performed by: NURSE ANESTHETIST, CERTIFIED REGISTERED

## 2022-09-22 PROCEDURE — 59400 OBSTETRICAL CARE: CPT | Performed by: OBSTETRICS & GYNECOLOGY

## 2022-09-22 PROCEDURE — 87635 SARS-COV-2 COVID-19 AMP PRB: CPT | Performed by: OBSTETRICS & GYNECOLOGY

## 2022-09-22 PROCEDURE — 120N000001 HC R&B MED SURG/OB

## 2022-09-22 PROCEDURE — 370N000003 HC ANESTHESIA WARD SERVICE

## 2022-09-22 PROCEDURE — 3E0R3BZ INTRODUCTION OF ANESTHETIC AGENT INTO SPINAL CANAL, PERCUTANEOUS APPROACH: ICD-10-PCS | Performed by: NURSE ANESTHETIST, CERTIFIED REGISTERED

## 2022-09-22 PROCEDURE — 85018 HEMOGLOBIN: CPT | Performed by: OBSTETRICS & GYNECOLOGY

## 2022-09-22 PROCEDURE — 258N000003 HC RX IP 258 OP 636: Performed by: OBSTETRICS & GYNECOLOGY

## 2022-09-22 PROCEDURE — 722N000001 HC LABOR CARE VAGINAL DELIVERY SINGLE

## 2022-09-22 PROCEDURE — 86780 TREPONEMA PALLIDUM: CPT | Performed by: OBSTETRICS & GYNECOLOGY

## 2022-09-22 PROCEDURE — 250N000011 HC RX IP 250 OP 636: Performed by: NURSE ANESTHETIST, CERTIFIED REGISTERED

## 2022-09-22 PROCEDURE — 86901 BLOOD TYPING SEROLOGIC RH(D): CPT | Performed by: OBSTETRICS & GYNECOLOGY

## 2022-09-22 PROCEDURE — 86850 RBC ANTIBODY SCREEN: CPT | Performed by: OBSTETRICS & GYNECOLOGY

## 2022-09-22 RX ORDER — OXYTOCIN 10 [USP'U]/ML
10 INJECTION, SOLUTION INTRAMUSCULAR; INTRAVENOUS
Status: DISCONTINUED | OUTPATIENT
Start: 2022-09-22 | End: 2022-09-22

## 2022-09-22 RX ORDER — NALOXONE HYDROCHLORIDE 0.4 MG/ML
0.2 INJECTION, SOLUTION INTRAMUSCULAR; INTRAVENOUS; SUBCUTANEOUS
Status: DISCONTINUED | OUTPATIENT
Start: 2022-09-22 | End: 2022-09-22

## 2022-09-22 RX ORDER — TRANEXAMIC ACID 10 MG/ML
1 INJECTION, SOLUTION INTRAVENOUS EVERY 30 MIN PRN
Status: DISCONTINUED | OUTPATIENT
Start: 2022-09-22 | End: 2022-09-23 | Stop reason: HOSPADM

## 2022-09-22 RX ORDER — ONDANSETRON 2 MG/ML
4 INJECTION INTRAMUSCULAR; INTRAVENOUS EVERY 6 HOURS PRN
Status: DISCONTINUED | OUTPATIENT
Start: 2022-09-22 | End: 2022-09-22

## 2022-09-22 RX ORDER — IBUPROFEN 800 MG/1
800 TABLET, FILM COATED ORAL
Status: DISCONTINUED | OUTPATIENT
Start: 2022-09-22 | End: 2022-09-22

## 2022-09-22 RX ORDER — OXYTOCIN/0.9 % SODIUM CHLORIDE 30/500 ML
340 PLASTIC BAG, INJECTION (ML) INTRAVENOUS CONTINUOUS PRN
Status: DISCONTINUED | OUTPATIENT
Start: 2022-09-22 | End: 2022-09-23 | Stop reason: HOSPADM

## 2022-09-22 RX ORDER — OXYTOCIN 10 [USP'U]/ML
10 INJECTION, SOLUTION INTRAMUSCULAR; INTRAVENOUS
Status: DISCONTINUED | OUTPATIENT
Start: 2022-09-22 | End: 2022-09-23 | Stop reason: HOSPADM

## 2022-09-22 RX ORDER — OXYTOCIN/0.9 % SODIUM CHLORIDE 30/500 ML
100-340 PLASTIC BAG, INJECTION (ML) INTRAVENOUS CONTINUOUS PRN
Status: DISCONTINUED | OUTPATIENT
Start: 2022-09-22 | End: 2022-09-22

## 2022-09-22 RX ORDER — NALBUPHINE HYDROCHLORIDE 10 MG/ML
2.5-5 INJECTION, SOLUTION INTRAMUSCULAR; INTRAVENOUS; SUBCUTANEOUS EVERY 6 HOURS PRN
Status: DISCONTINUED | OUTPATIENT
Start: 2022-09-22 | End: 2022-09-22

## 2022-09-22 RX ORDER — CITRIC ACID/SODIUM CITRATE 334-500MG
30 SOLUTION, ORAL ORAL
Status: DISCONTINUED | OUTPATIENT
Start: 2022-09-22 | End: 2022-09-22

## 2022-09-22 RX ORDER — NALOXONE HYDROCHLORIDE 0.4 MG/ML
0.4 INJECTION, SOLUTION INTRAMUSCULAR; INTRAVENOUS; SUBCUTANEOUS
Status: DISCONTINUED | OUTPATIENT
Start: 2022-09-22 | End: 2022-09-22

## 2022-09-22 RX ORDER — CARBOPROST TROMETHAMINE 250 UG/ML
250 INJECTION, SOLUTION INTRAMUSCULAR
Status: DISCONTINUED | OUTPATIENT
Start: 2022-09-22 | End: 2022-09-22

## 2022-09-22 RX ORDER — ONDANSETRON 4 MG/1
4 TABLET, ORALLY DISINTEGRATING ORAL EVERY 6 HOURS PRN
Status: DISCONTINUED | OUTPATIENT
Start: 2022-09-22 | End: 2022-09-22

## 2022-09-22 RX ORDER — HYDROCORTISONE 25 MG/G
CREAM TOPICAL 3 TIMES DAILY PRN
Status: DISCONTINUED | OUTPATIENT
Start: 2022-09-22 | End: 2022-09-23 | Stop reason: HOSPADM

## 2022-09-22 RX ORDER — PROCHLORPERAZINE MALEATE 10 MG
10 TABLET ORAL EVERY 6 HOURS PRN
Status: DISCONTINUED | OUTPATIENT
Start: 2022-09-22 | End: 2022-09-22

## 2022-09-22 RX ORDER — SODIUM CHLORIDE, SODIUM LACTATE, POTASSIUM CHLORIDE, CALCIUM CHLORIDE 600; 310; 30; 20 MG/100ML; MG/100ML; MG/100ML; MG/100ML
INJECTION, SOLUTION INTRAVENOUS CONTINUOUS
Status: DISCONTINUED | OUTPATIENT
Start: 2022-09-22 | End: 2022-09-22

## 2022-09-22 RX ORDER — BISACODYL 10 MG
10 SUPPOSITORY, RECTAL RECTAL DAILY PRN
Status: DISCONTINUED | OUTPATIENT
Start: 2022-09-22 | End: 2022-09-23 | Stop reason: HOSPADM

## 2022-09-22 RX ORDER — METHYLERGONOVINE MALEATE 0.2 MG/ML
200 INJECTION INTRAVENOUS
Status: DISCONTINUED | OUTPATIENT
Start: 2022-09-22 | End: 2022-09-23 | Stop reason: HOSPADM

## 2022-09-22 RX ORDER — MISOPROSTOL 200 UG/1
400 TABLET ORAL
Status: DISCONTINUED | OUTPATIENT
Start: 2022-09-22 | End: 2022-09-22

## 2022-09-22 RX ORDER — METHYLERGONOVINE MALEATE 0.2 MG/ML
200 INJECTION INTRAVENOUS
Status: DISCONTINUED | OUTPATIENT
Start: 2022-09-22 | End: 2022-09-22

## 2022-09-22 RX ORDER — OXYTOCIN/0.9 % SODIUM CHLORIDE 30/500 ML
340 PLASTIC BAG, INJECTION (ML) INTRAVENOUS CONTINUOUS PRN
Status: DISCONTINUED | OUTPATIENT
Start: 2022-09-22 | End: 2022-09-22

## 2022-09-22 RX ORDER — ACETAMINOPHEN 325 MG/1
650 TABLET ORAL EVERY 4 HOURS PRN
Status: DISCONTINUED | OUTPATIENT
Start: 2022-09-22 | End: 2022-09-23 | Stop reason: HOSPADM

## 2022-09-22 RX ORDER — MISOPROSTOL 200 UG/1
800 TABLET ORAL
Status: DISCONTINUED | OUTPATIENT
Start: 2022-09-22 | End: 2022-09-23 | Stop reason: HOSPADM

## 2022-09-22 RX ORDER — MODIFIED LANOLIN
OINTMENT (GRAM) TOPICAL
Status: DISCONTINUED | OUTPATIENT
Start: 2022-09-22 | End: 2022-09-23 | Stop reason: HOSPADM

## 2022-09-22 RX ORDER — IBUPROFEN 800 MG/1
800 TABLET, FILM COATED ORAL EVERY 6 HOURS PRN
Status: DISCONTINUED | OUTPATIENT
Start: 2022-09-22 | End: 2022-09-23 | Stop reason: HOSPADM

## 2022-09-22 RX ORDER — LIDOCAINE HYDROCHLORIDE AND EPINEPHRINE 15; 5 MG/ML; UG/ML
3 INJECTION, SOLUTION EPIDURAL
Status: DISCONTINUED | OUTPATIENT
Start: 2022-09-22 | End: 2022-09-22

## 2022-09-22 RX ORDER — TRANEXAMIC ACID 10 MG/ML
1 INJECTION, SOLUTION INTRAVENOUS EVERY 30 MIN PRN
Status: DISCONTINUED | OUTPATIENT
Start: 2022-09-22 | End: 2022-09-22

## 2022-09-22 RX ORDER — KETOROLAC TROMETHAMINE 30 MG/ML
30 INJECTION, SOLUTION INTRAMUSCULAR; INTRAVENOUS
Status: DISCONTINUED | OUTPATIENT
Start: 2022-09-22 | End: 2022-09-22

## 2022-09-22 RX ORDER — MISOPROSTOL 200 UG/1
800 TABLET ORAL
Status: DISCONTINUED | OUTPATIENT
Start: 2022-09-22 | End: 2022-09-22

## 2022-09-22 RX ORDER — PRENATAL VIT/IRON FUM/FOLIC AC 27MG-0.8MG
1 TABLET ORAL DAILY
Status: DISCONTINUED | OUTPATIENT
Start: 2022-09-22 | End: 2022-09-23 | Stop reason: HOSPADM

## 2022-09-22 RX ORDER — FENTANYL CITRATE-0.9 % NACL/PF 10 MCG/ML
100 PLASTIC BAG, INJECTION (ML) INTRAVENOUS EVERY 5 MIN PRN
Status: DISCONTINUED | OUTPATIENT
Start: 2022-09-22 | End: 2022-09-22

## 2022-09-22 RX ORDER — METOCLOPRAMIDE HYDROCHLORIDE 5 MG/ML
10 INJECTION INTRAMUSCULAR; INTRAVENOUS EVERY 6 HOURS PRN
Status: DISCONTINUED | OUTPATIENT
Start: 2022-09-22 | End: 2022-09-22

## 2022-09-22 RX ORDER — LIDOCAINE 40 MG/G
CREAM TOPICAL
Status: DISCONTINUED | OUTPATIENT
Start: 2022-09-22 | End: 2022-09-22

## 2022-09-22 RX ORDER — PROCHLORPERAZINE 25 MG
25 SUPPOSITORY, RECTAL RECTAL EVERY 12 HOURS PRN
Status: DISCONTINUED | OUTPATIENT
Start: 2022-09-22 | End: 2022-09-22

## 2022-09-22 RX ORDER — MISOPROSTOL 200 UG/1
400 TABLET ORAL
Status: DISCONTINUED | OUTPATIENT
Start: 2022-09-22 | End: 2022-09-23 | Stop reason: HOSPADM

## 2022-09-22 RX ORDER — METOCLOPRAMIDE 10 MG/1
10 TABLET ORAL EVERY 6 HOURS PRN
Status: DISCONTINUED | OUTPATIENT
Start: 2022-09-22 | End: 2022-09-22

## 2022-09-22 RX ORDER — CARBOPROST TROMETHAMINE 250 UG/ML
250 INJECTION, SOLUTION INTRAMUSCULAR
Status: DISCONTINUED | OUTPATIENT
Start: 2022-09-22 | End: 2022-09-23 | Stop reason: HOSPADM

## 2022-09-22 RX ORDER — DOCUSATE SODIUM 100 MG/1
100 CAPSULE, LIQUID FILLED ORAL DAILY
Status: DISCONTINUED | OUTPATIENT
Start: 2022-09-22 | End: 2022-09-23 | Stop reason: HOSPADM

## 2022-09-22 RX ADMIN — PRENATAL VITAMINS-IRON FUMARATE 27 MG IRON-FOLIC ACID 0.8 MG TABLET 1 TABLET: at 10:07

## 2022-09-22 RX ADMIN — SODIUM CHLORIDE, POTASSIUM CHLORIDE, SODIUM LACTATE AND CALCIUM CHLORIDE 1000 ML: 600; 310; 30; 20 INJECTION, SOLUTION INTRAVENOUS at 02:27

## 2022-09-22 RX ADMIN — DOCUSATE SODIUM 100 MG: 100 CAPSULE, LIQUID FILLED ORAL at 10:07

## 2022-09-22 RX ADMIN — Medication 100 MCG: at 03:22

## 2022-09-22 RX ADMIN — IBUPROFEN 800 MG: 800 TABLET ORAL at 10:07

## 2022-09-22 RX ADMIN — IBUPROFEN 800 MG: 800 TABLET ORAL at 16:13

## 2022-09-22 RX ADMIN — Medication 10 ML/HR: at 03:11

## 2022-09-22 RX ADMIN — Medication 10 ML/HR: at 03:09

## 2022-09-22 RX ADMIN — SERTRALINE HYDROCHLORIDE 50 MG: 50 TABLET ORAL at 10:07

## 2022-09-22 ASSESSMENT — ACTIVITIES OF DAILY LIVING (ADL)
DIFFICULTY_EATING/SWALLOWING: NO
HEARING_DIFFICULTY_OR_DEAF: NO
ADLS_ACUITY_SCORE: 18
ADLS_ACUITY_SCORE: 18
WALKING_OR_CLIMBING_STAIRS_DIFFICULTY: NO
ADLS_ACUITY_SCORE: 18
ADLS_ACUITY_SCORE: 18
CONCENTRATING,_REMEMBERING_OR_MAKING_DECISIONS_DIFFICULTY: NO
WEAR_GLASSES_OR_BLIND: NO
TOILETING_ISSUES: NO
DRESSING/BATHING_DIFFICULTY: NO
DOING_ERRANDS_INDEPENDENTLY_DIFFICULTY: NO
ADLS_ACUITY_SCORE: 18
ADLS_ACUITY_SCORE: 18
DIFFICULTY_COMMUNICATING: NO
FALL_HISTORY_WITHIN_LAST_SIX_MONTHS: NO
ADLS_ACUITY_SCORE: 18
CHANGE_IN_FUNCTIONAL_STATUS_SINCE_ONSET_OF_CURRENT_ILLNESS/INJURY: NO
ADLS_ACUITY_SCORE: 18
ADLS_ACUITY_SCORE: 18

## 2022-09-22 ASSESSMENT — ENCOUNTER SYMPTOMS: DYSRHYTHMIAS: 1

## 2022-09-22 NOTE — PLAN OF CARE
Pt arrived to [facility] with contractions and ruptured. Clear fluid. SVE 5/90/-1. Pt requesting epidural as soon as possible. Admission completed and pt and spouse oriented to room and unit.

## 2022-09-22 NOTE — ANESTHESIA PREPROCEDURE EVALUATION
Anesthesia Pre-Procedure Evaluation    Patient: Shelly Salmeron   MRN: [id number] : 1990        Procedure :           Past Medical History:   Diagnosis Date     Anxiety      Arrhythmia      Chickenpox      Depressive disorder 2005    Intermittent. Never diagnosed or treated via psych or meds     History of urinary tract infection      Postpartum depression       Past Surgical History:   Procedure Laterality Date     WISDOM TOOTH EXTRACTION        No Known Allergies   Social History     Tobacco Use     Smoking status: Never Smoker     Smokeless tobacco: Never Used   Substance Use Topics     Alcohol use: Not Currently     Comment: occ.-quit with pregnancy      Wt Readings from Last 1 Encounters:   09/15/22 78.9 kg (174 lb)        Anesthesia Evaluation            ROS/MED HX  ENT/Pulmonary:  - neg pulmonary ROS     Neurologic:  - neg neurologic ROS     Cardiovascular:     (+) -----dysrhythmias,     METS/Exercise Tolerance:     Hematologic:  - neg hematologic  ROS     Musculoskeletal:  - neg musculoskeletal ROS     GI/Hepatic:  - neg GI/hepatic ROS     Renal/Genitourinary:  - neg Renal ROS     Endo:  - neg endo ROS     Psychiatric/Substance Use:  - neg psychiatric ROS   (+) psychiatric history depression and anxiety     Infectious Disease:  - neg infectious disease ROS     Malignancy:  - neg malignancy ROS     Other:  - neg other ROS          Physical Exam    Airway  airway exam normal      Mallampati: II   TM distance: > 3 FB   Neck ROM: full   Mouth opening: > 3 cm    Respiratory Devices and Support         Dental  no notable dental history         Cardiovascular   cardiovascular exam normal          Pulmonary   pulmonary exam normal                OUTSIDE LABS:  CBC:   Lab Results   Component Value Date    WBC 14.4 (H) 2022    WBC 11.4 (H) 2022    HGB 11.8 2022    HGB 11.8 2022    HCT 36.6 2022    HCT 36.7 2022     2022     2022     BMP: No  results found for: NA, POTASSIUM, CHLORIDE, CO2, BUN, CR, GLC  COAGS: No results found for: PTT, INR, FIBR  POC:   Lab Results   Component Value Date    HCG Positive (A) 01/06/2020     HEPATIC: No results found for: ALBUMIN, PROTTOTAL, ALT, AST, GGT, ALKPHOS, BILITOTAL, BILIDIRECT, BEVERLEY  OTHER: No results found for: PH, LACT, A1C, PAMELA, PHOS, MAG, LIPASE, AMYLASE, TSH, T4, T3, CRP, SED    Anesthesia Plan    ASA Status:  2      Anesthesia Type: Epidural.              Consents    Anesthesia Plan(s) and associated risks, benefits, and realistic alternatives discussed. Questions answered and patient/representative(s) expressed understanding.     - Discussed: Risks, Benefits and Alternatives for the PROCEDURE were discussed     - Discussed with:  Patient      - Specific Concerns: Infection, bleeding, nerve damage, toxicity, hypotenstion, difficulty breathing, headaches, nausea.        Postoperative Care            Comments:    Other Comments: Dura Puncture Epidural technique discussed             YULIET Turk CRNA

## 2022-09-22 NOTE — ANESTHESIA PROCEDURE NOTES
Dural puncture epidural Procedure Note    Pre-Procedure   Staff -        CRNA: Althea Walls APRN CRNA       Performed By: CRNA       Location: OB       Pre-Anesthestic Checklist: patient identified, IV checked, risks and benefits discussed, informed consent, monitors and equipment checked, pre-op evaluation, at physician/surgeon's request and post-op pain management  Timeout:       Correct Patient: Yes        Correct Procedure: Yes        Correct Site: Yes        Correct Position: Yes   Procedure Documentation  Procedure: dural puncture epidural       Diagnosis: labor analgesia       Skin prep: Chloraprep       Local skin infiltrated with 5 mL of 1% lidocaine.        Insertion Site: L3-4, L2-3. (midline approach).       Technique: LORT saline        SONIA at 5 cm.       Needle Gauge: 17.        Needle Length (Inches): 3.5        Spinal Needle Type: Pencan       Spinal Needle (gauge): 25        Spinal Needle Length (inches): 4.69       Catheter: 19 G.          Catheter threaded easily.           Threaded 10 cm at skin.         # of attempts: 1 and  # of redirects:  0    Assessment/Narrative         Paresthesias: No.       Test dose of 5 mL lidocaine 1.5% w/ 1:200,000 epinephrine at 03:08 CDT.         Test dose negative, 3 minutes after injection, for signs of intravascular, subdural, or intrathecal injection.       Insertion/Infusion Method: LORT saline       Aspiration negative for Heme or CSF via Epidural Catheter.       CSF fluid: with Spinal needle.CSF fluid removed: with Epidural needle - not with Epidural needle.     Comments:  Patient expressed decreased in pain

## 2022-09-22 NOTE — L&D DELIVERY NOTE
Delivery Summary    Shelly Salmeron MRN# [id number]   Age: 31 year old YOB: 1990     ASSESSMENT & PLAN:       Shelly Salmeron is a 31 year old  presented at 40 weeks 0 days who was admitted to L&D with labor. Her pregnancy was uncomplicated. She progressed spontaneously through labor until complete dilation. Patient did not labor down. She pushed effectively and delivered a viable male infant with Apgars 7/9 over an intact perineum. Weight is 8#6oz and skin-to-skin was performed. Delayed cord clamping was performed. Cord was clamped and cut. Placenta delivered via active management and was noted to be intact with a three vessel cord.   QBL 50ml. Sponge and needle counts correct. Mom and baby were transported to postpartum in stable condition.          Jazmine Salmeron [[id number]]    Labor Event Times    Labor onset date: 22 Onset time: 11:00 PM   Dilation complete date: 22 Complete time:  4:15 AM   Start pushing date/time: 2022 0427      Labor Events     labor?: No   steroids: None  Labor Type: Spontaneous  Predominate monitoring during 1st stage: continuous electronic fetal monitoring     Antibiotics received during labor?: No     Rupture date/time: 22 0130   Rupture type: Spontaneous rupture of membranes occuring during spontaneous labor or augmentation  Fluid color: Clear  Fluid odor: Normal     Augmentation: None  1:1 continuous labor support provided by?: RN Labor partogram used?: no      Delivery/Placenta Date and Time    Delivery Date: 22 Delivery Time:  4:41 AM   Placenta Date/Time: 2022  4:48 AM  Oxytocin given at the time of delivery: after delivery of baby  Delivering clinician: Trisha Crawford DO   Other personnel present at delivery:  Provider Role   Jocelyn Loera RN Delivery Nurse   Katlyn Stallings RN Charge Nurse         Vaginal Counts     Initial count performed by 2 team members:  Two Team Members   Jocelyn PIERCE RN    Katlyn MCKEON, CLAUDIA       [place] Suture Needles Sponges (RETIRED) Instruments   Initial counts 2  5    Added to count       Relief counts       Final counts 2  5          Placed during labor Accounted for at the end of labor   FSE     IUPC     Cervidil                Final count performed by 2 team members:  Two Team Members   CLAUDIA Carpenter      Final count correct?: Yes     Apgars    Living status: Living   1 Minute 5 Minute 10 Minute 15 Minute 20 Minute   Skin color: 1  1       Heart rate: 2  2       Reflex irritability: 1  2       Muscle tone: 1  2       Respiratory effort: 2  2       Total: 7  9       Apgars assigned by: LUCRECIA PIERCE RN     Cord    Vessels: 3 Vessels    Cord Complications: None               Cord Blood Disposition: Lab    Gases Sent?: No    Delayed cord clamping?: Yes    Cord Clamping Delay (seconds): 31-60 seconds    Stem cell collection?: No        Measurements    Weight: 8 lb 6.4 oz       Skin to Skin and Feeding Plan    Skin to skin initiation date/time: 1841    Skin to skin with: Mother  Skin to skin end date/time:        Labor Events and Shoulder Dystocia    Fetal Tracing Prior to Delivery: Category 2  Fetal Tracing Comments: Decelerations with pushing  Shoulder dystocia present?: Neg     Delivery (Maternal) (Provider to Complete) ([id number])    Episiotomy: None  Perineal lacerations: None    Est. blood loss (mL): 50  Repair suture: None  Genital tract inspection done: Pos     Blood Loss  Mother: Shelly Salmeron #[id number]   Start of Mother's Information    Delivery Blood Loss  22 2300 - 22 0505    EBL (mL) Hospital Encounter 50 mL    Total  50 mL         End of Mother's Information  Mother: Shelly Salmeron #[id number]          Delivery - Provider to Complete ([id number])    Delivering clinician: Trisha Crawford DO  Delivery Type (Choose the 1 that will go to the Birth History): Vaginal, Spontaneous                   Other personnel:  Provider Role   Ronnie Feliciano  Jocelyn PIZANO RN Delivery Nurse   Katlyn Stallings RN Charge Nurse                Placenta    Date/Time: 9/22/2022  4:48 AM  Removal: Spontaneous  Disposition: Hospital disposal           Anesthesia    Method: Epidural  Cervical dilation at placement: 4-7                Presentation and Position    Presentation: Vertex    Position: Left Occiput Anterior                 Trisha Crawford DO

## 2022-09-22 NOTE — ANESTHESIA POSTPROCEDURE EVALUATION
Patient: Shelly Salmeron    Procedure: * No procedures listed *       Anesthesia Type:  Epidural    Note:  Disposition: Inpatient   Postop Pain Control: Uneventful            Sign Out: Well controlled pain   PONV: No   Neuro/Psych: Uneventful            Sign Out: Acceptable/Baseline neuro status   Airway/Respiratory: Uneventful            Sign Out: Acceptable/Baseline resp. status   CV/Hemodynamics: Uneventful            Sign Out: Acceptable CV status; No obvious hypovolemia; No obvious fluid overload   Other NRE: NONE   DID A NON-ROUTINE EVENT OCCUR? No           Last vitals:  Vitals:    09/22/22 0601 09/22/22 0616 09/22/22 0631   BP: 105/55 105/59 100/56   Pulse:      Resp: 16     Temp: 36.9  C (98.5  F)     SpO2:          Electronically Signed By: YULIET Barros CRNA  September 22, 2022  7:28 AM

## 2022-09-22 NOTE — PLAN OF CARE
S:Delivery  B:Spontaneous Labor,40w0d    No results found for: GBS with antibiotic treatment not indicated 4 hours prior to delivery.  A: Patient delivered   none, intact at 0441 with Dr. TYLER Crawford in attendance and baby placed on mother's abdomen for delayed cord clamping. Baby dried and stimulated. Baby placed  skin to skin @ 0441.. Apgars 7/9.Delivery QBL 50.  IV infusion of Oxytocin  infused. Placenta removal spontaneous. MD does not want placenta sent to pathology.  See Flowsheet for VS and PP checks.  .  Labor care plan goals met, transition now to postpartum care. Postpartum QBL TBD  R: Expect routine postpartum care. Anticipate first feeding within the hour or whenever infant displays feeding cues. Continue skin to skin. Prior discussion with mother indicates that feeding plan is Breast feeding . Educated mother on importance of exclusive breastfeeding, expected feeding readiness cues and encouraged her to observe for these cues while rooming in. Informed her that breastfeeding assistance would be provided.

## 2022-09-22 NOTE — H&P
[facility] OB/GYN Department    History and Physical Note    Shelly Salmeron  1990  [id number]    HPI: Shelly Salmeron is a 31 year old  at 40w0d, who presents with complaint of labor. Reports contractions starting tonight around 23:30 and increasing in frequency and intensity. Water broke around 1am. Reports good fetal movement. No vaginal bleeding.     Pregnancy notable for:  COVID in pregnancy    OBHX:   OB History    Para Term  AB Living   2 1 1 0 0 1   SAB IAB Ectopic Multiple Live Births   0 0 0 0 1      # Outcome Date GA Lbr John/2nd Weight Sex Delivery Anes PTL Lv   2 Current            1 Term 20 39w3d 13:15 / 00:50 3.68 kg (8 lb 1.8 oz) M Vag-Spont Local, EPI N JUSTICE      Name: Hipolito      Apgar1: 9  Apgar5: 9       MedicalHX:   Past Medical History:   Diagnosis Date     Anxiety      Arrhythmia      Chickenpox      Depressive disorder 2005    Intermittent. Never diagnosed or treated via psych or meds     History of urinary tract infection      Postpartum depression        SurgicalHX:   Past Surgical History:   Procedure Laterality Date     WISDOM TOOTH EXTRACTION         Medications:   No current facility-administered medications on file prior to encounter.  Prenatal Vit-Fe Fumarate-FA (PRENATAL MULTIVITAMIN W/IRON) 27-0.8 MG tablet, Take 1 tablet by mouth daily  sertraline (ZOLOFT) 50 MG tablet, Take 1 tablet (50 mg) by mouth daily        Allergies:  No Known Allergies    FamilyHX:  Family History   Problem Relation Age of Onset     Breast Cancer Mother 45     Hyperlipidemia Mother      Diabetes Father         type I     Depression Sister      Breast Cancer Maternal Grandmother      Prostate Cancer Maternal Grandfather      Skin Cancer Maternal Grandfather      Hypertension Maternal Uncle      Depression Cousin      Breast Cancer Other         Mothers Sister, 2 Aunts, 3 cousins     Other Cancer Maternal Uncle         Mothers Twin, Pancreatic Cancer      Cerebrovascular Disease No family hx of        SocialHX:   Social History     Socioeconomic History     Marital status:    Tobacco Use     Smoking status: Never Smoker     Smokeless tobacco: Never Used   Vaping Use     Vaping Use: Never used   Substance and Sexual Activity     Alcohol use: Not Currently     Comment: occ.-quit with pregnancy     Drug use: Never     Sexual activity: Yes     Partners: Male     Birth control/protection: Condom   Other Topics Concern     Parent/sibling w/ CABG, MI or angioplasty before 65F 55M? No       ROS: 10-point ROS negative except as in HPI     Physical Exam:  Vitals:    22 0213   BP: 116/63   BP Location: Left arm   Patient Position: Semi-Ba's   Cuff Size: Adult Regular   Pulse: 82   Resp: 18   Temp: 97.6  F (36.4  C)   TempSrc: Oral     GEN: resting comfortably in bed, NAD   CV: No heaves or thrills. No peripheral varicosities  PULM: Equal expansion bilaterally, no accessory muscle use  ABD: soft, gravid, non-tender, non-distended  EXT: no edema, non-tender to palpation  SVE: 8/90/0 per RN  Presentation: Cephalic  Membranes: SROM at 01:00    NST:  FHT: baseline 115 bpm, moderate variability, + accels, two late decels  TOCO: q2-3min    Labs:        Lab Results   Component Value Date    AS Negative 2022    HEPBANG Nonreactive 2022    HGB 11.8 2022       GBS Status:   Negative    A/P: Shelly Salmeron is a 31 year old female  at 40w0d, here for labor.    Admit to L&D. Place PIV. Admission labs. COVID negative.  Labor: Expectant management   FHT: Category 1 FHT.  Continue EFM and toco  Pain: Analgesia PRN, epidural placed  GBS:   negative    Trisha Crawford DO

## 2022-09-23 VITALS
SYSTOLIC BLOOD PRESSURE: 100 MMHG | RESPIRATION RATE: 16 BRPM | OXYGEN SATURATION: 96 % | BODY MASS INDEX: 29.87 KG/M2 | WEIGHT: 168.6 LBS | DIASTOLIC BLOOD PRESSURE: 51 MMHG | TEMPERATURE: 97.1 F | HEART RATE: 60 BPM

## 2022-09-23 PROCEDURE — 250N000013 HC RX MED GY IP 250 OP 250 PS 637: Performed by: OBSTETRICS & GYNECOLOGY

## 2022-09-23 RX ADMIN — IBUPROFEN 800 MG: 800 TABLET ORAL at 04:39

## 2022-09-23 RX ADMIN — IBUPROFEN 800 MG: 800 TABLET ORAL at 10:33

## 2022-09-23 RX ADMIN — SERTRALINE HYDROCHLORIDE 50 MG: 50 TABLET ORAL at 09:22

## 2022-09-23 RX ADMIN — ACETAMINOPHEN 650 MG: 325 TABLET, FILM COATED ORAL at 04:39

## 2022-09-23 RX ADMIN — DOCUSATE SODIUM 100 MG: 100 CAPSULE, LIQUID FILLED ORAL at 09:22

## 2022-09-23 RX ADMIN — PRENATAL VITAMINS-IRON FUMARATE 27 MG IRON-FOLIC ACID 0.8 MG TABLET 1 TABLET: at 09:23

## 2022-09-23 RX ADMIN — ACETAMINOPHEN 650 MG: 325 TABLET, FILM COATED ORAL at 09:22

## 2022-09-23 ASSESSMENT — ACTIVITIES OF DAILY LIVING (ADL)
ADLS_ACUITY_SCORE: 18

## 2022-09-23 NOTE — DISCHARGE INSTRUCTIONS
Postpartum Vaginal Delivery Instructions    Activity     Ask family and friends for help when you need it.  Do not place anything in your vagina for 6 weeks.  You are not restricted on other activities, but take it easy for a few weeks to allow your body to recover from delivery.  You are able to do any activities you feel up to that point.  No driving until you have stopped taking your pain medications (usually two weeks after delivery).     Call your health care provider if you have any of these symptoms:     Increased pain, swelling, redness, or fluid around your stiches from an episiotomy or perineal tear.  A fever above 100.4 F (38 C) with or without chills when placing a thermometer under your tongue.  You soak a sanitary pad with blood within 1 hour, or you see blood clots larger than a golf ball.  Bleeding that lasts more than 6 weeks.  Vaginal discharge that smells bad.  Severe pain, cramping or tenderness in your lower belly area.  A need to urinate more frequently (use the toilet more often), more urgently (use the toilet very quickly), or it burns when you urinate.  Nausea and vomiting.  Redness, swelling or pain around a vein in your leg.  Problems breastfeeding or a red or painful area on your breast.  Chest pain and cough or are gasping for air.  Problems coping with sadness, anxiety, or depression.  If you have any concerns about hurting yourself or the baby, call your provider immediately.   You have questions or concerns after you return home.     Keep your hands clean:  Always wash your hands before touching your perineal area and stitches.  This helps reduce your risk of infection.  If your hands aren't dirty, you may use an alcohol hand-rub to clean your hands. Keep your nails clean and short.      Discharge/Disposition:  Shelly Salmeron was discharged to home in stable condition with the following instructions/medications:  1) Call for temperature > 100.4, foul smelling vaginal discharge,  bleeding > 1 pad per hour x 2 hrs, pain not controlled by oral pain meds, severe constipation or severe nausea or vomiting.  2) She received contraceptive counseling.  3) She was instructed to follow-up with her primary OB in 6 weeks for a routine postpartum visit.  4) She was instructed to continue her PNV on discharge if she wished to breast feed her infant.  5) Discharge instructions: reg diet, pelvic rest for 6 weeks   6) She was discharged home with the following medications:

## 2022-09-23 NOTE — PLAN OF CARE
Data: Vital signs within normal limits. Postpartum checks within normal limits - see flow record. Patient  Is tolerating po intake. Patient is able to empty bladder independently. . Patient ambulating independently..   No apparent signs of infection. perineum healing well. Patient Is performing self cares and Is able to care for infant. Positive attachment behaviors are observed with infant. Support persons are present.  Action:  Pain plan was discussed. Patient will request pain med when she is ready for it. Patient was not medicated during the shift for pain. Patient education done about breastfeeding,  cares, postpartum cares, pain management/plan, rest, and discharge from hospital. See flow record.    Plan: Anticipate discharge on 22.

## 2022-09-23 NOTE — PLAN OF CARE
Patient discharged per ambulatory with infant in car seat. Mother verified that her band matches her infant's band by comparing the infant's  MR#.  Discharge instructions given. Encouraged to call for any problems, questions or concerns. RXs none.

## 2022-09-23 NOTE — PLAN OF CARE
C/O headache/neck ache which she gets when she sleeps wrong.  Given tylenol.  She hopes to go home today.

## 2022-09-23 NOTE — DISCHARGE SUMMARY
[facility] Vaginal Delivery Discharge Summary    Admit date: 2022  Discharge date: 2022     Admit Dx:   - 31 year old y/o  at 40w0d  - spontaneous labor    Discharge Dx:  - Same as above    Procedures:  - spontaneous vaginal delivery     Admit HPI:  Shelly Salmeron is a 31 year old  presented at 40 weeks 0 days who was admitted to L&D with labor. Her pregnancy was uncomplicated. She progressed spontaneously through labor until complete dilation. Patient did not labor down. She pushed effectively and delivered a viable male infant with Apgars 7/9 over an intact perineum. Weight is 8#6oz and skin-to-skin was performed. Delayed cord clamping was performed. Cord was clamped and cut. Placenta delivered via active management and was noted to be intact with a three vessel cord.   QBL 50ml. Sponge and needle counts correct. Mom and baby were transported to postpartum in stable condition.   Please see her admit H&P for full details of her PMH, PSH, Meds, Allergies and exam on admit.    Her postpartum course was uncomplicated. On PPD#1, she was meeting all of her postpartum goals and deemed stable for discharge. She was voiding without difficulty, tolerating a regular diet without nausea and vomiting, her pain was well controlled on oral pain medicines and her lochia was appropriate. Her Rh status was POS and Rhogam was not indicated. At the time of discharge, she was breast feeding her infant.    Physical exam on the day of discharge:  Vitals:    22 0220 22 0435 22 0930 22 1010   BP: 98/56 106/68 100/51    BP Location: Left arm      Patient Position: Semi-Ba's      Cuff Size: Adult Regular      Pulse: 65 72 60    Resp: 16 16 16    Temp: 98.2  F (36.8  C) 97.9  F (36.6  C) 97.1  F (36.2  C)    TempSrc: Oral Oral Oral    SpO2: 98% 96%     Weight:    76.5 kg (168 lb 9.6 oz)       General: sitting up, alert and cooperative  Abd: soft, non-distended,  non-tender. Fundus firm, nontender, 5 cm below umbilicus.   Extremities: calves nontender, no edema of lower extremities bilaterally    Lab Results   Component Value Date    HGB 11.8 09/22/2022    HGB 11.8 06/06/2022     Discharge/Disposition:  Shelly Salmeron was discharged to home in stable condition with the following instructions/medications:  1) Call for temperature > 100.4, foul smelling vaginal discharge, bleeding > 1 pad per hour x 2 hrs, pain not controlled by oral pain meds, severe constipation or severe nausea or vomiting.  2) She received contraceptive counseling.  3) She was instructed to follow-up with her primary OB in 6 weeks for a routine postpartum visit.  4) She was instructed to continue her PNV on discharge if she wished to breast feed her infant.  5) Discharge instructions: reg diet, pelvic rest for 6 weeks   6) She was discharged home with the following medications:      Review of your medicines      CONTINUE these medicines which have NOT CHANGED      Dose / Directions   prenatal multivitamin w/iron 27-0.8 MG tablet      Dose: 1 tablet  Take 1 tablet by mouth daily  Refills: 0     sertraline 50 MG tablet  Commonly known as: ZOLOFT  Used for: Depression with anxiety      Dose: 50 mg  Take 1 tablet (50 mg) by mouth daily  Quantity: 90 tablet  Refills: 3            Althea Jurado MD  [facility] OB/[facility]yn

## 2022-10-21 ENCOUNTER — MEDICAL CORRESPONDENCE (OUTPATIENT)
Dept: HEALTH INFORMATION MANAGEMENT | Facility: CLINIC | Age: 32
End: 2022-10-21

## 2023-01-07 ENCOUNTER — HEALTH MAINTENANCE LETTER (OUTPATIENT)
Age: 33
End: 2023-01-07

## 2023-06-26 ASSESSMENT — ANXIETY QUESTIONNAIRES
IF YOU CHECKED OFF ANY PROBLEMS ON THIS QUESTIONNAIRE, HOW DIFFICULT HAVE THESE PROBLEMS MADE IT FOR YOU TO DO YOUR WORK, TAKE CARE OF THINGS AT HOME, OR GET ALONG WITH OTHER PEOPLE: SOMEWHAT DIFFICULT
GAD7 TOTAL SCORE: 4
3. WORRYING TOO MUCH ABOUT DIFFERENT THINGS: SEVERAL DAYS
4. TROUBLE RELAXING: NOT AT ALL
1. FEELING NERVOUS, ANXIOUS, OR ON EDGE: SEVERAL DAYS
5. BEING SO RESTLESS THAT IT IS HARD TO SIT STILL: NOT AT ALL
6. BECOMING EASILY ANNOYED OR IRRITABLE: SEVERAL DAYS
7. FEELING AFRAID AS IF SOMETHING AWFUL MIGHT HAPPEN: NOT AT ALL
8. IF YOU CHECKED OFF ANY PROBLEMS, HOW DIFFICULT HAVE THESE MADE IT FOR YOU TO DO YOUR WORK, TAKE CARE OF THINGS AT HOME, OR GET ALONG WITH OTHER PEOPLE?: SOMEWHAT DIFFICULT
GAD7 TOTAL SCORE: 4
GAD7 TOTAL SCORE: 4
2. NOT BEING ABLE TO STOP OR CONTROL WORRYING: SEVERAL DAYS
7. FEELING AFRAID AS IF SOMETHING AWFUL MIGHT HAPPEN: NOT AT ALL

## 2023-06-26 ASSESSMENT — ENCOUNTER SYMPTOMS
ARTHRALGIAS: 1
DYSURIA: 0
EYE PAIN: 0
HEMATOCHEZIA: 0
CHILLS: 0
PARESTHESIAS: 0
FEVER: 0
NAUSEA: 0
SORE THROAT: 0
DIARRHEA: 0
PALPITATIONS: 0
WEAKNESS: 0
CONSTIPATION: 0
ABDOMINAL PAIN: 0
COUGH: 0
HEMATURIA: 0
HEARTBURN: 0
BREAST MASS: 0
FREQUENCY: 0
JOINT SWELLING: 0
DIZZINESS: 0
MYALGIAS: 1
SHORTNESS OF BREATH: 0
NERVOUS/ANXIOUS: 1
HEADACHES: 0

## 2023-06-26 ASSESSMENT — PATIENT HEALTH QUESTIONNAIRE - PHQ9
SUM OF ALL RESPONSES TO PHQ QUESTIONS 1-9: 2
10. IF YOU CHECKED OFF ANY PROBLEMS, HOW DIFFICULT HAVE THESE PROBLEMS MADE IT FOR YOU TO DO YOUR WORK, TAKE CARE OF THINGS AT HOME, OR GET ALONG WITH OTHER PEOPLE: SOMEWHAT DIFFICULT
SUM OF ALL RESPONSES TO PHQ QUESTIONS 1-9: 2

## 2023-06-27 ENCOUNTER — OFFICE VISIT (OUTPATIENT)
Dept: FAMILY MEDICINE | Facility: CLINIC | Age: 33
End: 2023-06-27
Payer: COMMERCIAL

## 2023-06-27 ENCOUNTER — PATIENT OUTREACH (OUTPATIENT)
Dept: ONCOLOGY | Facility: CLINIC | Age: 33
End: 2023-06-27

## 2023-06-27 VITALS
OXYGEN SATURATION: 97 % | TEMPERATURE: 97 F | SYSTOLIC BLOOD PRESSURE: 98 MMHG | HEART RATE: 87 BPM | WEIGHT: 151.8 LBS | BODY MASS INDEX: 26.9 KG/M2 | HEIGHT: 63 IN | DIASTOLIC BLOOD PRESSURE: 62 MMHG | RESPIRATION RATE: 16 BRPM

## 2023-06-27 DIAGNOSIS — Z12.31 ENCOUNTER FOR SCREENING MAMMOGRAM FOR BREAST CANCER: ICD-10-CM

## 2023-06-27 DIAGNOSIS — Z80.3 FAMILY HISTORY OF MALIGNANT NEOPLASM OF BREAST: ICD-10-CM

## 2023-06-27 DIAGNOSIS — Z00.00 ROUTINE GENERAL MEDICAL EXAMINATION AT A HEALTH CARE FACILITY: Primary | ICD-10-CM

## 2023-06-27 DIAGNOSIS — F41.8 DEPRESSION WITH ANXIETY: ICD-10-CM

## 2023-06-27 PROCEDURE — 99213 OFFICE O/P EST LOW 20 MIN: CPT | Mod: 25 | Performed by: NURSE PRACTITIONER

## 2023-06-27 PROCEDURE — 99395 PREV VISIT EST AGE 18-39: CPT | Mod: 25 | Performed by: NURSE PRACTITIONER

## 2023-06-27 RX ORDER — SERTRALINE HYDROCHLORIDE 100 MG/1
100 TABLET, FILM COATED ORAL DAILY
Qty: 90 TABLET | Refills: 3 | Status: SHIPPED | OUTPATIENT
Start: 2023-06-27 | End: 2024-05-07

## 2023-06-27 ASSESSMENT — ENCOUNTER SYMPTOMS
WEAKNESS: 0
FREQUENCY: 0
CHILLS: 0
ARTHRALGIAS: 1
COUGH: 0
HEARTBURN: 0
MYALGIAS: 1
SORE THROAT: 0
NAUSEA: 0
BREAST MASS: 0
FEVER: 0
ABDOMINAL PAIN: 0
HEADACHES: 0
PALPITATIONS: 0
JOINT SWELLING: 0
CONSTIPATION: 0
PARESTHESIAS: 0
NERVOUS/ANXIOUS: 1
DYSURIA: 0
HEMATOCHEZIA: 0
DIZZINESS: 0
HEMATURIA: 0
SHORTNESS OF BREATH: 0
DIARRHEA: 0
EYE PAIN: 0

## 2023-06-27 ASSESSMENT — PAIN SCALES - GENERAL: PAINLEVEL: NO PAIN (0)

## 2023-06-27 NOTE — PROGRESS NOTES
SUBJECTIVE:   CC: Shelly is an 32 year old who presents for preventive health visit.       6/27/2023    12:53 PM   Additional Questions   Roomed by Tigist [facility] CMA   Accompanied by self     Healthy Habits:     Getting at least 3 servings of Calcium per day:  Yes    Bi-annual eye exam:  NO    Dental care twice a year:  NO    Sleep apnea or symptoms of sleep apnea:  None    Diet:  Regular (no restrictions)    Frequency of exercise:  None    Taking medications regularly:  Yes    Medication side effects:  None    PHQ-2 Total Score: 0    Additional concerns today:  Yes      Patient's mom had breast cancer in her early 40's, grandma also, in her 70-80's. Along with several other women in her family and she would like to have a mammogram as a precaution.    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 Worsened     How are you doing with your anxiety since your last visit?  Worsened     Are you having other symptoms that might be associated with depression or anxiety? Yes:  less energy, irritable and down more often, overwhelmed more easily    Have you had a significant life event? No     Do you have any concerns with your use of alcohol or other drugs? No     Was feeling better after quitting her job, and now the last 3-6 months it's been getting worse    Social History     Tobacco Use     Smoking status: Never     Smokeless tobacco: Never   Vaping Use     Vaping Use: Never used   Substance Use Topics     Alcohol use: Yes     Comment: occ.-quit with pregnancy     Drug use: Never         6/4/2021     4:00 PM 4/15/2022    11:14 AM 6/26/2023     1:53 PM   PHQ   PHQ-9 Total Score 11 2 2   Q9: Thoughts of better off dead/self-harm past 2 weeks More than half the days Not at all Not at all         5/17/2021     8:17 AM 6/26/2023     1:54 PM   GERARDO-7 SCORE    Total Score  4 (minimal anxiety)   Total Score 1 4         6/26/2023     1:53 PM   Last PHQ-9   1.  Little interest or pleasure in doing things 0   2.  Feeling down, depressed, or hopeless 0   3.  Trouble falling or staying asleep, or sleeping too much 0   4.  Feeling tired or having little energy 1   5.  Poor appetite or overeating 0   6.  Feeling bad about yourself 0   7.  Trouble concentrating 1   8.  Moving slowly or restless 0   Q9: Thoughts of better off dead/self-harm past 2 weeks 0   PHQ-9 Total Score 2         6/26/2023     1:54 PM   GERARDO-7    1. Feeling nervous, anxious, or on edge 1   2. Not being able to stop or control worrying 1   3. Worrying too much about different things 1   4. Trouble relaxing 0   5. Being so restless that it is hard to sit still 0   6. Becoming easily annoyed or irritable 1   7. Feeling afraid, as if something awful might happen 0   GERARDO-7 Total Score 4   If you checked any problems, how difficult have they made it for you to do your work, take care of things at home, or get along with other people? Somewhat difficult       Suicide Assessment Five-step Evaluation and Treatment (SAFE-T)          Social History     Tobacco Use     Smoking status: Never     Smokeless tobacco: Never   Substance Use Topics     Alcohol use: Yes     Comment: occ.-quit with pregnancy             6/26/2023     1:56 PM   Alcohol Use   Prescreen: >3 drinks/day or >7 drinks/week? No     Reviewed orders with patient.  Reviewed health maintenance and updated orders accordingly - Yes  Lab work is in process  Labs reviewed in EPIC  BP Readings from Last 3 Encounters:   06/27/23 98/62   09/23/22 100/51   09/15/22 106/58    Wt Readings from Last 3 Encounters:   06/27/23 68.9 kg (151 lb 12.8 oz)   09/23/22 76.5 kg (168 lb 9.6 oz)   09/15/22 78.9 kg (174 lb)                  Patient Active Problem List   Diagnosis     Other atopic dermatitis     Prenatal care, subsequent pregnancy in third trimester     Depression  with anxiety      (spontaneous vaginal delivery)     Past Surgical History:   Procedure Laterality Date     WISDOM TOOTH EXTRACTION         Social History     Tobacco Use     Smoking status: Never     Smokeless tobacco: Never   Substance Use Topics     Alcohol use: Yes     Comment: occ.-quit with pregnancy     Family History   Problem Relation Age of Onset     Breast Cancer Mother 42     Hyperlipidemia Mother      Diabetes Father         Type 1     Depression Sister      Breast Cancer Maternal Grandmother      Prostate Cancer Maternal Grandfather      Skin Cancer Maternal Grandfather      Hypertension Maternal Uncle      Other Cancer Maternal Uncle         Mothers Twin, Pancreatic Cancer     Depression Cousin      Breast Cancer Other         Mothers Sister, 2 Aunts, 3 cousins     Cerebrovascular Disease No family hx of          Current Outpatient Medications   Medication Sig Dispense Refill     Prenatal Vit-Fe Fumarate-FA (PRENATAL MULTIVITAMIN W/IRON) 27-0.8 MG tablet Take 1 tablet by mouth daily       sertraline (ZOLOFT) 50 MG tablet Take 1 tablet (50 mg) by mouth daily 90 tablet 3     No Known Allergies  No lab results found.     Breast Cancer Screening:    FHS-7:        No data to display                fam hx of breast cancer. early screen prior age 40 recommended  Pertinent mammograms are reviewed under the imaging tab.    History of abnormal Pap smear: NO - age 30-65 PAP every 5 years with negative HPV co-testing recommended      Latest Ref Rng & Units 2021     8:48 AM 2021     8:15 AM 2018     4:31 PM   PAP / HPV   PAP Negative for squamous intraepithelial lesion or malignancy.   Negative for squamous intraepithelial lesion or malignancy  Electronically signed by Audie Cooper CT (ASCP) on 2018 at 10:28 AM      PAP (Historical)  NIL      HPV 16 DNA NEG^Negative  Negative     HPV 18 DNA NEG^Negative  Negative     Other HR HPV NEG^Negative  Negative       Reviewed and updated  as needed this visit by clinical staff   Tobacco  Allergies  Meds  Problems  Med Hx  Surg Hx  Fam Hx          Reviewed and updated as needed this visit by Provider                 Past Medical History:   Diagnosis Date     Anxiety      Arrhythmia      Chickenpox      Depressive disorder 2005    Intermittent. Never diagnosed or treated via psych or meds     History of urinary tract infection      Postpartum depression       Past Surgical History:   Procedure Laterality Date     WISDOM TOOTH EXTRACTION       OB History    Para Term  AB Living   2 2 2 0 0 2   SAB IAB Ectopic Multiple Live Births   0 0 0 0 2      # Outcome Date GA Lbr John/2nd Weight Sex Delivery Anes PTL Lv   2 Term 22 40w0d 05:15 / 00:26 3.81 kg (8 lb 6.4 oz) M Vag-Spont EPI N JUSTICE      Name: THERESA CHINCHILLA-TIMA      Apgar1: 7  Apgar5: 9   1 Term 20 39w3d 13:15 / 00:50 3.68 kg (8 lb 1.8 oz) M Vag-Spont Local, EPI N JUSTICE      Name: Hipolito      Apgar1: 9  Apgar5: 9       Review of Systems   Constitutional: Negative for chills and fever.   HENT: Negative for congestion, ear pain, hearing loss and sore throat.    Eyes: Negative for pain and visual disturbance.   Respiratory: Negative for cough and shortness of breath.    Cardiovascular: Negative for chest pain, palpitations and peripheral edema.   Gastrointestinal: Negative for abdominal pain, constipation, diarrhea, heartburn, hematochezia and nausea.   Breasts:  Negative for tenderness, breast mass and discharge.   Genitourinary: Negative for dysuria, frequency, genital sores, hematuria, pelvic pain, urgency, vaginal bleeding and vaginal discharge.   Musculoskeletal: Positive for arthralgias and myalgias. Negative for joint swelling.   Skin: Negative for rash.   Neurological: Negative for dizziness, weakness, headaches and paresthesias.   Psychiatric/Behavioral: Negative for mood changes. The patient is nervous/anxious.       OBJECTIVE:   BP 98/62 (BP Location: Right  "arm, Patient Position: Sitting, Cuff Size: Adult Regular)   Pulse 87   Temp 97  F (36.1  C) (Tympanic)   Resp 16   Ht 1.6 m (5' 3\")   Wt 68.9 kg (151 lb 12.8 oz)   SpO2 97%   Breastfeeding Yes   BMI 26.89 kg/m       Physical Exam  GENERAL: healthy, alert and no distress  EYES: Eyes grossly normal to inspection, PERRL and conjunctivae and sclerae normal  HENT: ear canals and TM's normal, nose and mouth without ulcers or lesions  NECK: no adenopathy, no asymmetry, masses, or scars and thyroid normal to palpation  RESP: lungs clear to auscultation - no rales, rhonchi or wheezes  BREAST: normal without masses, tenderness or nipple discharge and no palpable axillary masses or adenopathy  CV: regular rate and rhythm, normal S1 S2, no S3 or S4, no murmur, click or rub, no peripheral edema and peripheral pulses strong  ABDOMEN: soft, nontender, no hepatosplenomegaly, no masses and bowel sounds normal  MS: no gross musculoskeletal defects noted, no edema  SKIN: no suspicious lesions or rashes  NEURO: Normal strength and tone, mentation intact and speech normal  PSYCH: mentation appears normal, affect normal/bright        ASSESSMENT/PLAN:       ICD-10-CM    1. Routine general medical examination at a health care facility  Z00.00       2. Depression with anxiety  F41.8 sertraline (ZOLOFT) 100 MG tablet     DISCONTINUED: sertraline (ZOLOFT) 50 MG tablet      3. Family history of malignant neoplasm of breast  Z80.3 MA Screen Bilateral w/Yoshi     Adult Genetics & Metabolism Referral      4. Encounter for screening mammogram for breast cancer  Z12.31 MA Screen Bilateral w/Yoshi     Adult Genetics & Metabolism Referral        Healthy female exam completed today. Discussed lifestyle modifications including weight loss/ management, eating a balanced diet, and getting regular cardiovascular exercise. Discussed self breast exams and how to complete these. Pap Smear up to date. Labs discussed, deferred.     Refill of zoloft, " "continue with current medication, increased to 100mg. Feels generally stable.     Plan follow up with genetics and mammogram with immediate family history of breast cancer.     Plan yearly annual physicals or sooner as needed.    Patient has been advised of split billing requirements and indicates understanding: Yes      COUNSELING:  Reviewed preventive health counseling, as reflected in patient instructions       Regular exercise       Healthy diet/nutrition      BMI:   Estimated body mass index is 26.89 kg/m  as calculated from the following:    Height as of this encounter: 1.6 m (5' 3\").    Weight as of this encounter: 68.9 kg (151 lb 12.8 oz).         She reports that she has never smoked. She has never used smokeless tobacco.      Sary Rivas, YULIET CNP  [facility]  Answers for HPI/ROS submitted by the patient on 6/26/2023  If you checked off any problems, how difficult have these problems made it for you to do your work, take care of things at home, or get along with other people?: Somewhat difficult  PHQ9 TOTAL SCORE: 2  GERARDO 7 TOTAL SCORE: 4      "

## 2023-06-27 NOTE — PROGRESS NOTES
Writer received referral to Cancer Risk Management/Genetic Counseling.    Referred for: mom had breast cancer 41 yo strong additional extended fam hx cancer     Referral reviewed for appropriate plan, and sent to New Patient Scheduling for completion.    Reyna Way, RN, BSN  Oncology New Patient Nurse Navigator   [facility]  [phone]

## 2023-07-18 ENCOUNTER — HOSPITAL ENCOUNTER (OUTPATIENT)
Dept: MAMMOGRAPHY | Facility: CLINIC | Age: 33
Discharge: HOME | End: 2023-07-18
Attending: NURSE PRACTITIONER | Admitting: NURSE PRACTITIONER
Payer: COMMERCIAL

## 2023-07-18 DIAGNOSIS — Z12.31 ENCOUNTER FOR SCREENING MAMMOGRAM FOR BREAST CANCER: ICD-10-CM

## 2023-07-18 DIAGNOSIS — Z80.3 FAMILY HISTORY OF MALIGNANT NEOPLASM OF BREAST: ICD-10-CM

## 2023-07-18 PROCEDURE — 77067 SCR MAMMO BI INCL CAD: CPT

## 2024-04-03 NOTE — PLAN OF CARE
Goal Outcome Evaluation:  Patient making good progress towards PP goals.  C/o some neck and back pain for which she has been taking ibuprofen with adequate relief reported.   PP checks WDL- see flowsheet.  Voiding well, IV removed.  Appetite good.    Bonding well with baby,  present and supportive.  Plan: possible discharge tomorrow if everything checks out.       1

## 2024-05-07 ENCOUNTER — PATIENT OUTREACH (OUTPATIENT)
Dept: ONCOLOGY | Facility: CLINIC | Age: 34
End: 2024-05-07

## 2024-05-07 ENCOUNTER — LAB (OUTPATIENT)
Dept: LAB | Facility: CLINIC | Age: 34
End: 2024-05-07
Payer: COMMERCIAL

## 2024-05-07 ENCOUNTER — OFFICE VISIT (OUTPATIENT)
Dept: FAMILY MEDICINE | Facility: CLINIC | Age: 34
End: 2024-05-07
Payer: COMMERCIAL

## 2024-05-07 VITALS
DIASTOLIC BLOOD PRESSURE: 62 MMHG | OXYGEN SATURATION: 98 % | HEART RATE: 64 BPM | HEIGHT: 63 IN | WEIGHT: 151 LBS | RESPIRATION RATE: 16 BRPM | SYSTOLIC BLOOD PRESSURE: 100 MMHG | BODY MASS INDEX: 26.75 KG/M2 | TEMPERATURE: 97.2 F

## 2024-05-07 DIAGNOSIS — Z00.00 ROUTINE GENERAL MEDICAL EXAMINATION AT A HEALTH CARE FACILITY: Primary | ICD-10-CM

## 2024-05-07 DIAGNOSIS — Z13.0 SCREENING FOR DEFICIENCY ANEMIA: ICD-10-CM

## 2024-05-07 DIAGNOSIS — Z13.1 SCREENING FOR DIABETES MELLITUS: ICD-10-CM

## 2024-05-07 DIAGNOSIS — F41.8 DEPRESSION WITH ANXIETY: ICD-10-CM

## 2024-05-07 DIAGNOSIS — Z13.220 SCREENING FOR HYPERLIPIDEMIA: ICD-10-CM

## 2024-05-07 DIAGNOSIS — Z80.3 FAMILY HISTORY OF MALIGNANT NEOPLASM OF BREAST: ICD-10-CM

## 2024-05-07 LAB
CHOLEST SERPL-MCNC: 270 MG/DL
FASTING STATUS PATIENT QL REPORTED: NO
FASTING STATUS PATIENT QL REPORTED: NO
GLUCOSE SERPL-MCNC: 84 MG/DL (ref 70–99)
HDLC SERPL-MCNC: 69 MG/DL
HGB BLD-MCNC: 14.2 G/DL (ref 11.7–15.7)
LDLC SERPL CALC-MCNC: 183 MG/DL
NONHDLC SERPL-MCNC: 201 MG/DL
TRIGL SERPL-MCNC: 91 MG/DL

## 2024-05-07 PROCEDURE — 99213 OFFICE O/P EST LOW 20 MIN: CPT | Mod: 25 | Performed by: NURSE PRACTITIONER

## 2024-05-07 PROCEDURE — 36415 COLL VENOUS BLD VENIPUNCTURE: CPT

## 2024-05-07 PROCEDURE — 85018 HEMOGLOBIN: CPT

## 2024-05-07 PROCEDURE — 99395 PREV VISIT EST AGE 18-39: CPT | Mod: 25 | Performed by: NURSE PRACTITIONER

## 2024-05-07 PROCEDURE — 82947 ASSAY GLUCOSE BLOOD QUANT: CPT

## 2024-05-07 PROCEDURE — 80061 LIPID PANEL: CPT

## 2024-05-07 RX ORDER — SERTRALINE HYDROCHLORIDE 100 MG/1
100 TABLET, FILM COATED ORAL DAILY
Qty: 90 TABLET | Refills: 3 | Status: SHIPPED | OUTPATIENT
Start: 2024-05-07

## 2024-05-07 SDOH — HEALTH STABILITY: PHYSICAL HEALTH: ON AVERAGE, HOW MANY DAYS PER WEEK DO YOU ENGAGE IN MODERATE TO STRENUOUS EXERCISE (LIKE A BRISK WALK)?: 1 DAY

## 2024-05-07 ASSESSMENT — PATIENT HEALTH QUESTIONNAIRE - PHQ9
SUM OF ALL RESPONSES TO PHQ QUESTIONS 1-9: 3
SUM OF ALL RESPONSES TO PHQ QUESTIONS 1-9: 3
10. IF YOU CHECKED OFF ANY PROBLEMS, HOW DIFFICULT HAVE THESE PROBLEMS MADE IT FOR YOU TO DO YOUR WORK, TAKE CARE OF THINGS AT HOME, OR GET ALONG WITH OTHER PEOPLE: SOMEWHAT DIFFICULT

## 2024-05-07 ASSESSMENT — SOCIAL DETERMINANTS OF HEALTH (SDOH): HOW OFTEN DO YOU GET TOGETHER WITH FRIENDS OR RELATIVES?: ONCE A WEEK

## 2024-05-07 ASSESSMENT — PAIN SCALES - GENERAL: PAINLEVEL: NO PAIN (0)

## 2024-05-07 NOTE — PROGRESS NOTES
Writer received referral to Cancer Risk Management/Genetic Counseling.    Referred for: family hx of breast and ovarian cancer     Referred By    Provider Department Location Phone   Sary Rivas, APRN CNP [facility] [phone]       Referral reviewed for appropriate plan, and sent to New Patient Scheduling ([phone]) for completion.    Reyna Way, RN, BSN  Oncology New Patient Nurse Navigator   [facility]  [phone]

## 2024-05-07 NOTE — PATIENT INSTRUCTIONS
"Preventive Care Advice   This is general advice we often give to help people stay healthy. Your care team may have specific advice just for you. Please talk to your care team about your own preventive care needs.  Lifestyle  Exercise at least 150 minutes each week (30 minutes a day, 5 days a week).  Do muscle strengthening activities 2 days a week. These help control your weight and prevent disease.  No smoking.  Wear sunscreen to prevent skin cancer.  Have your home tested for radon every 2 to 5 years. Radon is a colorless, odorless gas that can harm your lungs. To learn more, go to www.health.[facility].mn. and search for \"Radon in Homes.\"  Keep guns unloaded and locked up in a safe place like a safe or gun vault, or, use a gun lock and hide the keys. Always lock away bullets separately. To learn more, visit [employer].mn.gov and search for \"safe gun storage.\"  Nutrition  Eat 5 or more servings of fruits and vegetables each day.  Try wheat bread, brown rice and whole grain pasta (instead of white bread, rice, and pasta).  Get enough calcium and vitamin D. Check the label on foods and aim for 100% of the RDA (recommended daily allowance).  Regular exams  Have a dental exam and cleaning every 6 months.  See your health care team every year to talk about:  Any changes in your health.  Any medicines your care team has prescribed.  Preventive care, family planning, and ways to prevent chronic diseases.  Shots (vaccines)   HPV shots (up to age 26), if you've never had them before.  Hepatitis B shots (up to age 59), if you've never had them before.  COVID-19 shot: Get this shot when it's due.  Flu shot: Get a flu shot every year.  Tetanus shot: Get a tetanus shot every 10 years.  Pneumococcal, hepatitis A, and RSV shots: Ask your care team if you need these based on your risk.  Shingles shot (for age 50 and up).  General health tests  Diabetes screening:  Starting at age 35, Get screened for diabetes at least every 3 years.  If " you are younger than age 35, ask your care team if you should be screened for diabetes.  Cholesterol test: At age 39, start having a cholesterol test every 5 years, or more often if advised.  Bone density scan (DEXA): At age 50, ask your care team if you should have this scan for osteoporosis (brittle bones).  Hepatitis C: Get tested at least once in your life.  Abdominal aortic aneurysm screening: Talk to your doctor about having this screening if you:  Have ever smoked; and  Are biologically male; and  Are between the ages of 65 and 75.  STIs (sexually transmitted infections)  Before age 24: Ask your care team if you should be screened for STIs.  After age 24: Get screened for STIs if you're at risk. You are at risk for STIs (including HIV) if:  You are sexually active with more than one person.  You don't use condoms every time.  You or a partner was diagnosed with a sexually transmitted infection.  If you are at risk for HIV, ask about PrEP medicine to prevent HIV.  Get tested for HIV at least once in your life, whether you are at risk for HIV or not.  Cancer screening tests  Cervical cancer screening: If you have a cervix, begin getting regular cervical cancer screening tests at age 21. Most people who have regular screenings with normal results can stop after age 65. Talk about this with your provider.  Breast cancer scan (mammogram): If you've ever had breasts, begin having regular mammograms starting at age 40. This is a scan to check for breast cancer.  Colon cancer screening: It is important to start screening for colon cancer at age 45.  Have a colonoscopy test every 10 years (or more often if you're at risk) Or, ask your provider about stool tests like a FIT test every year or Cologuard test every 3 years.  To learn more about your testing options, visit: www.[employer]/173068.pdf.  For help making a decision, visit: andres/dr04324.  Prostate cancer screening test: If you have a prostate and are age 55  to 69, ask your provider if you would benefit from a yearly prostate cancer screening test.  Lung cancer screening: If you are a current or former smoker age 50 to 80, ask your care team if ongoing lung cancer screenings are right for you.  For informational purposes only. Not to replace the advice of your health care provider. Copyright   2023 [employer]. All rights reserved. Clinically reviewed by the [facility] Transitions Program. APROOFED 568284 - REV 04/24.    Learning About Stress  What is stress?     Stress is your body's response to a hard situation. Your body can have a physical, emotional, or mental response. Stress is a fact of life for most people, and it affects everyone differently. What causes stress for you may not be stressful for someone else.  A lot of things can cause stress. You may feel stress when you go on a job interview, take a test, or run a race. This kind of short-term stress is normal and even useful. It can help you if you need to work hard or react quickly. For example, stress can help you finish an important job on time.  Long-term stress is caused by ongoing stressful situations or events. Examples of long-term stress include long-term health problems, ongoing problems at work, or conflicts in your family. Long-term stress can harm your health.  How does stress affect your health?  When you are stressed, your body responds as though you are in danger. It makes hormones that speed up your heart, make you breathe faster, and give you a burst of energy. This is called the fight-or-flight stress response. If the stress is over quickly, your body goes back to normal and no harm is done.  But if stress happens too often or lasts too long, it can have bad effects. Long-term stress can make you more likely to get sick, and it can make symptoms of some diseases worse. If you tense up when you are stressed, you may develop neck, shoulder, or low back pain. Stress is  linked to high blood pressure and heart disease.  Stress also harms your emotional health. It can make you mcnair, tense, or depressed. Your relationships may suffer, and you may not do well at work or school.  What can you do to manage stress?  You can try these things to help manage stress:   Do something active. Exercise or activity can help reduce stress. Walking is a great way to get started. Even everyday activities such as housecleaning or yard work can help.  Try yoga or sharla chi. These techniques combine exercise and meditation. You may need some training at first to learn them.  Do something you enjoy. For example, listen to music or go to a movie. Practice your hobby or do volunteer work.  Meditate. This can help you relax, because you are not worrying about what happened before or what may happen in the future.  Do guided imagery. Imagine yourself in any setting that helps you feel calm. You can use online videos, books, or a teacher to guide you.  Do breathing exercises. For example:  From a standing position, bend forward from the waist with your knees slightly bent. Let your arms dangle close to the floor.  Breathe in slowly and deeply as you return to a standing position. Roll up slowly and lift your head last.  Hold your breath for just a few seconds in the standing position.  Breathe out slowly and bend forward from the waist.  Let your feelings out. Talk, laugh, cry, and express anger when you need to. Talking with supportive friends or family, a counselor, or a amy leader about your feelings is a healthy way to relieve stress. Avoid discussing your feelings with people who make you feel worse.  Write. It may help to write about things that are bothering you. This helps you find out how much stress you feel and what is causing it. When you know this, you can find better ways to cope.  What can you do to prevent stress?  You might try some of these things to help prevent stress:  Manage your time.  "This helps you find time to do the things you want and need to do.  Get enough sleep. Your body recovers from the stresses of the day while you are sleeping.  Get support. Your family, friends, and community can make a difference in how you experience stress.  Limit your news feed. Avoid or limit time on social media or news that may make you feel stressed.  Do something active. Exercise or activity can help reduce stress. Walking is a great way to get started.  Where can you learn more?  Go to https://www.[employer].net/patiented  Enter N032 in the search box to learn more about \"Learning About Stress.\"  Current as of: October 24, 2023               Content Version: 14.0    5995-3872 [employer].   Care instructions adapted under license by your healthcare professional. If you have questions about a medical condition or this instruction, always ask your healthcare professional. [employer] disclaims any warranty or liability for your use of this information.      Learning About Alcohol Use Disorder  What is alcohol use disorder?  Alcohol use disorder means that a person drinks alcohol even though it causes harm to themselves or others. It can range from mild to severe. The more symptoms of this disorder you have, the more severe it may be. People who have it may find it hard to control their use of alcohol.  People who have this disorder may argue with others about how much they're drinking. Their job may be affected because of drinking. They may drink when it's dangerous or illegal, such as when they drive. They also may have a strong need, or craving, to drink. They may feel like they must drink just to get by. Their drinking may increase their risk of getting hurt or being in a car crash.  Over time, drinking too much alcohol may cause health problems. These may include high blood pressure, liver problems, or problems with digestion.  What are the symptoms?  Maybe you've wondered about " your alcohol habits or how to tell if your drinking is becoming a problem.  Here are some of the symptoms of alcohol use disorder. You may have it if you have two or more of the following symptoms:  You drink larger amounts of alcohol than you ever meant to. Or you've been drinking for a longer time than you ever meant to.  You can't cut down or control your use. Or you constantly wish you could cut down.  You spend a lot of time getting or drinking alcohol or recovering from its effects.  You have strong cravings for alcohol.  You can no longer do your main jobs at work, at school, or at home.  You keep drinking alcohol, even though your use hurts your relationships.  You have stopped doing important activities because of your alcohol use.  You drink alcohol in situations where doing so is dangerous.  You keep drinking alcohol even though you know it's causing health problems.  You need more and more alcohol to get the same effect, or you get less effect from the same amount over time. This is called tolerance.  You have uncomfortable symptoms when you stop drinking alcohol or use less. This is called withdrawal.  Alcohol use disorder can range from mild to severe. The more symptoms you have, the more severe the disorder may be.  You might not realize that your drinking is a problem. You might not drink large amounts when you drink. Or you might go for days or weeks between drinking episodes. But even if you don't drink very often, your drinking could still be harmful and put you at risk.  How is alcohol use disorder treated?  Getting help is up to you. But you don't have to do it alone. There are many people and kinds of treatments that can help.  Treatment for alcohol use disorder can include:  Group therapy, one or more types of counseling, and alcohol education.  Medicines that help to:  Reduce withdrawal symptoms and help you safely stop drinking.  Reduce cravings for alcohol.  Support groups. These groups  "include Alcoholics Anonymous and SMART Recovery (Self-Management and Recovery Training).  Some people are able to stop or cut back on drinking with help from a counselor. People who have moderate to severe alcohol use disorder may need medical treatment. They may need to stay in a hospital or treatment center.  You may have a treatment team to help you. This team may include a psychologist or psychiatrist, counselors, doctors, social workers, nurses, and a . A  helps plan and manage your treatment.  Follow-up care is a key part of your treatment and safety. Be sure to make and go to all appointments, and call your doctor if you are having problems. It's also a good idea to know your test results and keep a list of the medicines you take.  Where can you learn more?  Go to https://www.[employer].net/patiented  Enter H758 in the search box to learn more about \"Learning About Alcohol Use Disorder.\"  Current as of: November 15, 2023               Content Version: 14.0    1249-8399 [employer].   Care instructions adapted under license by your healthcare professional. If you have questions about a medical condition or this instruction, always ask your healthcare professional. [employer] disclaims any warranty or liability for your use of this information.      Substance Use Disorder: Care Instructions  Overview     You can improve your life and health by stopping your use of alcohol or drugs. When you don't drink or use drugs, you may feel and sleep better. You may get along better with your family, friends, and coworkers. There are medicines and programs that can help with substance use disorder.  How can you care for yourself at home?  Here are some ways to help you stay sober and prevent relapse.  If you have been given medicine to help keep you sober or reduce your cravings, be sure to take it exactly as prescribed.  Talk to your doctor about programs that can help " you stop using drugs or drinking alcohol.  Do not keep alcohol or drugs in your home.  Plan ahead. Think about what you'll say if other people ask you to drink or use drugs. Try not to spend time with people who drink or use drugs.  Use the time and money spent on drinking or drugs to do something that's important to you.  Preventing a relapse  Have a plan to deal with relapse. Learn to recognize changes in your thinking that lead you to drink or use drugs. Get help before you start to drink or use drugs again.  Try to stay away from situations, friends, or places that may lead you to drink or use drugs.  If you feel the need to drink alcohol or use drugs again, seek help right away. Call a trusted friend or family member. Some people get support from organizations such as Narcotics Anonymous or [employer] or from treatment facilities.  If you relapse, get help as soon as you can. Some people make a plan with another person that outlines what they want that person to do for them if they relapse. The plan usually includes how to handle the relapse and who to notify in case of relapse.  Don't give up. Remember that a relapse doesn't mean that you have failed. Use the experience to learn the triggers that lead you to drink or use drugs. Then quit again. Recovery is a lifelong process. Many people have several relapses before they are able to quit for good.  Follow-up care is a key part of your treatment and safety. Be sure to make and go to all appointments, and call your doctor if you are having problems. It's also a good idea to know your test results and keep a list of the medicines you take.  When should you call for help?   Call 911  anytime you think you may need emergency care. For example, call if you or someone else:    Has overdosed or has withdrawal signs. Be sure to tell the emergency workers that you are or someone else is using or trying to quit using drugs. Overdose or withdrawal signs may  "include:  Losing consciousness.  Seizure.  Seeing or hearing things that aren't there (hallucinations).     Is thinking or talking about suicide or harming others.   Where to get help 24 hours a day, 7 days a week   If you or someone you know talks about suicide, self-harm, a mental health crisis, a substance use crisis, or any other kind of emotional distress, get help right away. You can:    Call the Suicide and Crisis Lifeline at 988.     Call 5-440-707-TALK (1-711.954.5765).     Text HOME to 518596 to access the Crisis Text Line.   Consider saving these numbers in your phone.  Go to [employer] for more information or to chat online.  Call your doctor now or seek immediate medical care if:    You are having withdrawal symptoms. These may include nausea or vomiting, sweating, shakiness, and anxiety.   Watch closely for changes in your health, and be sure to contact your doctor if:    You have a relapse.     You need more help or support to stop.   Where can you learn more?  Go to https://www.[employer].net/patiented  Enter H573 in the search box to learn more about \"Substance Use Disorder: Care Instructions.\"  Current as of: November 15, 2023               Content Version: 14.0    4886-1170 [employer].   Care instructions adapted under license by your healthcare professional. If you have questions about a medical condition or this instruction, always ask your healthcare professional. [employer] disclaims any warranty or liability for your use of this information.      "

## 2024-05-07 NOTE — LETTER
My Depression Action Plan  Name: Shelly Salmeron   Date of Birth 1990  Date: 5/7/2024    My doctor: No Ref-Primary, Physician   My clinic: [facility]  [address]  [facility] [address]  [phone]            GREEN    ZONE   Good Control    What it looks like:   Things are going generally well. You have normal ups and downs. You may even feel depressed from time to time, but bad moods usually last less than a day.   What you need to do:  Continue to care for yourself (see self care plan)  Check your depression survival kit and update it as needed  Follow your physician s recommendations including any medication.  Do not stop taking medication unless you consult with your physician first.             YELLOW         ZONE Getting Worse    What it looks like:   Depression is starting to interfere with your life.   It may be hard to get out of bed; you may be starting to isolate yourself from others.  Symptoms of depression are starting to last most all day and this has happened for several days.   You may have suicidal thoughts but they are not constant.   What you need to do:     Call your care team. Your response to treatment will improve if you keep your care team informed of your progress. Yellow periods are signs an adjustment may need to be made.     Continue your self-care.  Just get dressed and ready for the day.  Don't give yourself time to talk yourself out of it.    Talk to someone in your support network.    Open up your Depression Self-Care Plan/Wellness Kit.             RED    ZONE Medical Alert - Get Help    What it looks like:   Depression is seriously interfering with your life.   You may experience these or other symptoms: You can t get out of bed most days, can t work or engage in other necessary activities, you have trouble taking care of basic hygiene, or basic responsibilities, thoughts of suicide or death that will not go away,  self-injurious behavior.     What you need to do:  Call your care team and request a same-day appointment. If they are not available (weekends or after hours) call your local crisis line, emergency room or 911.          Depression Self-Care Plan / Wellness Kit    Many people find that medication and therapy are helpful treatments for managing depression. In addition, making small changes to your everyday life can help to boost your mood and improve your wellbeing. Below are some tips for you to consider. Be sure to talk with your medical provider and/or behavioral health consultant if your symptoms are worsening or not improving.     Sleep   Sleep hygiene  means all of the habits that support good, restful sleep. It includes maintaining a consistent bedtime and wake time, using your bedroom only for sleeping or sex, and keeping the bedroom dark and free of distractions like a computer, smartphone, or television.     Develop a Healthy Routine  Maintain good hygiene. Get out of bed in the morning, make your bed, brush your teeth, take a shower, and get dressed. Don t spend too much time viewing media that makes you feel stressed. Find time to relax each day.    Exercise  Get some form of exercise every day. This will help reduce pain and release endorphins, the  feel good  chemicals in your brain. It can be as simple as just going for a walk or doing some gardening, anything that will get you moving.      Diet  Strive to eat healthy foods, including fruits and vegetables. Drink plenty of water. Avoid excessive sugar, caffeine, alcohol, and other mood-altering substances.     Stay Connected with Others  Stay in touch with friends and family members.    Manage Your Mood  Try deep breathing, massage therapy, biofeedback, or meditation. Take part in fun activities when you can. Try to find something to smile about each day.     Psychotherapy  Be open to working with a therapist if your provider recommends it.      Medication  Be sure to take your medication as prescribed. Most anti-depressants need to be taken every day. It usually takes several weeks for medications to work. Not all medicines work for all people. It is important to follow-up with your provider to make sure you have a treatment plan that is working for you. Do not stop your medication abruptly without first discussing it with your provider.    Crisis Resources   These hotlines are for both adults and children. They and are open 24 hours a day, 7 days a week unless noted otherwise.    National Suicide Prevention Lifeline   988 or 7-962-925-SOCX (2152)    Crisis Text Line    www.crisistextline.org  Text HOME to 687598 from anywhere in the [place], anytime, about any type of crisis. A live, trained crisis counselor will receive the text and respond quickly.    [place] Lifeline for LGBTQ Youth  A national crisis intervention and suicide lifeline for LGBTQ youth under 25. Provides a safe place to talk without judgement. Call 1-371.220.7304; text START to 172951 or visit www.thetrevorproject.org to talk to a trained counselor.    For [facility] crisis numbers, visit the [facility] website at:  https://[place].gov/dhs/people-we-serve/adults/health-care/mental-health/resources/crisis-contacts.jsp

## 2024-05-07 NOTE — PROGRESS NOTES
"Preventive Care Visit  [facility]  YULIET Witt CNP, Nurse Practitioner - Family  May 7, 2024      Assessment & Plan     Routine general medical examination at a health care facility  Healthy female exam completed today. Discussed lifestyle modifications including weight loss/ management, eating a balanced diet, and getting regular cardiovascular exercise. Discussed self breast exams and how to complete these. Pap Smear up to date. Labs ordered today. Plan yearly annual physicals or sooner as needed.      Depression with anxiety  Doing well currently in remission. This does wax and wane for her. Reviewed her current alcohol use and recommended reducing intake. If having difficulty with this would recommend therapy and potentially naltrexone to help with cravings.   - sertraline (ZOLOFT) 100 MG tablet; Take 1 tablet (100 mg) by mouth daily    Screening for hyperlipidemia  - Lipid panel reflex to direct LDL Non-fasting; Future    Screening for deficiency anemia  - Hemoglobin; Future    Screening for diabetes mellitus  - Glucose; Future    Family history of malignant neoplasm of breast  Significant maternal family history of breast cancer and other family history of numerous types of other cancer.  - Adult Oncology/Hematology  Referral; Future    Patient has been advised of split billing requirements and indicates understanding: Yes        BMI  Estimated body mass index is 26.75 kg/m  as calculated from the following:    Height as of this encounter: 1.6 m (5' 3\").    Weight as of this encounter: 68.5 kg (151 lb).     Counseling  Appropriate preventive services were discussed with this patient, including applicable screening as appropriate for fall prevention, nutrition, physical activity, Tobacco-use cessation, weight loss and cognition.  Checklist reviewing preventive services available has been given to the patient.  Reviewed patient's diet, addressing concerns and/or " "questions.   She is at risk for lack of exercise and has been provided with information to increase physical activity for the benefit of her well-being.   The patient was instructed to see the dentist every 6 months.   The patient reports drinking more than one alcoholic drink per day and sometimes engages in binge or excessive drinking. The patient was counseled and given information about possible harmful effects of excessive alcohol intake as well as where to get help for alcohol problems.       Tip Bravo is a 33 year old, presenting for the following:  Physical        5/7/2024     9:05 AM   Additional Questions   Roomed by Sommer APARICIO MA        Health Care Directive  Patient does not have a Health Care Directive or Living Will: Discussed advance care planning with patient; however, patient declined at this time.    HPI    *Would like cholesterol checked. Patient is not fasting this morning.   Has a significant family history of high cholesterol.     Drinking heavy the last month. 2-3 drinks daily for the last month. Dealing with a \"weird grief\" from her father whom she was estranged from with there dads death. More family was around.         5/7/2024   General Health   How would you rate your overall physical health? (!) FAIR-- not exercising, diet could improve, drinking more.    Feel stress (tense, anxious, or unable to sleep) To some extent   (!) STRESS CONCERN      5/7/2024   Nutrition   Three or more servings of calcium each day? Yes   Diet: Regular (no restrictions)   How many servings of fruit and vegetables per day? (!) 2-3   How many sweetened beverages each day? 0-1         5/7/2024   Exercise   Days per week of moderate/strenous exercise 1 day   (!) EXERCISE CONCERN      5/7/2024   Social Factors   Frequency of gathering with friends or relatives Once a week   Worry food won't last until get money to buy more No   Food not last or not have enough money for food? No   Do you have housing?  Yes "   Are you worried about losing your housing? No   Lack of transportation? No   Unable to get utilities (heat,electricity)? No         5/7/2024   Dental   Dentist two times every year? (!) NO         5/7/2024   TB Screening   Were you born outside of the [place]? No       Today's PHQ-9 Score:       5/7/2024     9:02 AM   PHQ-9 SCORE   PHQ-9 Total Score MyChart 3 (Minimal depression)   PHQ-9 Total Score 3         5/7/2024   Substance Use   Alcohol more than 3/day or more than 7/wk Yes   How often do you have a drink containing alcohol 4 or more times a week   How many alcohol drinks on typical day 1 or 2   How often do you have 5+ drinks at one occasion Never   Audit 2/3 Score 0   How often not able to stop drinking once started Never   How often failed to do what normally expected Never   How often needed first drink in am after a heavy drinking session Never   How often feeling of guilt or remorse after drinking Never   How often unable to remember what happened the night before Never   Have you or someone else been injured because of your drinking No   Has anyone been concerned or suggested you cut down on drinking No   TOTAL SCORE - AUDIT 4   Do you use any other substances recreationally? (!) ALCOHOL     Social History     Tobacco Use    Smoking status: Never    Smokeless tobacco: Never   Vaping Use    Vaping status: Never Used   Substance Use Topics    Alcohol use: Yes     Comment: occ.-quit with pregnancy    Drug use: Never           7/18/2023   LAST FHS-7 RESULTS   1st degree relative breast or ovarian cancer Yes   Any relative bilateral breast cancer No   Any male have breast cancer No   Any ONE woman have BOTH breast AND ovarian cancer No   Any woman with breast cancer before 50yrs Yes   2 or more relatives with breast AND/OR ovarian cancer Yes   2 or more relatives with breast AND/OR bowel cancer Yes        Mammogram Screening - Patient under 40 years of age: significant family history of breast cancer.  Mammogram completed 2023 annual screening recommended.         2024   STI Screening   New sexual partner(s) since last STI/HIV test? (!) DECLINE     History of abnormal Pap smear: NO - age 30-65 PAP every 5 years with negative HPV co-testing recommended        Latest Ref Rng & Units 2021     8:48 AM 2021     8:15 AM 2018     4:31 PM   PAP / HPV   PAP Negative for squamous intraepithelial lesion or malignancy.   Negative for squamous intraepithelial lesion or malignancy  Electronically signed by Audie Cooper CT (ASCP) on 2018 at 10:28 AM      PAP (Historical)  NIL      HPV 16 DNA NEG^Negative  Negative     HPV 18 DNA NEG^Negative  Negative     Other HR HPV NEG^Negative  Negative             2024   Contraception/Family Planning   Questions about contraception or family planning No        Reviewed and updated as needed this visit by Provider   Tobacco  Allergies  Meds  Problems  Med Hx  Surg Hx  Fam Hx            Past Medical History:   Diagnosis Date    Anxiety     Arrhythmia     Chickenpox     Depressive disorder     Intermittent. Never diagnosed or treated via psych or meds    History of urinary tract infection     Postpartum depression      Past Surgical History:   Procedure Laterality Date    WISDOM TOOTH EXTRACTION       Lab work is in process  Labs reviewed in EPIC  BP Readings from Last 3 Encounters:   24 100/62   23 98/62   22 100/51    Wt Readings from Last 3 Encounters:   24 68.5 kg (151 lb)   23 68.9 kg (151 lb 12.8 oz)   22 76.5 kg (168 lb 9.6 oz)                  Patient Active Problem List   Diagnosis    Other atopic dermatitis    Prenatal care, subsequent pregnancy in third trimester    Depression with anxiety     (spontaneous vaginal delivery)     Past Surgical History:   Procedure Laterality Date    WISDOM TOOTH EXTRACTION         Social History     Tobacco Use    Smoking status: Never    Smokeless tobacco:  "Never   Substance Use Topics    Alcohol use: Yes     Family History   Problem Relation Age of Onset    Breast Cancer Mother 42    Hyperlipidemia Mother     Diabetes Father         Type 1    Depression Sister     Breast Cancer Maternal Grandmother     Prostate Cancer Maternal Grandfather     Skin Cancer Maternal Grandfather     Hypertension Maternal Uncle     Other Cancer Maternal Uncle         Mothers Twin, Pancreatic Cancer    Depression Cousin     Breast Cancer Other         Mothers Sister, 2 Aunts, 3 cousins    Cerebrovascular Disease No family hx of          Current Outpatient Medications   Medication Sig Dispense Refill    sertraline (ZOLOFT) 100 MG tablet Take 1 tablet (100 mg) by mouth daily 90 tablet 3    Prenatal Vit-Fe Fumarate-FA (PRENATAL MULTIVITAMIN W/IRON) 27-0.8 MG tablet Take 1 tablet by mouth daily (Patient not taking: Reported on 5/7/2024)       No Known Allergies      Review of Systems  Constitutional, neuro, ENT, endocrine, pulmonary, cardiac, gastrointestinal, genitourinary, musculoskeletal, integument and psychiatric systems are negative, except as otherwise noted.     Objective    Exam  /62   Pulse 64   Temp 97.2  F (36.2  C) (Tympanic)   Resp 16   Ht 1.6 m (5' 3\")   Wt 68.5 kg (151 lb)   LMP 04/22/2024 (Exact Date)   SpO2 98%   BMI 26.75 kg/m     Estimated body mass index is 26.75 kg/m  as calculated from the following:    Height as of this encounter: 1.6 m (5' 3\").    Weight as of this encounter: 68.5 kg (151 lb).    Physical Exam  GENERAL: alert and no distress  EYES: Eyes grossly normal to inspection, PERRL and conjunctivae and sclerae normal  HENT: ear canals and TM's normal, nose and mouth without ulcers or lesions  NECK: no adenopathy, no asymmetry, masses, or scars  RESP: lungs clear to auscultation - no rales, rhonchi or wheezes  CV: regular rate and rhythm, normal S1 S2, no S3 or S4, no murmur, click or rub, no peripheral edema  ABDOMEN: soft, nontender, no " hepatosplenomegaly, no masses and bowel sounds normal  MS: no gross musculoskeletal defects noted, no edema  SKIN: no suspicious lesions or rashes  NEURO: Normal strength and tone, mentation intact and speech normal  PSYCH: mentation appears normal, affect normal/bright        Signed Electronically by: YULIET Witt CNP

## 2024-05-10 ENCOUNTER — MYC MEDICAL ADVICE (OUTPATIENT)
Dept: FAMILY MEDICINE | Facility: CLINIC | Age: 34
End: 2024-05-10
Payer: COMMERCIAL

## 2024-05-10 DIAGNOSIS — E78.5 HYPERLIPIDEMIA LDL GOAL <100: Primary | ICD-10-CM

## 2024-05-10 RX ORDER — ATORVASTATIN CALCIUM 20 MG/1
20 TABLET, FILM COATED ORAL DAILY
Qty: 90 TABLET | Refills: 3 | Status: SHIPPED | OUTPATIENT
Start: 2024-05-10 | End: 2024-09-18 | Stop reason: SINTOL

## 2024-09-04 ENCOUNTER — MYC MEDICAL ADVICE (OUTPATIENT)
Dept: FAMILY MEDICINE | Facility: CLINIC | Age: 34
End: 2024-09-04
Payer: COMMERCIAL

## 2024-09-04 DIAGNOSIS — Z80.3 FAMILY HISTORY OF MALIGNANT NEOPLASM OF BREAST: Primary | ICD-10-CM

## 2024-09-05 NOTE — TELEPHONE ENCOUNTER
Please see my chart message.     Patient states insurance will not cover unless it is diagnostic mammo    Pended    See my chart message    Shahida Barajas RN on 9/5/2024 at 11:36 AM

## 2024-09-16 ENCOUNTER — HOSPITAL ENCOUNTER (OUTPATIENT)
Dept: MAMMOGRAPHY | Facility: CLINIC | Age: 34
Discharge: HOME | End: 2024-09-16
Attending: NURSE PRACTITIONER | Admitting: NURSE PRACTITIONER
Payer: COMMERCIAL

## 2024-09-16 DIAGNOSIS — Z80.3 FAMILY HISTORY OF MALIGNANT NEOPLASM OF BREAST: ICD-10-CM

## 2024-09-16 PROCEDURE — 77063 BREAST TOMOSYNTHESIS BI: CPT

## 2024-09-18 ENCOUNTER — PATIENT OUTREACH (OUTPATIENT)
Dept: ONCOLOGY | Facility: CLINIC | Age: 34
End: 2024-09-18

## 2024-09-18 ENCOUNTER — OFFICE VISIT (OUTPATIENT)
Dept: FAMILY MEDICINE | Facility: CLINIC | Age: 34
End: 2024-09-18
Payer: COMMERCIAL

## 2024-09-18 VITALS
HEART RATE: 57 BPM | SYSTOLIC BLOOD PRESSURE: 110 MMHG | WEIGHT: 160.2 LBS | DIASTOLIC BLOOD PRESSURE: 68 MMHG | BODY MASS INDEX: 28.39 KG/M2 | RESPIRATION RATE: 16 BRPM | OXYGEN SATURATION: 99 % | HEIGHT: 63 IN

## 2024-09-18 DIAGNOSIS — E78.5 HYPERLIPIDEMIA LDL GOAL <100: Primary | ICD-10-CM

## 2024-09-18 DIAGNOSIS — Z80.3 FAMILY HISTORY OF MALIGNANT NEOPLASM OF BREAST: ICD-10-CM

## 2024-09-18 PROCEDURE — 90471 IMMUNIZATION ADMIN: CPT | Performed by: FAMILY MEDICINE

## 2024-09-18 PROCEDURE — 91320 SARSCV2 VAC 30MCG TRS-SUC IM: CPT | Performed by: FAMILY MEDICINE

## 2024-09-18 PROCEDURE — 86580 TB INTRADERMAL TEST: CPT | Performed by: FAMILY MEDICINE

## 2024-09-18 PROCEDURE — 99214 OFFICE O/P EST MOD 30 MIN: CPT | Mod: 25 | Performed by: FAMILY MEDICINE

## 2024-09-18 PROCEDURE — 90656 IIV3 VACC NO PRSV 0.5 ML IM: CPT | Performed by: FAMILY MEDICINE

## 2024-09-18 PROCEDURE — 90480 ADMN SARSCOV2 VAC 1/ONLY CMP: CPT | Performed by: FAMILY MEDICINE

## 2024-09-18 RX ORDER — ROSUVASTATIN CALCIUM 20 MG/1
20 TABLET, COATED ORAL DAILY
Qty: 90 TABLET | Refills: 3 | Status: SHIPPED | OUTPATIENT
Start: 2024-09-18

## 2024-09-18 NOTE — PROGRESS NOTES
Assessment & Plan     Hyperlipidemia LDL goal <100  Did not like how Lipitor made her feel-some issues swallowing it/feeling of tingling in throat. Will try rosuvastatin. If not tolerating or same side effects stop right away and seek medical care. Can consider lipid specialist.   - Lipid panel reflex to direct LDL Fasting  - **AST FUTURE 2mo  - **ALT FUTURE 2mo  - rosuvastatin (CRESTOR) 20 MG tablet  Dispense: 90 tablet; Refill: 3    Family history of malignant neoplasm of breast  Mother and maternal grandma with breast  cancer, recommend genetic screening and breast clinic  - Adult Genetics & Metabolism  Referral  - Breast Provider  Referral          Tip Bravo is a 33 year old, presenting for the following health issues:  Lipids        9/18/2024    11:16 AM   Additional Questions   Roomed by Lucila KWON MA   Accompanied by Self     History of Present Illness       Hyperlipidemia:  She presents for follow up of hyperlipidemia.   She is not taking medication to lower cholesterol. She is having myalgia or other side effects to statin medications.    Reason for visit:  Pre employment screening, tb test, update cholesterol med if able    She eats 2-3 servings of fruits and vegetables daily.She consumes 0 sweetened beverage(s) daily.She exercises with enough effort to increase her heart rate 9 or less minutes per day.  She exercises with enough effort to increase her heart rate 3 or less days per week. She is missing 7 dose(s) of medications per week.     Is starting a new job, needs to start 2-step TB test.    Was prescribed lipitor, was taking it but had side effects so she stopped.  Discuss trying a different medication        Review of Systems  Constitutional, HEENT, cardiovascular, pulmonary, gi and gu systems are negative, except as otherwise noted.      Objective    /68 (BP Location: Right arm, Patient Position: Chair, Cuff Size: Adult Regular)   Pulse 57   Resp 16   Ht 1.6 m  "(5' 3\")   Wt 72.7 kg (160 lb 3.2 oz)   SpO2 99%   Breastfeeding No   BMI 28.38 kg/m    Body mass index is 28.38 kg/m .  Physical Exam  Constitutional:       Appearance: Normal appearance.   HENT:      Head: Normocephalic.   Eyes:      Conjunctiva/sclera: Conjunctivae normal.   Cardiovascular:      Rate and Rhythm: Normal rate and regular rhythm.   Pulmonary:      Effort: Pulmonary effort is normal.      Breath sounds: Normal breath sounds.   Abdominal:      General: Bowel sounds are normal.   Skin:     General: Skin is warm and dry.   Neurological:      Mental Status: She is alert.            Signed Electronically by: BARBARA MOSER DO    "

## 2024-09-18 NOTE — PROGRESS NOTES
New Patient Oncology Nurse Navigator Note     Referring provider: Carmelita Ghosh DO      Referring Clinic/Organization: [facility]      Referred to (specialty:) Genetic Counseling     Requested provider (if applicable): NA     Date Referral Received: September 18, 2024     Evaluation for:  Family hx of breast cancer, breast cancer at a young age in mother,d doris sosa testing.    Z80.3 (ICD-10-CM) - Family history of malignant neoplasm of breast      Clinical History (per Nurse review of records provided):      Family history of malignant neoplasm of breast  Mother and maternal grandma with breast  cancer, recommend genetic screening and breast clinic    Payor: BLUE PLUS / Plan: [employer] / Product Type: HMO /     September 18, 2024    Referral received and reviewed.   Sent to NPS to process.     Stephanie HELMN, RN, OCN  Oncology Nurse Navigator   [facility]  Cancer Care Service Line   New Patient Hem/Onc Scheduling / Referrals: [phone] (fax: [phone] )

## 2024-09-18 NOTE — PROGRESS NOTES
New Patient Oncology Nurse Navigator Note     Referring provider:  Carmelita Ghosh DO     Referring Clinic/Organization: [facility]      Referred to (specialty:) Breast Provider Referral      Date Referral Received: September 18, 2024     Evaluation for:  Z80.3 (ICD-10-CM) - Family history of malignant neoplasm of breast     Clinical History (per Nurse review of records provided):      Shelly Salmeron is a 33 year old female with family history of breast cancer. She is being referred for breast provider consult.    Breast Cancer Mother (age 42 or 45 conflicting ages documented on chart review?)   Breast Cancer Maternal Grandmother   Prostate Cancer Maternal Grandfather   Skin Cancer Maternal Grandfather     Patient has also been referred multiple times for genetic counseling.  9/18/24 - triaged to New Patient Scheduling by colleague  5/7/24 - Max attempts to reach patient   6/27/23 - Scheduled to see Mauricio Waller on 10/19/23 - Mauricio Waller - cancelled by clinic (reason unknown)  9/18/24 - new genetic counseling referral - awaiting scheduling    Shelly had bilateral screening mammograms on 9/16/24 with ACR BI-RADS Category 1: Negative and routine yearly mammography beginning at age 40 or as discussed with provider recommended.    9/20 0812 - Telephoned and left voice message for patient requesting call back to writer directly to discuss breast provider referral. Generically explained my role and purpose for the call. Suggested she also call New Patient Scheduling at [phone] to move forward with genetic counseling scheduling.     9/23 1619 - Shelly returned call. We reviewed the genetic counseling referral and the consult with breast provider. She will proceed with both. (Typically breast provider would be scheduled after genetic testing, but we will proceed so aTmmy Roe PA-C can run breast cancer risk model and suggest screening if needed. Encouraged patient to keep  consult with genetic counseling when scheduled. Writer received referral, reviewed for appropriate plan, and call warm transferred to New Patient Scheduling for completion.    Records Location: See Bookmarked material     Records Needed: Breast imaging for past 5 years.

## 2024-09-20 ENCOUNTER — ALLIED HEALTH/NURSE VISIT (OUTPATIENT)
Dept: FAMILY MEDICINE | Facility: CLINIC | Age: 34
End: 2024-09-20
Payer: COMMERCIAL

## 2024-09-20 DIAGNOSIS — Z11.1 SCREENING EXAMINATION FOR PULMONARY TUBERCULOSIS: Primary | ICD-10-CM

## 2024-09-20 PROCEDURE — 99207 PR NO CHARGE NURSE ONLY: CPT

## 2024-09-20 NOTE — PROGRESS NOTES
Patient is here today for a Mantoux (TST) test results.    Did patient return to clinic 48-72 hours from Mantoux (TST) placement: Yes -     PPD Induration   Date Value Ref Range Status   05/31/2013 0 0 - 5 mm Final     PPD Redness   Date Value Ref Range Status   05/31/2013 0 mm Final       Induration Size? Induration <5mm - Enter results in Enter/Edit Activity. Route results to ordering provider.     Patient needs form signed? Yes. Follow clinic form process.     Patient reports having previously had the BCG Vaccine: No    Does patient need a two step? No    Shahida Barajas RN on 9/20/2024 at 2:35 PM

## 2024-09-20 NOTE — LETTER
[facility]  [address] [facility] [address]  [phone]          9/20/2024          To Whom it May Concern:     Shelly Salmeron, female, 1990 has had a mantoux on 9/18/24.      Mantoux result is NEGATIVE:  Lab Results   Component Value Date    PPDREDNESS 0 05/31/2013    PPDINDURATIO 0 05/31/2013         Please contact me for questions or concerns.        Sincerely,    [place] CL RN

## 2024-09-23 ENCOUNTER — TELEPHONE (OUTPATIENT)
Dept: FAMILY MEDICINE | Facility: CLINIC | Age: 34
End: 2024-09-23

## 2024-09-23 NOTE — TELEPHONE ENCOUNTER
General Call    Contacts       Contact Date/Time Type Contact Phone/Fax    09/23/2024 03:36 PM CDT Phone (Incoming) Shelly Salmeron (Self) [phone] (M)          Reason for Call:  Pt had a mantoux done and now her school or employer is requiring the two step mantoux?  Can you order another mantoux test and let patient know when this has been done.        Date of last appointment with provider: 9/18    Could we send this information to you in [employer] or would you prefer to receive a phone call?:   Patient would prefer a phone call   Okay to leave a detailed message?: Yes at Home number on file There is no home phone number on file. or Cell number on file:    Telephone Information:   Mobile [phone]     Courtney Green on 9/23/2024 at 3:39 PM

## 2024-09-24 NOTE — TELEPHONE ENCOUNTER
Sent [employer] message to schedule appt with cma.  Shruti Drummond RN on 9/24/2024 at 7:36 AM

## 2024-11-05 NOTE — TELEPHONE ENCOUNTER
RECORDS STATUS - BREAST    RECORDS REQUESTED FROM: [facility]   NOTES DETAILS STATUS   OFFICE NOTE from referring provider Epic 9/18/2024 - Dr. Carmelita Ghosh   MEDICATION LIST [facility]    LABS     PATHOLOGY REPORTS  (Tissue diagnosis, Stage, ER/ME percentage positive and intensity of staining, HER2 IHC, FISH, and all biopsies from breast and any distant metastasis)                 N/A N/A   IMAGING (NEED IMAGES & REPORT)     CT SCANS     MRI     MAMMO PACS 9/16/2024, 7/18/2023   ULTRASOUND     PET     BONE SCAN     BRAIN MRI     IMAGE DISC [employer] TRACKING    TRACKING # :

## 2024-12-09 NOTE — PROGRESS NOTES
NEW CONSULTATION   Dec 11, 2024     Shelly Salmeron is a 34 year old woman who presents with family history of breast cancer. She was referred by Dr. Ghosh.    HPI:    Family history of breast cancer in mother, maternal grandmother, 2 paternal aunts, and 3 maternal cousins.     She had a screening mammogram 24, BI-RADS 1.     Today she denies any breast mass, nipple inversion or nipple discharge. She does report 5-6 years of an eczema like rash along the medial breast, inframammary fold, and groin. She tried to keep the area clean and dry. She cleans with plain water, no soap or lotion. She has not seen Dermatology.     BREAST-SPECIFIC HISTORY:    Previous breast imaging: Yes  - 23 Smammo BI-RADS 1  - 24 Smammo BI-RADS 1    Prior breast biopsies/surgeries: No    Prior history of breast cancer or DCIS: No  Prior radiation history: No  Breast density: extremely dense    GYN HISTORY:  . Age at 1st pregnancy: 30. Breastfeeding history: Yes.   Age at menarche: 11  Menopausal: premenopausal.   Contraceptions: condoms   Menopausal hormone replacement therapy: No     RISK ASSESSMENT: < 3% 5 year risk by Duyen, < 5% 10 year risk by MIGUEL, and > 20% lifetime risk by MIGUEL  Duyen: 0.6% 5 year risk   MIGUEL/Kanwal: 33.7% lifetime risk, 3.1% 10 year risk     FAMILY HISTORY:  Breast ca: Yes  - mother, diagnosed at age 42, getting genetic testing.   - maternal grandmother, 79  - maternal great aunt,   - maternal great aunt  - maternal second cousin  - maternal second cousin  - maternal grandfathers aunt  Ovarian ca: No  Pancreatic ca: Yes  - maternal great grandfather  - maternal uncle  Prostate: Yes  - maternal grandfather  Gastric ca: No  Melanoma: No  Colon ca: Yes  - maternal grandfather  Other cancer: Yes  - maternal grandfather with skin cancer  - mother with follicular lymphoma   Other genetic, testing, syndromes, or clotting disorders: no     PAST MEDICAL HISTORY  Past Medical History:   Diagnosis Date  "   Anxiety     Arrhythmia     Chickenpox     Depressive disorder 2005    Intermittent. Never diagnosed or treated via psych or meds    History of urinary tract infection     Postpartum depression 2020     PAST SURGICAL HISTORY   Past Surgical History:   Procedure Laterality Date    WISDOM TOOTH EXTRACTION       MEDICATIONS  Current Outpatient Medications   Medication Sig Dispense Refill    Prenatal Vit-Fe Fumarate-FA (PRENATAL MULTIVITAMIN W/IRON) 27-0.8 MG tablet Take 1 tablet by mouth daily (Patient not taking: Reported on 5/7/2024)      rosuvastatin (CRESTOR) 20 MG tablet Take 1 tablet (20 mg) by mouth daily. 90 tablet 3    sertraline (ZOLOFT) 100 MG tablet Take 1 tablet (100 mg) by mouth daily 90 tablet 3      ALLERGIES   No Known Allergies     SOCIAL HISTORY:  Smokes: No  EtOH: Yes, daily  Exercise: active    Works for [employer]    ROS:   Change in vision No  Headaches: no  Respiratory: No shortness of breath, dyspnea on exertion, cough, or hemoptysis   Cardiovascular: negative   Gastrointestinal: negative Abdominal pain: no  Breast: breast rash  Musculoskeletal: negative Joint pain No Back pain: no  Psychiatric: negative  Hematologic/Lymphatic/Immunologic: negative  Endocrine: negative    EXAM  /77 (BP Location: Right arm, Patient Position: Sitting, Cuff Size: Adult Regular)   Pulse 88   Temp 98.5  F (36.9  C) (Oral)   Resp 16   Ht 1.61 m (5' 3.39\")   Wt 73.4 kg (161 lb 12.8 oz)   LMP 11/08/2024   SpO2 97%   BMI 28.31 kg/m     PHYSICAL EXAM  Respiratory: breathing non labored.   Breasts: Examination was done in both the upright and supine positions.  Breasts are symmetrical . No masses noted. No nipple changes. No nipple discharge.   Mild eczematous skin changes on the medial breast and inframammary fold consistent with dermatitis.   No clavicular, cervical, or axillary lymphadenopathy.     ASSESSMENT/PLAN:    Shelly Salmeron is a 34 year old woman with family history of breast cancer. She " meets NCCN guidelines for high risk screening and risk reduction.     1) Family history of breast cancer  Discussed she meets NCCN guidelines for high risk screening based on family history with lifetime risk for breast cancer of >20%. Screening recommendations based on NCCN guidelines. Be familiar with your breast and promptly report any changes to your health care provider. Clinical encounter every 6-12 months starting now (but not prior to age 21). Annual mammogram with milena starting 10 years prior to the youngest family member but not less than age 30 or age 40 ( whichever comes first). Annual breast MRI to begin 10 years prior to the youngest family member diagnosed but not less than 25 years old or age 40 ( whichever comes first).  Counseling was provided with available strategies including lifestyle modifications and risk reducing intervention.   - Breast MRI due: 3/2025  - Screening mammogram with clinic visit due: 9/17/25     2) Lifestyle Modifications were provided.   - Maintain your best healthy weight. Higher body fat and adult weight gain is associated with increased risk for breast cancer. This increase in risk has been attributed to increase in circulating endogenous estrogen levels from fat tissue.   - Any alcohol intake increases the risk for breast cancer. If you choose to drink alcohol limit alcohol consumption to less than 1 drink (1 ounce of liquor, 6 ounces of wine, or 8 ounces of beer) per day or less than 3 drinks per week.  - Be active daily and void being sedentary.   - Vitamin D may decrease the risk of developing breast cancer.     Tammy Roe PA-C    30 minutes spent on the date of the encounter doing chart review, review of test results, interpretation of tests, patient visit and documentation.

## 2024-12-11 ENCOUNTER — PRE VISIT (OUTPATIENT)
Dept: SURGERY | Facility: CLINIC | Age: 34
End: 2024-12-11
Payer: COMMERCIAL

## 2024-12-11 ENCOUNTER — ONCOLOGY VISIT (OUTPATIENT)
Dept: SURGERY | Facility: CLINIC | Age: 34
End: 2024-12-11
Attending: FAMILY MEDICINE
Payer: COMMERCIAL

## 2024-12-11 VITALS
HEIGHT: 63 IN | WEIGHT: 161.8 LBS | DIASTOLIC BLOOD PRESSURE: 77 MMHG | TEMPERATURE: 98.5 F | RESPIRATION RATE: 16 BRPM | OXYGEN SATURATION: 97 % | HEART RATE: 88 BPM | BODY MASS INDEX: 28.67 KG/M2 | SYSTOLIC BLOOD PRESSURE: 114 MMHG

## 2024-12-11 DIAGNOSIS — Z80.3 FAMILY HISTORY OF MALIGNANT NEOPLASM OF BREAST: ICD-10-CM

## 2024-12-11 DIAGNOSIS — Z91.89 AT HIGH RISK FOR BREAST CANCER: Primary | ICD-10-CM

## 2024-12-11 PROCEDURE — G0463 HOSPITAL OUTPT CLINIC VISIT: HCPCS | Performed by: PHYSICIAN ASSISTANT

## 2024-12-11 ASSESSMENT — PAIN SCALES - GENERAL: PAINLEVEL_OUTOF10: NO PAIN (0)

## 2024-12-11 NOTE — NURSING NOTE
"Oncology Rooming Note    December 11, 2024 8:01 AM   Shelly Salmeron is a 34 year old female who presents for:    Chief Complaint   Patient presents with    Oncology Clinic Visit     Family history of malignant neoplasm of breast     Initial Vitals: LMP 11/08/2024  Estimated body mass index is 28.38 kg/m  as calculated from the following:    Height as of 9/18/24: 1.6 m (5' 3\").    Weight as of 9/18/24: 72.7 kg (160 lb 3.2 oz). There is no height or weight on file to calculate BSA.  No Pain (0) Comment: Data Unavailable   Patient's last menstrual period was 11/08/2024.  Allergies reviewed: Yes  Medications reviewed: Yes    Medications: Medication refills not needed today.  Pharm[facility] into [facility]: [facility] [id number] [facility]    Frailty Screening:   Is the patient here for a new oncology consult visit in cancer care? 1. Yes. Over the past month, have you experienced difficulty or required a caregiver to assist with:   1. Balance, walking or general mobility (including any falls)? NO  2. Completion of self-care tasks such as bathing, dressing, toileting, grooming/hygiene?  NO  3. Concentration or memory that affects your daily life?  NO       Clinical concerns: none      Priti Sneed              "

## 2024-12-11 NOTE — LETTER
2024      Shelly Salmeron  [address] [facility] [id number]      Dear Colleague,    Thank you for referring your patient, Shelly Salmeron, to the [facility]. Please see a copy of my visit note below.    NEW CONSULTATION   Dec 11, 2024     Shelly Salmeron is a 34 year old woman who presents with family history of breast cancer. She was referred by Dr. Ghosh.    HPI:    Family history of breast cancer in mother, maternal grandmother, 2 paternal aunts, and 3 maternal cousins.     She had a screening mammogram 24, BI-RADS 1.     Today she denies any breast mass, nipple inversion or nipple discharge. She does report 5-6 years of an eczema like rash along the medial breast, inframammary fold, and groin. She tried to keep the area clean and dry. She cleans with plain water, no soap or lotion. She has not seen Dermatology.     BREAST-SPECIFIC HISTORY:    Previous breast imaging: Yes  - 23 Smammo BI-RADS 1  - 24 Smammo BI-RADS 1    Prior breast biopsies/surgeries: No    Prior history of breast cancer or DCIS: No  Prior radiation history: No  Breast density: extremely dense    GYN HISTORY:  . Age at 1st pregnancy: 30. Breastfeeding history: Yes.   Age at menarche: 11  Menopausal: premenopausal.   Contraceptions: condoms   Menopausal hormone replacement therapy: No     RISK ASSESSMENT: < 3% 5 year risk by Udyen, < 5% 10 year risk by MIGUEL, and > 20% lifetime risk by MIGUEL  Duyen: 0.6% 5 year risk   MIGUEL/Kanwal: 33.7% lifetime risk, 3.1% 10 year risk     FAMILY HISTORY:  Breast ca: Yes  - mother, diagnosed at age 42, getting genetic testing.   - maternal grandmother, 79  - maternal great aunt,   - maternal great aunt  - maternal second cousin  - maternal second cousin  - maternal grandfathers aunt  Ovarian ca: No  Pancreatic ca: Yes  - maternal great grandfather  - maternal uncle  Prostate: Yes  - maternal grandfather  Gastric ca: No  Melanoma: No  Colon ca: Yes  -  "maternal grandfather  Other cancer: Yes  - maternal grandfather with skin cancer  - mother with follicular lymphoma   Other genetic, testing, syndromes, or clotting disorders: no     PAST MEDICAL HISTORY  Past Medical History:   Diagnosis Date     Anxiety      Arrhythmia      Chickenpox      Depressive disorder 2005    Intermittent. Never diagnosed or treated via psych or meds     History of urinary tract infection      Postpartum depression 2020     PAST SURGICAL HISTORY   Past Surgical History:   Procedure Laterality Date     WISDOM TOOTH EXTRACTION       MEDICATIONS  Current Outpatient Medications   Medication Sig Dispense Refill     Prenatal Vit-Fe Fumarate-FA (PRENATAL MULTIVITAMIN W/IRON) 27-0.8 MG tablet Take 1 tablet by mouth daily (Patient not taking: Reported on 5/7/2024)       rosuvastatin (CRESTOR) 20 MG tablet Take 1 tablet (20 mg) by mouth daily. 90 tablet 3     sertraline (ZOLOFT) 100 MG tablet Take 1 tablet (100 mg) by mouth daily 90 tablet 3      ALLERGIES   No Known Allergies     SOCIAL HISTORY:  Smokes: No  EtOH: Yes, daily  Exercise: active    Works for [employer]    ROS:   Change in vision No  Headaches: no  Respiratory: No shortness of breath, dyspnea on exertion, cough, or hemoptysis   Cardiovascular: negative   Gastrointestinal: negative Abdominal pain: no  Breast: breast rash  Musculoskeletal: negative Joint pain No Back pain: no  Psychiatric: negative  Hematologic/Lymphatic/Immunologic: negative  Endocrine: negative    EXAM  /77 (BP Location: Right arm, Patient Position: Sitting, Cuff Size: Adult Regular)   Pulse 88   Temp 98.5  F (36.9  C) (Oral)   Resp 16   Ht 1.61 m (5' 3.39\")   Wt 73.4 kg (161 lb 12.8 oz)   LMP 11/08/2024   SpO2 97%   BMI 28.31 kg/m     PHYSICAL EXAM  Respiratory: breathing non labored.   Breasts: Examination was done in both the upright and supine positions.  Breasts are symmetrical . No masses noted. No nipple changes. No nipple discharge.   Mild " eczematous skin changes on the medial breast and inframammary fold consistent with dermatitis.   No clavicular, cervical, or axillary lymphadenopathy.     ASSESSMENT/PLAN:    Shelly Salmeron is a 34 year old woman with family history of breast cancer. She meets NCCN guidelines for high risk screening and risk reduction.     1) Family history of breast cancer  Discussed she meets NCCN guidelines for high risk screening based on family history with lifetime risk for breast cancer of >20%. Screening recommendations based on NCCN guidelines. Be familiar with your breast and promptly report any changes to your health care provider. Clinical encounter every 6-12 months starting now (but not prior to age 21). Annual mammogram with milena starting 10 years prior to the youngest family member but not less than age 30 or age 40 ( whichever comes first). Annual breast MRI to begin 10 years prior to the youngest family member diagnosed but not less than 25 years old or age 40 ( whichever comes first).  Counseling was provided with available strategies including lifestyle modifications and risk reducing intervention.   - Breast MRI due: 3/2025  - Screening mammogram with clinic visit due: 9/17/25     2) Lifestyle Modifications were provided.   - Maintain your best healthy weight. Higher body fat and adult weight gain is associated with increased risk for breast cancer. This increase in risk has been attributed to increase in circulating endogenous estrogen levels from fat tissue.   - Any alcohol intake increases the risk for breast cancer. If you choose to drink alcohol limit alcohol consumption to less than 1 drink (1 ounce of liquor, 6 ounces of wine, or 8 ounces of beer) per day or less than 3 drinks per week.  - Be active daily and void being sedentary.   - Vitamin D may decrease the risk of developing breast cancer.     Tammy Roe PA-C    30 minutes spent on the date of the encounter doing chart review, review of test  results, interpretation of tests, patient visit and documentation.        Again, thank you for allowing me to participate in the care of your patient.        Sincerely,        Tammy Roe PA-C

## 2024-12-11 NOTE — PATIENT INSTRUCTIONS
Shelly Salmeron is a 34 year old woman with family history of breast cancer. She meets NCCN guidelines for high risk screening and risk reduction.     1) Family history of breast cancer  Discussed she meets NCCN guidelines for high risk screening based on family history with lifetime risk for breast cancer of >20%. Screening recommendations based on NCCN guidelines. Be familiar with your breast and promptly report any changes to your health care provider. Clinical encounter every 6-12 months starting now (but not prior to age 21). Annual mammogram with milena starting 10 years prior to the youngest family member but not less than age 30 or age 40 ( whichever comes first). Annual breast MRI to begin 10 years prior to the youngest family member diagnosed but not less than 25 years old or age 40 ( whichever comes first).  Counseling was provided with available strategies including lifestyle modifications and risk reducing intervention.   - Breast MRI due: 3/2025  - Screening mammogram with clinic visit due: 9/17/25     2) Lifestyle Modifications were provided.   - Maintain your best healthy weight. Higher body fat and adult weight gain is associated with increased risk for breast cancer. This increase in risk has been attributed to increase in circulating endogenous estrogen levels from fat tissue.   - Any alcohol intake increases the risk for breast cancer. If you choose to drink alcohol limit alcohol consumption to less than 1 drink (1 ounce of liquor, 6 ounces of wine, or 8 ounces of beer) per day or less than 3 drinks per week.  - Be active daily and void being sedentary.   - Vitamin D may decrease the risk of developing breast cancer.

## 2025-06-04 ENCOUNTER — OFFICE VISIT (OUTPATIENT)
Dept: FAMILY MEDICINE | Facility: CLINIC | Age: 35
End: 2025-06-04

## 2025-06-04 VITALS
OXYGEN SATURATION: 98 % | HEART RATE: 62 BPM | RESPIRATION RATE: 16 BRPM | SYSTOLIC BLOOD PRESSURE: 102 MMHG | BODY MASS INDEX: 24.56 KG/M2 | HEIGHT: 63 IN | DIASTOLIC BLOOD PRESSURE: 62 MMHG | TEMPERATURE: 98 F | WEIGHT: 138.6 LBS

## 2025-06-04 DIAGNOSIS — E78.5 HYPERLIPIDEMIA LDL GOAL <100: ICD-10-CM

## 2025-06-04 DIAGNOSIS — K90.41 NON-CELIAC GLUTEN SENSITIVITY: ICD-10-CM

## 2025-06-04 DIAGNOSIS — Z00.00 ROUTINE GENERAL MEDICAL EXAMINATION AT A HEALTH CARE FACILITY: ICD-10-CM

## 2025-06-04 DIAGNOSIS — Z32.01 PREGNANCY TEST POSITIVE: Primary | ICD-10-CM

## 2025-06-04 PROCEDURE — 99395 PREV VISIT EST AGE 18-39: CPT | Performed by: FAMILY MEDICINE

## 2025-06-04 PROCEDURE — 99213 OFFICE O/P EST LOW 20 MIN: CPT | Mod: 25 | Performed by: FAMILY MEDICINE

## 2025-06-04 SDOH — HEALTH STABILITY: PHYSICAL HEALTH: ON AVERAGE, HOW MANY DAYS PER WEEK DO YOU ENGAGE IN MODERATE TO STRENUOUS EXERCISE (LIKE A BRISK WALK)?: 3 DAYS

## 2025-06-04 ASSESSMENT — SOCIAL DETERMINANTS OF HEALTH (SDOH): HOW OFTEN DO YOU GET TOGETHER WITH FRIENDS OR RELATIVES?: ONCE A WEEK

## 2025-06-04 ASSESSMENT — PAIN SCALES - GENERAL: PAINLEVEL_OUTOF10: NO PAIN (0)

## 2025-06-04 NOTE — PROGRESS NOTES
Preventive Care Visit  [facility]  Carmelita Ghosh DO, Family Medicine  Jun 4, 2025      Assessment & Plan     Routine general medical examination at a health care facility      Pregnancy test positive  Based on LMP about 8 weeks, occasional nausea, encouraged to start prenatals ASAP  - Ob/Gyn  Referral    Hyperlipidemia LDL goal <100  Stopped her statin, we will recheck, can revist after pregnancy  - Lipid panel reflex to direct LDL Fasting    Non-celiac gluten sensitivity  Limits gluten due to GI symptoms and  skin symptoms. We will check titers as she does continue to eat some to see about true celiac  - IgA  - Tissue transglutaminase franca IgA and IgG      Patient has been advised of split billing requirements and indicates understanding: Yes        Counseling  Appropriate preventive services were addressed with this patient via screening, questionnaire, or discussion as appropriate for fall prevention, nutrition, physical activity, Tobacco-use cessation, social engagement, weight loss and cognition.  Checklist reviewing preventive services available has been given to the patient.  Reviewed patient's diet, addressing concerns and/or questions.   She is at risk for lack of exercise and has been provided with information to increase physical activity for the benefit of her well-being.   The patient was instructed to see the dentist every 6 months.       Tip Bravo is a 34 year old, presenting for the following:  Physical        6/4/2025     1:41 PM   Additional Questions   Roomed by Lucila KWON MA   Accompanied by Self        HPI    Had a positive home pregnancy test on 5/12/25.  LMP 4/4/25.  Has not started prenatal vitamins yet.      Has not been taking rosuvastatin or sertraline.  Was taking inconsistently and then stopped.    Off sertraline for about 6 months as well    Intolerance to gluten-tries to avoid            Advance Care Planning    Advance care planning  document is on file but is outdated.  Patient encouraged to update or provider to update POLST.        6/4/2025   General Health   How would you rate your overall physical health? Good   Feel stress (tense, anxious, or unable to sleep) Only a little   (!) STRESS CONCERN      6/4/2025   Nutrition   Three or more servings of calcium each day? Yes   Diet: Gluten-free/reduced   How many servings of fruit and vegetables per day? (!) 2-3   How many sweetened beverages each day? 0-1         6/4/2025   Exercise   Days per week of moderate/strenous exercise 3 days         6/4/2025   Social Factors   Frequency of gathering with friends or relatives Once a week   Worry food won't last until get money to buy more No   Food not last or not have enough money for food? No   Do you have housing? (Housing is defined as stable permanent housing and does not include staying outside in a car, in a tent, in an abandoned building, in an overnight shelter, or couch-surfing.) Yes   Are you worried about losing your housing? No   Lack of transportation? No   Unable to get utilities (heat,electricity)? No         6/4/2025   Dental   Dentist two times every year? (!) NO         Today's PHQ-2 Score:       6/4/2025     1:33 PM   PHQ-2 ( 1999 [employer])   Q1: Little interest or pleasure in doing things 0   Q2: Feeling down, depressed or hopeless 0   PHQ-2 Score 0    Q1: Little interest or pleasure in doing things Not at all   Q2: Feeling down, depressed or hopeless Not at all   PHQ-2 Score 0       Patient-reported           6/4/2025   Substance Use   Alcohol more than 3/day or more than 7/wk No   Do you use any other substances recreationally? No     Social History     Tobacco Use    Smoking status: Never     Passive exposure: Never    Smokeless tobacco: Never   Vaping Use    Vaping status: Never Used   Substance Use Topics    Alcohol use: Yes    Drug use: Never           9/16/2024   LAST FHS-7 RESULTS   1st degree relative breast or ovarian cancer  "Yes   Any relative bilateral breast cancer No   Any male have breast cancer No   Any ONE woman have BOTH breast AND ovarian cancer No   Any woman with breast cancer before 50yrs Yes   2 or more relatives with breast AND/OR ovarian cancer No   2 or more relatives with breast AND/OR bowel cancer No     Mammogram Screening - Patient under 40 years of age: Routine Mammogram Screening not recommended.         6/4/2025   STI Screening   New sexual partner(s) since last STI/HIV test? No     History of abnormal Pap smear: No - age 30-64 HPV with reflex Pap every 5 years recommended        Latest Ref Rng & Units 5/17/2021     8:48 AM 5/17/2021     8:15 AM 12/17/2018     4:31 PM   PAP / HPV   PAP Negative for squamous intraepithelial lesion or malignancy.   Negative for squamous intraepithelial lesion or malignancy  Electronically signed by Audie Cooper CT (ASCP) on 12/19/2018 at 10:28 AM      PAP (Historical)  NIL      HPV 16 DNA NEG^Negative  Negative     HPV 18 DNA NEG^Negative  Negative     Other HR HPV NEG^Negative  Negative             6/4/2025   Contraception/Family Planning   Questions about contraception or family planning No        Reviewed and updated as needed this visit by Provider                    Past Medical History:   Diagnosis Date    Anxiety     Arrhythmia     Chickenpox     Depressive disorder 2005    Intermittent. Never diagnosed or treated via psych or meds    History of urinary tract infection     Postpartum depression 2020     Past Surgical History:   Procedure Laterality Date    WISDOM TOOTH EXTRACTION           Review of Systems  Constitutional, HEENT, cardiovascular, pulmonary, gi and gu systems are negative, except as otherwise noted.     Objective    Exam  /62 (BP Location: Right arm, Patient Position: Chair, Cuff Size: Adult Regular)   Pulse 62   Temp 98  F (36.7  C)   Resp 16   Ht 1.61 m (5' 3.39\")   Wt 62.9 kg (138 lb 9.6 oz)   LMP 04/04/2025   SpO2 98%   BMI 24.25 " "kg/m     Estimated body mass index is 24.25 kg/m  as calculated from the following:    Height as of this encounter: 1.61 m (5' 3.39\").    Weight as of this encounter: 62.9 kg (138 lb 9.6 oz).    Physical Exam  GENERAL: alert and no distress  EYES: Eyes grossly normal to inspection, PERRL and conjunctivae and sclerae normal  HENT: ear canals and TM's normal, nose and mouth without ulcers or lesions  NECK: no adenopathy, no asymmetry, masses, or scars  RESP: lungs clear to auscultation - no rales, rhonchi or wheezes  CV: regular rate and rhythm, normal S1 S2, no S3 or S4, no murmur, click or rub, no peripheral edema  ABDOMEN: soft, nontender, no hepatosplenomegaly, no masses and bowel sounds normal  MS: no gross musculoskeletal defects noted, no edema  SKIN: no suspicious lesions or rashes  NEURO: Normal strength and tone, mentation intact and speech normal  PSYCH: mentation appears normal, affect normal/bright        Signed Electronically by: Carmelita Ghosh,     "

## 2025-06-04 NOTE — PATIENT INSTRUCTIONS
Patient Education   Preventive Care Advice   This is general advice given by our system to help you stay healthy. However, your care team may have specific advice just for you. Please talk to your care team about your preventive care needs.  Nutrition  Eat 5 or more servings of fruits and vegetables each day.  Try wheat bread, brown rice and whole grain pasta (instead of white bread, rice, and pasta).  Get enough calcium and vitamin D. Check the label on foods and aim for 100% of the RDA (recommended daily allowance).  Lifestyle  Exercise at least 150 minutes each week  (30 minutes a day, 5 days a week).  Do muscle strengthening activities 2 days a week. These help control your weight and prevent disease.  No smoking.  Wear sunscreen to prevent skin cancer.  Have a dental exam and cleaning every 6 months.  Yearly exams  See your health care team every year to talk about:  Any changes in your health.  Any medicines your care team has prescribed.  Preventive care, family planning, and ways to prevent chronic diseases.  Shots (vaccines)   HPV shots (up to age 26), if you've never had them before.  Hepatitis B shots (up to age 59), if you've never had them before.  COVID-19 shot: Get this shot when it's due.  Flu shot: Get a flu shot every year.  Tetanus shot: Get a tetanus shot every 10 years.  Pneumococcal, hepatitis A, and RSV shots: Ask your care team if you need these based on your risk.  Shingles shot (for age 50 and up)  General health tests  Diabetes screening:  Starting at age 35, Get screened for diabetes at least every 3 years.  If you are younger than age 35, ask your care team if you should be screened for diabetes.  Cholesterol test: At age 39, start having a cholesterol test every 5 years, or more often if advised.  Bone density scan (DEXA): At age 50, ask your care team if you should have this scan for osteoporosis (brittle bones).  Hepatitis C: Get tested at least once in your life.  STIs (sexually  transmitted infections)  Before age 24: Ask your care team if you should be screened for STIs.  After age 24: Get screened for STIs if you're at risk. You are at risk for STIs (including HIV) if:  You are sexually active with more than one person.  You don't use condoms every time.  You or a partner was diagnosed with a sexually transmitted infection.  If you are at risk for HIV, ask about PrEP medicine to prevent HIV.  Get tested for HIV at least once in your life, whether you are at risk for HIV or not.  Cancer screening tests  Cervical cancer screening: If you have a cervix, begin getting regular cervical cancer screening tests starting at age 21.  Breast cancer scan (mammogram): If you've ever had breasts, begin having regular mammograms starting at age 40. This is a scan to check for breast cancer.  Colon cancer screening: It is important to start screening for colon cancer at age 45.  Have a colonoscopy test every 10 years (or more often if you're at risk) Or, ask your provider about stool tests like a FIT test every year or Cologuard test every 3 years.  To learn more about your testing options, visit:   .  For help making a decision, visit:   https://bit.ly/hf37268.  Prostate cancer screening test: If you have a prostate, ask your care team if a prostate cancer screening test (PSA) at age 55 is right for you.  Lung cancer screening: If you are a current or former smoker ages 50 to 80, ask your care team if ongoing lung cancer screenings are right for you.  For informational purposes only. Not to replace the advice of your health care provider. Copyright   2023 [employer]. All rights reserved. Clinically reviewed by the [facility]. [employer] 011910 - REV 01/24.

## 2025-06-30 LAB
ABO + RH BLD: NORMAL
BLD GP AB SCN SERPL QL: NEGATIVE
SPECIMEN EXP DATE BLD: NORMAL

## 2025-07-01 ENCOUNTER — PRENATAL OFFICE VISIT (OUTPATIENT)
Dept: OBGYN | Facility: CLINIC | Age: 35
End: 2025-07-01
Payer: MEDICAID

## 2025-07-01 VITALS
DIASTOLIC BLOOD PRESSURE: 68 MMHG | BODY MASS INDEX: 24.86 KG/M2 | TEMPERATURE: 98.5 F | SYSTOLIC BLOOD PRESSURE: 108 MMHG | RESPIRATION RATE: 18 BRPM | WEIGHT: 140.3 LBS | HEIGHT: 63 IN | HEART RATE: 78 BPM

## 2025-07-01 DIAGNOSIS — Z11.3 SCREENING FOR STD (SEXUALLY TRANSMITTED DISEASE): Primary | ICD-10-CM

## 2025-07-01 DIAGNOSIS — Z34.80 PRENATAL CARE, SUBSEQUENT PREGNANCY, ANTEPARTUM: ICD-10-CM

## 2025-07-01 DIAGNOSIS — Z34.81 PRENATAL CARE, SUBSEQUENT PREGNANCY IN FIRST TRIMESTER: ICD-10-CM

## 2025-07-01 DIAGNOSIS — O09.529 ANTEPARTUM MULTIGRAVIDA OF ADVANCED MATERNAL AGE: ICD-10-CM

## 2025-07-01 LAB
ALBUMIN UR-MCNC: NEGATIVE MG/DL
APPEARANCE UR: ABNORMAL
BACTERIA #/AREA URNS HPF: ABNORMAL /HPF
BILIRUB UR QL STRIP: NEGATIVE
C TRACH DNA SPEC QL PROBE+SIG AMP: NEGATIVE
COLOR UR AUTO: YELLOW
ERYTHROCYTE [DISTWIDTH] IN BLOOD BY AUTOMATED COUNT: 11.9 % (ref 10–15)
EST. AVERAGE GLUCOSE BLD GHB EST-MCNC: 108 MG/DL
GLUCOSE UR STRIP-MCNC: NEGATIVE MG/DL
HBA1C MFR BLD: 5.4 % (ref 0–5.6)
HBV CORE AB SERPL QL IA: NONREACTIVE
HBV SURFACE AB SERPL IA-ACNC: 645 M[IU]/ML
HBV SURFACE AB SERPL IA-ACNC: REACTIVE M[IU]/ML
HBV SURFACE AG SERPL QL IA: NONREACTIVE
HCT VFR BLD AUTO: 38.7 % (ref 35–47)
HCV AB SERPL QL IA: NONREACTIVE
HGB BLD-MCNC: 13.3 G/DL (ref 11.7–15.7)
HGB UR QL STRIP: NEGATIVE
HIV 1+2 AB+HIV1 P24 AG SERPL QL IA: NONREACTIVE
KETONES UR STRIP-MCNC: NEGATIVE MG/DL
LEUKOCYTE ESTERASE UR QL STRIP: ABNORMAL
MCH RBC QN AUTO: 30.9 PG (ref 26.5–33)
MCHC RBC AUTO-ENTMCNC: 34.4 G/DL (ref 31.5–36.5)
MCV RBC AUTO: 90 FL (ref 78–100)
MUCOUS THREADS #/AREA URNS LPF: PRESENT /LPF
N GONORRHOEA DNA SPEC QL NAA+PROBE: NEGATIVE
NITRATE UR QL: NEGATIVE
PH UR STRIP: 6 [PH] (ref 5–7)
PLATELET # BLD AUTO: 303 10E3/UL (ref 150–450)
RBC # BLD AUTO: 4.3 10E6/UL (ref 3.8–5.2)
RBC #/AREA URNS AUTO: ABNORMAL /HPF
RUBV IGG SERPL QL IA: 14.3 INDEX
RUBV IGG SERPL QL IA: POSITIVE
SP GR UR STRIP: 1.02 (ref 1–1.03)
SPECIMEN TYPE: NORMAL
SQUAMOUS #/AREA URNS AUTO: ABNORMAL /LPF
T PALLIDUM AB SER QL: NONREACTIVE
UROBILINOGEN UR STRIP-ACNC: 0.2 E.U./DL
WBC # BLD AUTO: 10.5 10E3/UL (ref 4–11)
WBC #/AREA URNS AUTO: ABNORMAL /HPF

## 2025-07-01 PROCEDURE — 36415 COLL VENOUS BLD VENIPUNCTURE: CPT | Performed by: OBSTETRICS & GYNECOLOGY

## 2025-07-01 PROCEDURE — 81001 URINALYSIS AUTO W/SCOPE: CPT | Performed by: OBSTETRICS & GYNECOLOGY

## 2025-07-01 PROCEDURE — 86803 HEPATITIS C AB TEST: CPT | Performed by: OBSTETRICS & GYNECOLOGY

## 2025-07-01 PROCEDURE — 99213 OFFICE O/P EST LOW 20 MIN: CPT | Performed by: OBSTETRICS & GYNECOLOGY

## 2025-07-01 PROCEDURE — 86850 RBC ANTIBODY SCREEN: CPT | Performed by: OBSTETRICS & GYNECOLOGY

## 2025-07-01 PROCEDURE — 86706 HEP B SURFACE ANTIBODY: CPT | Performed by: OBSTETRICS & GYNECOLOGY

## 2025-07-01 PROCEDURE — 87491 CHLMYD TRACH DNA AMP PROBE: CPT | Performed by: OBSTETRICS & GYNECOLOGY

## 2025-07-01 PROCEDURE — 87086 URINE CULTURE/COLONY COUNT: CPT | Performed by: OBSTETRICS & GYNECOLOGY

## 2025-07-01 PROCEDURE — 86780 TREPONEMA PALLIDUM: CPT | Performed by: OBSTETRICS & GYNECOLOGY

## 2025-07-01 PROCEDURE — 86704 HEP B CORE ANTIBODY TOTAL: CPT | Performed by: OBSTETRICS & GYNECOLOGY

## 2025-07-01 PROCEDURE — 87340 HEPATITIS B SURFACE AG IA: CPT | Performed by: OBSTETRICS & GYNECOLOGY

## 2025-07-01 PROCEDURE — 85027 COMPLETE CBC AUTOMATED: CPT | Performed by: OBSTETRICS & GYNECOLOGY

## 2025-07-01 PROCEDURE — 76801 OB US < 14 WKS SINGLE FETUS: CPT | Performed by: OBSTETRICS & GYNECOLOGY

## 2025-07-01 PROCEDURE — 86901 BLOOD TYPING SEROLOGIC RH(D): CPT | Performed by: OBSTETRICS & GYNECOLOGY

## 2025-07-01 PROCEDURE — 83036 HEMOGLOBIN GLYCOSYLATED A1C: CPT | Performed by: OBSTETRICS & GYNECOLOGY

## 2025-07-01 PROCEDURE — 87591 N.GONORRHOEAE DNA AMP PROB: CPT | Performed by: OBSTETRICS & GYNECOLOGY

## 2025-07-01 PROCEDURE — 86762 RUBELLA ANTIBODY: CPT | Performed by: OBSTETRICS & GYNECOLOGY

## 2025-07-01 PROCEDURE — 86900 BLOOD TYPING SEROLOGIC ABO: CPT | Performed by: OBSTETRICS & GYNECOLOGY

## 2025-07-01 PROCEDURE — 87389 HIV-1 AG W/HIV-1&-2 AB AG IA: CPT | Performed by: OBSTETRICS & GYNECOLOGY

## 2025-07-01 ASSESSMENT — EDINBURGH POSTNATAL DEPRESSION SCALE (EPDS)
THE THOUGHT OF HARMING MYSELF HAS OCCURRED TO ME: NEVER
I HAVE BEEN ANXIOUS OR WORRIED FOR NO GOOD REASON: NO, NOT AT ALL
I HAVE BEEN SO UNHAPPY THAT I HAVE HAD DIFFICULTY SLEEPING: NOT AT ALL
TOTAL SCORE: 1
I HAVE FELT SAD OR MISERABLE: NO, NOT AT ALL
I HAVE BLAMED MYSELF UNNECESSARILY WHEN THINGS WENT WRONG: NO, NEVER
I HAVE LOOKED FORWARD WITH ENJOYMENT TO THINGS: AS MUCH AS I EVER DID
THINGS HAVE BEEN GETTING ON TOP OF ME: NO, MOST OF THE TIME I HAVE COPED QUITE WELL
I HAVE BEEN SO UNHAPPY THAT I HAVE BEEN CRYING: NO, NEVER
I HAVE FELT SCARED OR PANICKY FOR NO GOOD REASON: NO, NOT AT ALL
I HAVE BEEN ABLE TO LAUGH AND SEE THE FUNNY SIDE OF THINGS: AS MUCH AS I ALWAYS COULD

## 2025-07-01 NOTE — PROGRESS NOTES
[facility]   OB/GYN Clinic    CC: New OB     Subjective:    Shelly is a 34 year old  at 12w4d by Patient's last menstrual period was 2025. who presents for her initial OB visit. This is a planned pregnancy and her and her partner Milind are excited. She reports feeling well. Denies any uterine cramping, abdominal pain or vaginal bleeding. Denies nausea and vomiting.   Prior to a +UPT, she reports regular monthly periods without contraception use.     OB History    Para Term  AB Living   3 2 2 0 0 2   SAB IAB Ectopic Multiple Live Births   0 0 0 0 2      # Outcome Date GA Lbr John/2nd Weight Sex Type Anes PTL Lv   3 Current            2 Term 22 40w0d 05:15 / 00:26 3.81 kg (8 lb 6.4 oz) M Vag-Spont EPI N JUSTICE      Name: MIRIAM CHINCHILLA      Apgar1: 7  Apgar5: 9   1 Term 20 39w3d 13:15 / 00:50 3.68 kg (8 lb 1.8 oz) M Vag-Spont Local, EPI N JUSTICE      Name: MIRIAM CHINCHILLA      Apgar1: 9  Apgar5: 9       Past Medical History:   Diagnosis Date    Anxiety     Arrhythmia     Chickenpox     Depressive disorder 2005    Intermittent. Never diagnosed or treated via psych or meds    History of urinary tract infection     Postpartum depression        Past Surgical History:   Procedure Laterality Date    WISDOM TOOTH EXTRACTION         Current Outpatient Medications   Medication Sig Dispense Refill    Prenatal Vit-Fe Fumarate-FA (PRENATAL MULTIVITAMIN W/IRON) 27-0.8 MG tablet Take 1 tablet by mouth daily         Family History   Problem Relation Age of Onset    Breast Cancer Mother 42    Hyperlipidemia Mother     Diabetes Father         Type 1    Depression Sister     Breast Cancer Maternal Grandmother     Prostate Cancer Maternal Grandfather     Skin Cancer Maternal Grandfather     No Known Problems Son     No Known Problems Son     Hypertension Maternal Uncle     Other Cancer Maternal Uncle         Mothers Twin, Pancreatic Cancer    Depression Cousin      "Breast Cancer Other         Mothers Sister, 2 Aunts, 3 cousins    Cerebrovascular Disease No family hx of        Social History     Tobacco Use    Smoking status: Never     Passive exposure: Never    Smokeless tobacco: Never   Substance Use Topics    Alcohol use: Not Currently       ROS: A 10 pt ROS was completed and found to be negative unless mentioned in the HPI.     Objective:  /68 (BP Location: Right arm, Patient Position: Chair, Cuff Size: Adult Regular)   Pulse 78   Temp 98.5  F (36.9  C) (Tympanic)   Resp 18   Ht 1.6 m (5' 3\")   Wt 63.6 kg (140 lb 4.8 oz)   LMP 2025   BMI 24.85 kg/m      Estimated body mass index is 24.85 kg/m  as calculated from the following:    Height as of this encounter: 1.6 m (5' 3\").    Weight as of this encounter: 63.6 kg (140 lb 4.8 oz).    Physical Exam:  Gen: Pleasant, talkative female in no apparent distress   Respiratory: Lungs clear, breathing comfortably on room air   Cardiac: Regular rate and rhythm with no murmurs, gallops or rubs. Warm and well-perfused.   GI: Abd soft and non-tender  : External genitalia is free of lesion. Urethra and bartholin glands normal.  Vaginal mucosa is moist and pink without unusual discharge. No appreciable adnexal enlargement. Uterus is consistent with a 13 week gestation.   MSK: Grossly normal movement of all four extremities  Psych: mood and affect bright   Lower extremity: edema not present     Transabdominal US: jones IUP measuring 13w3d, +cardiac activity at 148 bpm, normal adnexa      Assessment/Plan:   34 year old  at 12w4d by LMP of Patient's last menstrual period was 2025. C/w 13 wks US who presents for her initial OB visit.   1) Plan to draw new OB lab today (T&S, CBC, HIV, RPR, HepBsAg, Hep B antibody, rubella, GC/Chlam, UC)  2) Discussed routine prenatal care, group practice model at [facility], tertiary support and frequency of visits. Options for  testing for chromosomal and birth " defects were discussed with the patient including nuchal translucency/blood marker testing in the first trimester, progenity and quad screening. She would like NIPT with sex, ok over MyChart   3) Plan for dating/viability scan now - [facility] 1/9/2026  4) Concerns: none  5) PMH/OBHx problems: none, hx of PPD   6) Medication review: no changes, continue prenatal vitamin   7) Reviewed ectopic and miscarriage precautions (present to ED or call clinic with abdominal pain, vaginal bleeding or fever)   8) Weight gain: discussed weight gain expectations (BMI 18.5-25: 25-35lbs)   9) PAP smear: UTD  10) Delivery plan: continue to discuss, breastfeeding, pp contraception, pain management in labor - continue to discuss  11) Immunizations: flu/COVID/RSV seasonal, Tdap at 28wks     Return to clinic in 4 weeks.     Althea Jurado MD  OB/GYN  7/1/2025

## 2025-07-01 NOTE — PROGRESS NOTES
"Initial /68 (BP Location: Right arm, Patient Position: Chair, Cuff Size: Adult Regular)   Pulse 78   Temp 98.5  F (36.9  C) (Tympanic)   Resp 18   Ht 1.6 m (5' 3\")   Wt 63.6 kg (140 lb 4.8 oz)   LMP 04/04/2025   BMI 24.85 kg/m   Estimated body mass index is 24.85 kg/m  as calculated from the following:    Height as of this encounter: 1.6 m (5' 3\").    Weight as of this encounter: 63.6 kg (140 lb 4.8 oz). .  "

## 2025-07-02 LAB — BACTERIA UR CULT: NORMAL

## 2025-07-10 LAB — SCANNED LAB RESULT: NORMAL

## 2025-07-23 NOTE — PATIENT INSTRUCTIONS
Weeks 14 to 18 of Your Pregnancy: Care Instructions  Around this time, you may start to look pregnant. Your baby is now able to pass urine. And the first stool (meconium) is starting to collect in your baby's intestines. Hair is starting to grow on your baby's head.    You may notice some skin changes, such as itchy spots on your palms or acne on your face.   At your next doctor visit, you may have an ultrasound. So you might think about whether you want to know the sex of your baby. Also ask your doctor about flu and COVID-19 shots.   Tips for weeks 14 to 18 of pregnancy  How to reduce stress       Ask for help when you need it.  Try to avoid things that cause you stress.  Seek out things that relieve stress, such as breathing exercises or yoga.  How to get exercise       If you don't usually exercise, start slowly. Short walks may be a good choice.  Try to be active 30 minutes a day, at least 5 days a week.  Avoid activities where you're more likely to fall.  Use light weights to reduce stress on your joints.  How to stay at a healthy weight for you       Talk to your doctor or midwife about how much weight you should gain.  It's generally best to gain:  About 28 to 40 pounds if you're underweight.  About 25 to 35 pounds if you're at a healthy weight.  About 15 to 25 pounds if you're overweight.  About 11 to 20 pounds if you're very overweight (obese).  Follow-up care is a key part of your treatment and safety. Be sure to make and go to all appointments, and call your doctor if you are having problems. It's also a good idea to know your test results and keep a list of the medicines you take.  When should you call for help?  Call 911  anytime you think you may need emergency care. For example, call if:  You have severe vaginal bleeding. You have soaked through one or more pads in an hour, and the bleeding is not slowing down.  You have chest pain, are short of breath, or cough up blood.  You have sudden, severe  "pain in your belly that does not go away.  You passed out (lost consciousness).  Call your doctor or midwife now or seek immediate medical care if:  You have a fever.  You have vaginal bleeding.  You are dizzy or lightheaded, or you feel like you may faint.  You have signs of a blood clot in your leg (called a deep vein thrombosis), such as:  Pain in the calf, back of the knee, thigh, or groin.  Swelling in your leg or groin.  A color change on the leg or groin. The skin may be reddish or purplish.  You have symptoms of a urinary tract infection. These may include:  Pain or burning when you urinate.  A frequent need to urinate without being able to pass much urine.  Pain in your low back (below the rib cage and above the waist).  Blood in your urine.  You have belly pain.  You think you are having contractions.  Watch closely for changes in your health, and be sure to contact your doctor or midwife if:  You have vaginal discharge that smells bad.  You feel sad, anxious, or hopeless for more than a few days.  You have other concerns about your pregnancy.  Where can you learn more?  Go to https://www.[employer]/patiented  Enter I453 in the search box to learn more about \"Weeks 14 to 18 of Your Pregnancy: Care Instructions.\"  Current as of: April 30, 2024  Content Version: 14.5 2024-2025 [email or web address].   Care instructions adapted under license by your healthcare professional. If you have questions about a medical condition or this instruction, always ask your healthcare professional. [email or web address] disclaims any warranty or liability for your use of this information.    Second-Trimester Fetal Ultrasound: About This Test  What is it?     Fetal ultrasound is a test that uses sound waves to make pictures of your baby (fetus) and placenta inside the uterus. The test is the safest way to find out the age, size, and position of your baby. You also may be able to find out the sex of your baby. (But the " "test isn't done just to find out a baby's sex.)  No known risks to the mother or the baby are linked to fetal ultrasound. But you may feel anxious if the test reveals a problem with your pregnancy or baby.  Why is this test done?  In the second trimester, a fetal ultrasound is done to:  Estimate the number of weeks and days a fetus has developed since the beginning of the pregnancy. This is called the gestational age.  Look at the size and position of the fetus, the placenta, and the fluid that surrounds the fetus.  Find major birth defects, such as heart problems or problems with the brain and spinal cord (neural tube defects). But the test may not be able to find many minor defects and some major birth defects.  How do you prepare for the test?  In general, there's nothing you have to do before this test, unless your doctor tells you to.  How is the test done?  You may be able to leave your clothes on, or you will be given a gown to wear.  You will lie on your back on a padded examination table.  A gel will be spread on your belly. It will be removed after the test.  A small, handheld device called a transducer will be pressed against the gel on your skin and moved across your belly several times.  You may watch the monitor to see the picture of your baby during the test.  What happens after the test?  You will probably be able to go home right away.  You most likely will be able to go back to your usual activities right away.  Follow-up care is a key part of your treatment and safety. Be sure to make and go to all appointments, and call your doctor if you are having problems. It's also a good idea to keep a list of the medicines you take. Ask your doctor when you can expect to have your test results.  Where can you learn more?  Go to https://www.healthwise.net/patiented  Enter Y671 in the search box to learn more about \"Second-Trimester Fetal Ultrasound: About This Test.\"  Current as of: April 30, 2024  Content " Version: 14.5    5975-3223 [employer].   Care instructions adapted under license by your healthcare professional. If you have questions about a medical condition or this instruction, always ask your healthcare professional. [employer] disclaims any warranty or liability for your use of this information.    During Pregnancy: Exercises  Introduction  Here are some examples of exercises to do during your pregnancy. Start each exercise slowly. Ease off the exercise if you start to have pain.  Talk to your doctor about when you can start these exercises and which ones will work best for you.  How to do the exercises  Neck rotation    Sit up straight in a firm chair, or stand up straight. If you're standing, keep your feet about hip-width apart.  Keeping your chin level, turn your head to the right and hold for 15 to 30 seconds.  Turn your head to the left and hold for 15 to 30 seconds.  Repeat 2 to 4 times.  Neck stretch to the front    Sit up straight in a firm chair, or stand up straight. Look straight ahead. If you're standing, keep your feet about hip-width apart.  Slowly bend your head forward without moving your shoulders.  Hold for 15 to 30 seconds, then return to your starting position.  Repeat 2 to 4 times.  Back press    Stand with your back 10 to 12 inches away from a wall.  Lean into the wall until your back is against it. Press your lower back against the wall by pulling in your stomach muscles.  Slowly slide down until your knees are slightly bent, pressing your lower back against the wall.  Hold for at least 6 seconds, then slide back up the wall.  Repeat 8 to 12 times.  Over time, work up to holding this position for as much as 1 minute.  Trunk twist    Sit on the floor with your legs crossed. If that's not comfortable, you can sit on a folded blanket so your bottom is a few inches off the floor. Or you can sit on a chair with your knees hip-width apart and your feet flat on the  floor.  Reach your left hand toward your right knee. You can place your right hand at your side for support.  Slowly twist your body (trunk) to your right.  Relax and return to your starting position.  Repeat 2 to 4 times.  Switch your hands and twist to your left.  Repeat 2 to 4 times.  Pelvic rocking on hands and knees    Start on your hands and knees. Place your wrists directly below your shoulders and your knees below your hips.  Breathe in slowly. Tuck your head downward and round your back up, making a curve with your back in the shape of the letter C. Hold this position for about 6 seconds.  Breathe out slowly and bring your head back up. Relax, keeping your back straight. (Don't allow it to curve toward the floor.) Hold for about 6 seconds.  Repeat 8 to 12 times, gently rocking your pelvis.  Pelvic tilt    Lie on your back with your knees bent and your feet flat on the floor.  Tighten your belly muscles by pulling your belly button in toward your spine. Press your lower back to the floor. You should feel your hips and pelvis rock back.  Hold for 6 seconds while breathing smoothly, and then relax.  Repeat 8 to 12 times.  Do this exercise only during the first 4 months of pregnancy. After this point, lying on your back is not recommended, because it can cause blood flow problems for you and your baby.  Backward stretch    Start on your hands and knees with your knees 8 to 10 inches apart, hands directly below your shoulders, and arms and back straight.  Keeping your arms straight, slowly lower your buttocks toward your heels and tuck your head toward your knees. Hold for 15 to 30 seconds.  Slowly return to the starting position.  Repeat 2 to 4 times.  Forward bend    Sit comfortably in a chair, with your arms relaxed.  Slowly bend forward, allowing your arms to hang down. Lean only as far as you can without feeling discomfort or pressure on your belly.  Hold for 15 to 30 seconds and then slowly sit up  straight.  Repeat 2 to 4 times or to your comfort level.  Donkey kick    Start on your hands and knees. Place your hands directly below your shoulders, and keep your arms straight.  Tighten your belly muscles by pulling your belly button in toward your spine. Keep breathing normally, and don't hold your breath.  Lift one knee and bring it toward your elbow.  Slowly extend that leg behind you without completely straightening it. Be careful not to let your hip drop down. Avoid arching your back.  Hold your leg behind you for about 6 seconds.  Return to your starting position.  Repeat 8 to 12 times for each leg.  Tailor sitting    Sit on the floor.  Bring your feet close to your body while crossing your ankles.  Keep your back straight. Relax your legs and let your knees drop toward the floor.  Hold this position for as long as you are comfortable.  Toe reach    Sit on the floor with your back straight, legs about 12 inches apart, and feet relaxed outward.  Stretch your hands forward toward your right foot, then sit up.  Stretch your hands straight forward, then sit up.  Stretch your hands forward toward your left foot, then sit up.  Hold each stretch for 15 to 30 seconds.  Repeat 2 to 4 times.  Follow-up care is a key part of your treatment and safety. Be sure to make and go to all appointments, and call your doctor if you are having problems. It's also a good idea to know your test results and keep a list of the medicines you take.  Current as of: April 30, 2024  Content Version: 14.5    9361-2376 [employer].   Care instructions adapted under license by your healthcare professional. If you have questions about a medical condition or this instruction, always ask your healthcare professional. [employer] disclaims any warranty or liability for your use of this information.

## 2025-07-24 PROBLEM — O09.529 ANTEPARTUM MULTIGRAVIDA OF ADVANCED MATERNAL AGE: Status: ACTIVE | Noted: 2025-07-24

## 2025-07-24 NOTE — PROGRESS NOTES
Return OB-- 16w4d    S: Feels well.  +FM.  No cramping/bleeding.    O: See flowsheet        A/P: 34 year old  @ 16w4d  -- NOB labs: A pos, otherwise normal  -- Genetics: NIPT low risk (girl!). Declines AFP  -- Plan L2 US for AMA  -- Immunizations: flu/COVID/RSV seasonal, Tdap at 28wks     PMH/OBHx problems:   1) AMA: NIPT low risk, plan L2 US    2) Hx PPD    RTC 4 weeks.    Kristie Vila MD

## 2025-07-29 ENCOUNTER — TRANSCRIBE ORDERS (OUTPATIENT)
Dept: MATERNAL FETAL MEDICINE | Facility: HOSPITAL | Age: 35
End: 2025-07-29

## 2025-07-29 ENCOUNTER — PRENATAL OFFICE VISIT (OUTPATIENT)
Dept: OBGYN | Facility: CLINIC | Age: 35
End: 2025-07-29
Payer: MEDICAID

## 2025-07-29 VITALS
SYSTOLIC BLOOD PRESSURE: 113 MMHG | BODY MASS INDEX: 25.69 KG/M2 | HEIGHT: 63 IN | HEART RATE: 88 BPM | WEIGHT: 145 LBS | DIASTOLIC BLOOD PRESSURE: 70 MMHG

## 2025-07-29 DIAGNOSIS — O09.529 ANTEPARTUM MULTIGRAVIDA OF ADVANCED MATERNAL AGE: ICD-10-CM

## 2025-07-29 DIAGNOSIS — O26.90 PREGNANCY RELATED CONDITION, ANTEPARTUM: Primary | ICD-10-CM

## 2025-07-29 DIAGNOSIS — Z34.83 PRENATAL CARE, SUBSEQUENT PREGNANCY IN THIRD TRIMESTER: Primary | ICD-10-CM

## 2025-07-29 PROCEDURE — 3074F SYST BP LT 130 MM HG: CPT | Performed by: OBSTETRICS & GYNECOLOGY

## 2025-07-29 PROCEDURE — 0502F SUBSEQUENT PRENATAL CARE: CPT | Performed by: OBSTETRICS & GYNECOLOGY

## 2025-07-29 PROCEDURE — 3078F DIAST BP <80 MM HG: CPT | Performed by: OBSTETRICS & GYNECOLOGY

## 2025-07-29 PROCEDURE — 99207 PR PRENATAL VISIT: CPT | Performed by: OBSTETRICS & GYNECOLOGY

## 2025-08-14 ENCOUNTER — VIRTUAL VISIT (OUTPATIENT)
Dept: ONCOLOGY | Facility: CLINIC | Age: 35
End: 2025-08-14
Attending: FAMILY MEDICINE
Payer: COMMERCIAL

## 2025-08-14 DIAGNOSIS — Z80.8 FAMILY HISTORY OF SKIN CANCER: ICD-10-CM

## 2025-08-14 DIAGNOSIS — Z80.7 FAMILY HISTORY OF LYMPHOMA: ICD-10-CM

## 2025-08-14 DIAGNOSIS — Z80.3 FAMILY HISTORY OF MALIGNANT NEOPLASM OF BREAST: Primary | ICD-10-CM

## 2025-08-14 DIAGNOSIS — Z80.42 FAMILY HISTORY OF PROSTATE CANCER: ICD-10-CM

## 2025-08-14 DIAGNOSIS — Z80.0 FAMILY HISTORY OF COLON CANCER: ICD-10-CM

## 2025-08-14 DIAGNOSIS — Z80.0 FAMILY HISTORY OF PANCREATIC CANCER: ICD-10-CM

## 2025-08-14 PROCEDURE — 96041 GENETIC COUNSELING SVC EA 30: CPT | Mod: TEL,95

## 2025-08-18 ENCOUNTER — PRE VISIT (OUTPATIENT)
Dept: MATERNAL FETAL MEDICINE | Facility: HOSPITAL | Age: 35
End: 2025-08-18
Payer: COMMERCIAL

## 2025-08-22 ENCOUNTER — ANCILLARY PROCEDURE (OUTPATIENT)
Dept: ULTRASOUND IMAGING | Facility: HOSPITAL | Age: 35
End: 2025-08-22
Attending: OBSTETRICS & GYNECOLOGY
Payer: COMMERCIAL

## 2025-08-22 DIAGNOSIS — O26.90 PREGNANCY RELATED CONDITION, ANTEPARTUM: ICD-10-CM

## 2025-08-22 PROCEDURE — 76811 OB US DETAILED SNGL FETUS: CPT | Mod: 26 | Performed by: OBSTETRICS & GYNECOLOGY

## 2025-08-22 PROCEDURE — 76811 OB US DETAILED SNGL FETUS: CPT

## 2025-08-26 ENCOUNTER — PRENATAL OFFICE VISIT (OUTPATIENT)
Dept: OBGYN | Facility: CLINIC | Age: 35
End: 2025-08-26
Payer: COMMERCIAL

## 2025-08-26 VITALS
BODY MASS INDEX: 26.25 KG/M2 | SYSTOLIC BLOOD PRESSURE: 107 MMHG | WEIGHT: 150 LBS | HEART RATE: 86 BPM | DIASTOLIC BLOOD PRESSURE: 70 MMHG

## 2025-08-26 DIAGNOSIS — O09.529 ANTEPARTUM MULTIGRAVIDA OF ADVANCED MATERNAL AGE: ICD-10-CM

## 2025-08-26 DIAGNOSIS — Z34.82 PRENATAL CARE, SUBSEQUENT PREGNANCY IN SECOND TRIMESTER: Primary | ICD-10-CM

## 2025-08-26 PROCEDURE — 99207 PR PRENATAL VISIT: CPT | Performed by: OBSTETRICS & GYNECOLOGY
